# Patient Record
Sex: FEMALE | Race: BLACK OR AFRICAN AMERICAN | Employment: FULL TIME | URBAN - METROPOLITAN AREA
[De-identification: names, ages, dates, MRNs, and addresses within clinical notes are randomized per-mention and may not be internally consistent; named-entity substitution may affect disease eponyms.]

---

## 2018-05-30 ENCOUNTER — HOSPITAL ENCOUNTER (OUTPATIENT)
Dept: GENERAL RADIOLOGY | Age: 43
Discharge: HOME | End: 2018-05-30

## 2018-05-30 ENCOUNTER — HOSPITAL ENCOUNTER (OUTPATIENT)
Dept: GENERAL RADIOLOGY | Age: 43
Discharge: HOME | End: 2018-05-30
Attending: NURSE PRACTITIONER | Admitting: NURSE PRACTITIONER

## 2018-05-30 DIAGNOSIS — M25.552 PAIN IN LEFT HIP: ICD-10-CM

## 2018-05-30 DIAGNOSIS — M25.562 CHRONIC PAIN OF LEFT KNEE: ICD-10-CM

## 2018-05-30 DIAGNOSIS — G89.29 CHRONIC PAIN OF LEFT KNEE: ICD-10-CM

## 2018-05-30 DIAGNOSIS — M25.511 CHRONIC PAIN IN RIGHT SHOULDER: ICD-10-CM

## 2018-05-30 DIAGNOSIS — G89.29 CHRONIC PAIN IN RIGHT SHOULDER: ICD-10-CM

## 2018-10-03 ENCOUNTER — HOSPITAL ENCOUNTER (OUTPATIENT)
Age: 43
Discharge: HOME | End: 2018-10-03
Payer: COMMERCIAL

## 2018-10-03 LAB
ALBUMIN SERPL-MCNC: 4.4 GM/DL (ref 3.4–5)
ALBUMIN SERPL-MCNC: 4.4 GM/DL (ref 3.4–5)
ALP BLD-CCNC: 124 IU/L (ref 40–128)
ALP BLD-CCNC: 124 IU/L (ref 40–129)
ALT SERPL-CCNC: 10 U/L (ref 10–40)
ALT SERPL-CCNC: 10 U/L (ref 10–40)
ANION GAP SERPL CALCULATED.3IONS-SCNC: 13 MMOL/L (ref 4–16)
AST SERPL-CCNC: 11 IU/L (ref 15–37)
AST SERPL-CCNC: 11 IU/L (ref 15–37)
BASOPHILS ABSOLUTE: 0 K/CU MM
BASOPHILS RELATIVE PERCENT: 0.3 % (ref 0–1)
BILIRUB SERPL-MCNC: 0.3 MG/DL (ref 0–1)
BILIRUB SERPL-MCNC: 0.3 MG/DL (ref 0–1)
BILIRUBIN DIRECT: 0.2 MG/DL (ref 0–0.3)
BILIRUBIN, INDIRECT: 0.1 MG/DL (ref 0–0.7)
BUN BLDV-MCNC: 8 MG/DL (ref 6–23)
CALCIUM SERPL-MCNC: 9.6 MG/DL (ref 8.3–10.6)
CHLORIDE BLD-SCNC: 98 MMOL/L (ref 99–110)
CO2: 29 MMOL/L (ref 21–32)
CREAT SERPL-MCNC: 0.7 MG/DL (ref 0.6–1.1)
DIFFERENTIAL TYPE: ABNORMAL
EOSINOPHILS ABSOLUTE: 0.1 K/CU MM
EOSINOPHILS RELATIVE PERCENT: 1.8 % (ref 0–3)
ERYTHROCYTE SEDIMENTATION RATE: 31 MM/HR (ref 0–20)
FOLATE: 11 NG/ML (ref 3.1–17.5)
GFR AFRICAN AMERICAN: >60 ML/MIN/1.73M2
GFR NON-AFRICAN AMERICAN: >60 ML/MIN/1.73M2
GLUCOSE FASTING: 90 MG/DL (ref 70–99)
HCT VFR BLD CALC: 42.5 % (ref 37–47)
HEMOGLOBIN: 13.9 GM/DL (ref 12.5–16)
IMMATURE NEUTROPHIL %: 0.3 % (ref 0–0.43)
LYMPHOCYTES ABSOLUTE: 2.8 K/CU MM
LYMPHOCYTES RELATIVE PERCENT: 36.8 % (ref 24–44)
MCH RBC QN AUTO: 31.3 PG (ref 27–31)
MCHC RBC AUTO-ENTMCNC: 32.7 % (ref 32–36)
MCV RBC AUTO: 95.7 FL (ref 78–100)
MONOCYTES ABSOLUTE: 0.6 K/CU MM
MONOCYTES RELATIVE PERCENT: 7.6 % (ref 0–4)
NUCLEATED RBC %: 0 %
PDW BLD-RTO: 12.2 % (ref 11.7–14.9)
PLATELET # BLD: 273 K/CU MM (ref 140–440)
PMV BLD AUTO: 10.8 FL (ref 7.5–11.1)
POTASSIUM SERPL-SCNC: 3.6 MMOL/L (ref 3.5–5.1)
RBC # BLD: 4.44 M/CU MM (ref 4.2–5.4)
SEGMENTED NEUTROPHILS ABSOLUTE COUNT: 4.1 K/CU MM
SEGMENTED NEUTROPHILS RELATIVE PERCENT: 53.2 % (ref 36–66)
SODIUM BLD-SCNC: 140 MMOL/L (ref 135–145)
TOTAL CK: 119 IU/L (ref 26–140)
TOTAL IMMATURE NEUTOROPHIL: 0.02 K/CU MM
TOTAL NUCLEATED RBC: 0 K/CU MM
TOTAL PROTEIN: 6.9 GM/DL (ref 6.4–8.2)
TOTAL PROTEIN: 6.9 GM/DL (ref 6.4–8.2)
TSH HIGH SENSITIVITY: 0.7 UIU/ML (ref 0.27–4.2)
VITAMIN B-12: 645.1 PG/ML (ref 211–911)
WBC # BLD: 7.7 K/CU MM (ref 4–10.5)

## 2018-10-03 PROCEDURE — 82248 BILIRUBIN DIRECT: CPT

## 2018-10-03 PROCEDURE — 82746 ASSAY OF FOLIC ACID SERUM: CPT

## 2018-10-03 PROCEDURE — 36415 COLL VENOUS BLD VENIPUNCTURE: CPT

## 2018-10-03 PROCEDURE — 84165 PROTEIN E-PHORESIS SERUM: CPT

## 2018-10-03 PROCEDURE — 86038 ANTINUCLEAR ANTIBODIES: CPT

## 2018-10-03 PROCEDURE — 84479 ASSAY OF THYROID (T3 OR T4): CPT

## 2018-10-03 PROCEDURE — 85652 RBC SED RATE AUTOMATED: CPT

## 2018-10-03 PROCEDURE — 84436 ASSAY OF TOTAL THYROXINE: CPT

## 2018-10-03 PROCEDURE — 82607 VITAMIN B-12: CPT

## 2018-10-03 PROCEDURE — 80050 GENERAL HEALTH PANEL: CPT

## 2018-10-03 PROCEDURE — 82550 ASSAY OF CK (CPK): CPT

## 2018-10-04 LAB
ALBUMIN ELP: 3.6 GM/DL (ref 3.2–5.6)
ALPHA-1-GLOBULIN: 0.3 GM/DL (ref 0.1–0.4)
ALPHA-2-GLOBULIN: 0.9 GM/DL (ref 0.4–1.2)
BETA GLOBULIN: 1 GM/DL (ref 0.5–1.3)
GAMMA GLOBULIN: 1.1 GM/DL (ref 0.5–1.6)
TOTAL PROTEIN: 6.9 GM/DL (ref 6.4–8.2)

## 2018-10-05 LAB
T3 UPTAKE PERCENT: 33
T4 TOTAL: 6.99 UG/DL

## 2018-10-06 LAB — ANTI-NUCLEAR ANTIBODY (ANA): NORMAL

## 2018-10-08 ENCOUNTER — HOSPITAL ENCOUNTER (OUTPATIENT)
Dept: WOMENS IMAGING | Age: 43
Discharge: HOME | End: 2018-10-08
Payer: COMMERCIAL

## 2018-10-08 DIAGNOSIS — Z12.31 VISIT FOR SCREENING MAMMOGRAM: ICD-10-CM

## 2018-10-08 PROCEDURE — 77063 BREAST TOMOSYNTHESIS BI: CPT

## 2018-10-20 ENCOUNTER — APPOINTMENT (OUTPATIENT)
Dept: GENERAL RADIOLOGY | Age: 43
End: 2018-10-20
Payer: COMMERCIAL

## 2018-10-20 ENCOUNTER — APPOINTMENT (OUTPATIENT)
Dept: CT IMAGING | Age: 43
End: 2018-10-20
Payer: COMMERCIAL

## 2018-10-20 ENCOUNTER — HOSPITAL ENCOUNTER (EMERGENCY)
Age: 43
Discharge: HOME | End: 2018-10-20
Attending: EMERGENCY MEDICINE
Payer: COMMERCIAL

## 2018-10-20 VITALS
SYSTOLIC BLOOD PRESSURE: 127 MMHG | OXYGEN SATURATION: 97 % | WEIGHT: 212 LBS | HEIGHT: 68 IN | RESPIRATION RATE: 17 BRPM | BODY MASS INDEX: 32.13 KG/M2 | TEMPERATURE: 98.2 F | HEART RATE: 93 BPM | DIASTOLIC BLOOD PRESSURE: 80 MMHG

## 2018-10-20 DIAGNOSIS — R51.9 FACIAL PAIN: ICD-10-CM
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
## 2018-10-20 DIAGNOSIS — M25.561 ACUTE PAIN OF RIGHT KNEE: ICD-10-CM

## 2018-10-20 DIAGNOSIS — S40.011A CONTUSION OF RIGHT SHOULDER, INITIAL ENCOUNTER: ICD-10-CM

## 2018-10-20 PROCEDURE — 72125 CT NECK SPINE W/O DYE: CPT

## 2018-10-20 PROCEDURE — 73562 X-RAY EXAM OF KNEE 3: CPT

## 2018-10-20 PROCEDURE — 70486 CT MAXILLOFACIAL W/O DYE: CPT

## 2018-10-20 PROCEDURE — 70450 CT HEAD/BRAIN W/O DYE: CPT

## 2018-10-20 PROCEDURE — 73030 X-RAY EXAM OF SHOULDER: CPT

## 2018-10-20 PROCEDURE — 99284 EMERGENCY DEPT VISIT MOD MDM: CPT

## 2018-10-20 PROCEDURE — 72170 X-RAY EXAM OF PELVIS: CPT

## 2018-10-20 PROCEDURE — 71045 X-RAY EXAM CHEST 1 VIEW: CPT

## 2018-10-20 PROCEDURE — 6360000002 HC RX W HCPCS: Performed by: EMERGENCY MEDICINE

## 2018-10-20 RX ORDER — KETOROLAC TROMETHAMINE 30 MG/ML
30 INJECTION, SOLUTION INTRAMUSCULAR; INTRAVENOUS ONCE
Status: COMPLETED | OUTPATIENT
Start: 2018-10-20 | End: 2018-10-20

## 2018-10-20 RX ADMIN — KETOROLAC TROMETHAMINE 30 MG: 30 INJECTION INTRAMUSCULAR; INTRAVENOUS at 15:37

## 2018-10-20 ASSESSMENT — PAIN DESCRIPTION - ORIENTATION: ORIENTATION: RIGHT

## 2018-10-20 ASSESSMENT — PAIN SCALES - GENERAL: PAINLEVEL_OUTOF10: 7

## 2018-10-20 ASSESSMENT — PAIN DESCRIPTION - PAIN TYPE: TYPE: ACUTE PAIN

## 2018-10-20 NOTE — ED NOTES
Reviewed discharge instructions with patient and family. All voice understanding/deny questions. Wheelchair offered, declines. Ambulates without difficulty.        Maureen Estrella RN  10/20/18 3109

## 2018-10-20 NOTE — ED PROVIDER NOTES
Triage Chief Complaint:   Fall (Pt fell down basement stairs this morning)    [place]:  Tho Puentes is a 40 y.o. female that presents with complaint of fall. This morning was going downstairs to start laundry, tripped and fell from the second step. Struck the right side of her face, struck right s houlder, having right hip and knee pain. Pain is 7/10, worst in the right shoulder. Able to move the right shoulder but a lot of swelling and bruise. Not on blood thinners. Did not lose consciousness. Not having neck pain. No weakness or numbness in extremities. Took her home percocet and has not been improving. He has had no chest pain or difficulty breathing. No bowel pain. No nausea or vomiting. No blurry vision or changes in vision. No recent illnesses. Has been able to ambulate. ROS:  10 systems reviewed and negative except as above.      Past Medical History:   Diagnosis Date    Anemia     Anxiety     Arthritis     Bursitis     Chronic back pain     \"pain since \"\"have sciatica    Incisional hernia, without obstruction or gangrene 2016    Migraines     infrequent    Psoriasis      Past Surgical History:   Procedure Laterality Date    BACK SURGERY  13    L3,4,5, S1- have tit. cage     SECTION      per old chart [address]      per old chart had thermachoice endo ablation and D & C done2009    HYSTERECTOMY      per old chart 2010-robotic LAVH with right S & O    PELVIC LAPAROSCOPY      per old chart 2008- d&C,laproscopcy,lysis of adhesion and removal cyst left ovary/ then 2009 had diag lap with laser, lysis of adhesions, left S & O and removal cyst right ovary    SHOULDER SURGERY Right     TONSILLECTOMY      per old chart T&A done [address]  [address] [place] [address]  2016    Incisional Ventral hernia repair    WISDOM TOOTH EXTRACTION  1's     Family History   Problem Relation Age of Onset    Cancer Mother 212 lb (96.2 kg)      Height 5' 8\" (1.727 m)      Head Circumference       Peak Flow       Pain Score       Pain Loc       Pain Edu? Excl. in [address]? My pulse ox interpretation is - normal    General appearance:  No acute distress. Skin:  Warm. Dry. Eye:  Extraocular movements intact. Ears, nose, mouth and throat:  Oral mucosa moist   Neck:  Trachea midline. Extremity:  No swelling. Mild tenderness to palpation over right hip and right knee without swelling, normal ROM, no contusions. On right lateral deltoid there is an abrasion and contusion with mild swelling. Normal ROM over the right shoulder. No tendernress to palpation over the right mid humerus, elbow and wrist. No tenderness over the right ankle and foot. Heart:  Regular rate and rhythm, normal S1 & S2, no extra heart sounds. Perfusion:  intact  Respiratory:  Lungs clear to auscultation bilaterally. Respirations nonlabored. Abdominal:  Normal bowel sounds. Soft. Nontender. Non distended. Neurological:  Alert and oriented times 3. CN 2-12 grossly intact, strength 5/5 in bilateral upper and lower extremities with sensation intact. Normal gait. Psychiatric:  Appropriate    I have reviewed and interpreted all of the currently available lab results from this visit (if applicable):  No results found for this visit on 10/20/18. Radiographs (if obtained):  [] The following radiograph was interpreted by myself in the absence of a radiologist:   [x] Radiologist's Report Reviewed:  XR KNEE RIGHT (3 VIEWS)   Final Result   Unremarkable right knee. CT FACIAL BONES WO CONTRAST   Final Result   1. No acute intracranial process. 2.  No fracture or dislocation of cervical spine. 3.  No evidence of facial bone fracture. CT Cervical Spine WO Contrast   Final Result   1. No acute intracranial process. 2.  No fracture or dislocation of cervical spine. 3.  No evidence of facial bone fracture.

## 2019-01-02 ENCOUNTER — OFFICE VISIT (OUTPATIENT)
Dept: FAMILY MEDICINE CLINIC | Age: 44
End: 2019-01-02
Payer: COMMERCIAL

## 2019-01-02 VITALS
WEIGHT: 221.4 LBS | HEIGHT: 68 IN | DIASTOLIC BLOOD PRESSURE: 74 MMHG | SYSTOLIC BLOOD PRESSURE: 114 MMHG | OXYGEN SATURATION: 98 % | HEART RATE: 92 BPM | BODY MASS INDEX: 33.56 KG/M2

## 2019-01-02 DIAGNOSIS — M25.512 ACUTE PAIN OF LEFT SHOULDER: Primary | ICD-10-CM

## 2019-01-02 PROCEDURE — G8427 DOCREV CUR MEDS BY ELIG CLIN: HCPCS | Performed by: NURSE PRACTITIONER

## 2019-01-02 PROCEDURE — 99202 OFFICE O/P NEW SF 15 MIN: CPT | Performed by: NURSE PRACTITIONER

## 2019-01-02 PROCEDURE — G8417 CALC BMI ABV UP PARAM F/U: HCPCS | Performed by: NURSE PRACTITIONER

## 2019-01-02 PROCEDURE — G8484 FLU IMMUNIZE NO ADMIN: HCPCS | Performed by: NURSE PRACTITIONER

## 2019-01-02 PROCEDURE — 4004F PT TOBACCO SCREEN RCVD TLK: CPT | Performed by: NURSE PRACTITIONER

## 2019-01-02 ASSESSMENT — PATIENT HEALTH QUESTIONNAIRE - PHQ9
2. FEELING DOWN, DEPRESSED OR HOPELESS: 0
1. LITTLE INTEREST OR PLEASURE IN DOING THINGS: 0
SUM OF ALL RESPONSES TO PHQ9 QUESTIONS 1 & 2: 0
SUM OF ALL RESPONSES TO PHQ QUESTIONS 1-9: 0
SUM OF ALL RESPONSES TO PHQ QUESTIONS 1-9: 0

## 2019-01-02 ASSESSMENT — ENCOUNTER SYMPTOMS
COUGH: 0
SHORTNESS OF BREATH: 0
CHEST TIGHTNESS: 0
WHEEZING: 0

## 2019-01-04 ENCOUNTER — HOSPITAL ENCOUNTER (OUTPATIENT)
Dept: GENERAL RADIOLOGY | Age: 44
Discharge: HOME | End: 2019-01-04
Payer: COMMERCIAL

## 2019-01-04 ENCOUNTER — HOSPITAL ENCOUNTER (OUTPATIENT)
Age: 44
Discharge: HOME | End: 2019-01-04
Payer: COMMERCIAL

## 2019-01-04 DIAGNOSIS — M25.512 ACUTE PAIN OF LEFT SHOULDER: ICD-10-CM

## 2019-01-04 PROCEDURE — 73030 X-RAY EXAM OF SHOULDER: CPT

## 2019-01-04 PROCEDURE — 73010 X-RAY EXAM OF SHOULDER BLADE: CPT

## 2019-02-16 ENCOUNTER — APPOINTMENT (OUTPATIENT)
Dept: GENERAL RADIOLOGY | Age: 44
End: 2019-02-16
Payer: COMMERCIAL

## 2019-02-16 ENCOUNTER — HOSPITAL ENCOUNTER (EMERGENCY)
Age: 44
Discharge: HOME | End: 2019-02-16
Attending: EMERGENCY MEDICINE
Payer: COMMERCIAL

## 2019-02-16 VITALS
TEMPERATURE: 98.1 F | WEIGHT: 221 LBS | HEIGHT: 68 IN | SYSTOLIC BLOOD PRESSURE: 123 MMHG | OXYGEN SATURATION: 96 % | DIASTOLIC BLOOD PRESSURE: 82 MMHG | HEART RATE: 84 BPM | BODY MASS INDEX: 33.49 KG/M2 | RESPIRATION RATE: 16 BRPM

## 2019-02-16 DIAGNOSIS — M79.605 LEFT LEG PAIN: ICD-10-CM

## 2019-02-16 DIAGNOSIS — M54.32 SCIATICA OF LEFT SIDE: ICD-10-CM
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
## 2019-02-16 PROCEDURE — 6370000000 HC RX 637 (ALT 250 FOR IP): Performed by: EMERGENCY MEDICINE

## 2019-02-16 PROCEDURE — 96372 THER/PROPH/DIAG INJ SC/IM: CPT

## 2019-02-16 PROCEDURE — 6360000002 HC RX W HCPCS: Performed by: EMERGENCY MEDICINE

## 2019-02-16 PROCEDURE — 73502 X-RAY EXAM HIP UNI 2-3 VIEWS: CPT

## 2019-02-16 PROCEDURE — 99283 EMERGENCY DEPT VISIT LOW MDM: CPT

## 2019-02-16 RX ORDER — PREGABALIN 75 MG/1
150 CAPSULE ORAL ONCE
Status: COMPLETED | OUTPATIENT
Start: 2019-02-16 | End: 2019-02-16

## 2019-02-16 RX ORDER — OXYCODONE HYDROCHLORIDE 5 MG/1
10 TABLET ORAL ONCE
Status: COMPLETED | OUTPATIENT
Start: 2019-02-16 | End: 2019-02-16

## 2019-02-16 RX ORDER — KETOROLAC TROMETHAMINE 30 MG/ML
30 INJECTION, SOLUTION INTRAMUSCULAR; INTRAVENOUS ONCE
Status: COMPLETED | OUTPATIENT
Start: 2019-02-16 | End: 2019-02-16

## 2019-02-16 RX ADMIN — KETOROLAC TROMETHAMINE 30 MG: 30 INJECTION, SOLUTION INTRAMUSCULAR at 03:20

## 2019-02-16 RX ADMIN — PREGABALIN 150 MG: 75 CAPSULE ORAL at 03:30

## 2019-02-16 RX ADMIN — OXYCODONE HYDROCHLORIDE 10 MG: 5 TABLET ORAL at 03:20

## 2019-02-16 ASSESSMENT — PAIN SCALES - GENERAL
PAINLEVEL_OUTOF10: 5
PAINLEVEL_OUTOF10: 5

## 2019-04-15 ENCOUNTER — APPOINTMENT (OUTPATIENT)
Dept: GENERAL RADIOLOGY | Age: 44
End: 2019-04-15
Payer: COMMERCIAL

## 2019-04-15 ENCOUNTER — HOSPITAL ENCOUNTER (EMERGENCY)
Age: 44
Discharge: HOME | End: 2019-04-15
Payer: COMMERCIAL

## 2019-04-15 VITALS
OXYGEN SATURATION: 100 % | RESPIRATION RATE: 18 BRPM | DIASTOLIC BLOOD PRESSURE: 92 MMHG | TEMPERATURE: 97.6 F | HEART RATE: 86 BPM | HEIGHT: 68 IN | BODY MASS INDEX: 33.49 KG/M2 | SYSTOLIC BLOOD PRESSURE: 141 MMHG | WEIGHT: 221 LBS

## 2019-04-15 DIAGNOSIS — S93.402A SPRAIN OF LEFT ANKLE, UNSPECIFIED LIGAMENT, INITIAL ENCOUNTER: Primary | ICD-10-CM

## 2019-04-15 PROCEDURE — 99283 EMERGENCY DEPT VISIT LOW MDM: CPT

## 2019-04-15 PROCEDURE — 73600 X-RAY EXAM OF ANKLE: CPT

## 2019-04-15 PROCEDURE — 73620 X-RAY EXAM OF FOOT: CPT

## 2019-04-15 RX ORDER — IBUPROFEN 800 MG/1
800 TABLET ORAL EVERY 6 HOURS PRN
Qty: 30 TABLET | Refills: 0 | Status: SHIPPED | OUTPATIENT
Start: 2019-04-15

## 2019-04-15 ASSESSMENT — PAIN DESCRIPTION - PAIN TYPE: TYPE: ACUTE PAIN

## 2019-04-15 ASSESSMENT — PAIN DESCRIPTION - LOCATION: LOCATION: ANKLE;FOOT

## 2019-04-15 ASSESSMENT — PAIN DESCRIPTION - ORIENTATION: ORIENTATION: LEFT

## 2019-04-15 ASSESSMENT — PAIN SCALES - GENERAL: PAINLEVEL_OUTOF10: 4

## 2019-04-15 NOTE — LETTER
[facility] Emergency Department  [id number]  Phone: [phone]  Fax: [phone]             April 15, 2019    Patient: Daysi Childs   YOB: 1975   Date of Visit: 4/15/2019       To Whom It May Concern:    Sharyle Lutes was seen and treated in our emergency department on 4/15/2019. She may return to work on 17 April 2019.       Sincerely,             Signature:__________________________________

## 2019-04-15 NOTE — ED PROVIDER NOTES
eMERGENCY dEPARTMENT eNCOUnter        [address] [facility]    Chief Complaint   Patient presents with    Foot Injury     left    Ankle Injury     left       HPI    Carmela Estrada is a 44 y.o. female who presents with left ankle pain. Onset was 2 nights ago. Context:  Patient slipped and fell getting out of the shower, twisting the left ankle. Pain is localized to the entire ankle radiating into the foot. Constant since onset. Characterized as throbbing. Aggravated by weightbearing, alleviated by nothing. Severity of the pain is a(n) 4 on a scale of 0-10. Patient reports associated swelling. Denies any other injury from fall. REVIEW OF SYSTEMS    See HPI for further details. Review of systems otherwise negative. Musculoskeletal:  + left ankle pain  Integument:  Denies skin changes. Neurologic:  Denies numbness, tingling.      PAST MEDICAL HISTORY    Past Medical History:   Diagnosis Date    Anemia     Anxiety     Arthritis     Bursitis     Chronic back pain     \"pain since \"\"have sciatica    Incisional hernia, without obstruction or gangrene 2016    Migraines     infrequent    Psoriasis        SURGICAL HISTORY    Past Surgical History:   Procedure Laterality Date    BACK SURGERY  13    L3,4,5, S1- have tit. cage     SECTION      per old chart [address]      per old chart had thermachoice endo ablation and D & C done2009    HYSTERECTOMY      per old chart 2010-robotic LAVH with right S & O    PELVIC LAPAROSCOPY      per old chart 2008- d&C,laproscopcy,lysis of adhesion and removal cyst left ovary/ then 2009 had diag lap with laser, lysis of adhesions, left S & O and removal cyst right ovary    SHOULDER SURGERY Right     TONSILLECTOMY      per old chart T&A done [address]      VENTRAL HERNIA REPAIR  2016    Incisional Ventral hernia repair    WISDOM TOOTH EXTRACTION         CURRENT MEDICATIONS    Current Outpatient Rx   Medication Sig Dispense Refill    ibuprofen (ADVIL;MOTRIN) 800 MG tablet Take 1 tablet by mouth every 6 hours as needed for Pain 30 tablet 0    hydrOXYzine (VISTARIL) 25 MG capsule Take by mouth 3 times daily as needed for Itching      oxybutynin (DITROPAN) 5 MG tablet Take by mouth 3 times daily      atorvastatin (LIPITOR) 20 MG tablet Take 20 mg by mouth daily      DULoxetine (CYMBALTA) 30 MG capsule Take 90 mg by mouth daily       pregabalin (LYRICA) 100 MG capsule Take 100 mg by mouth 2 times daily.  Marci Downy baclofen (LIORESAL) 10 MG tablet Take 10 mg by mouth 2 times daily      oxyCODONE-acetaminophen (PERCOCET) 7.5-325 MG per tablet Take 1 tablet by mouth every 4 hours as needed for Pain       estropipate (OGEN) 3 MG tablet Take 0.625 mg by mouth daily          ALLERGIES    Allergies   Allergen Reactions    Latex Other (See Comments)     Blister      Morphine Hives    Adhesive Tape Other (See Comments)     Blister    Codeine        FAMILY HISTORY    Family History   Problem Relation Age of Onset    Cancer Mother         pancreatic and ovarian ca    High Blood Pressure Mother     Diabetes Mother     Asthma Mother     Heart Disease Brother         born with hole in heart , heart murmur       SOCIAL HISTORY    Social History     Socioeconomic History    Marital status:      Spouse name: None    Number of children: None    Years of education: None    Highest education level: None   Occupational History    None   Social Needs    Financial resource strain: None    Food insecurity:     Worry: None     Inability: None    Transportation needs:     Medical: None     Non-medical: None   Tobacco Use    Smoking status: Current Every Day Smoker     Packs/day: 0.75     Years: 31.00     Pack years: 23.25     Types: Cigarettes     Start date: 1987    Smokeless tobacco: Never Used   Substance and Sexual Activity    Alcohol use: No    Drug use: No    Sexual activity: None Lifestyle    Physical activity:     Days per week: None     Minutes per session: None    Stress: None   Relationships    Social connections:     Talks on phone: None     Gets together: None     Attends Zoroastrian service: None     Active member of club or organization: None     Attends meetings of clubs or organizations: None     Relationship status: None    Intimate partner violence:     Fear of current or ex partner: None     Emotionally abused: None     Physically abused: None     Forced sexual activity: None   Other Topics Concern    None   Social History Narrative    None       PHYSICAL EXAM    VITAL SIGNS: BP (!) 141/92   Pulse 86   Temp 97.6 °F (36.4 °C) (Oral)   Resp 18   Ht 5' 8\" (1.727 m)   Wt 221 lb (100.2 kg)   SpO2 100%   BMI 33.60 kg/m²   Constitutional:  Well developed, well nourished, no acute distress, non-toxic appearance   HENT:  NC/AT. Ears, nose, mouth normal.  Respiratory:  Normal respiratory effort. Musculoskeletal:  Diffuse soft tissue swelling to the left ankle and foot with diffuse tenderness. No gross deformity. No tibial plateau tenderness. DP intact and symmetric. Sensation intact. Increased pain with ROM, particularly dorsiflexion but mechanisms all intact. Integument:  Warm and dry. No bruising. Neurologic:  Alert and oriented. RADIOLOGY/PROCEDURES    Xr Ankle Left (2 Views)    Result Date: 4/15/2019  EXAMINATION: 3 XRAY VIEWS OF THE LEFT ANKLE; 3 XRAY VIEWS OF THE LEFT FOOT 4/15/2019 3:12 am COMPARISON: None. HISTORY: ORDERING SYSTEM PROVIDED HISTORY: injury TECHNOLOGIST PROVIDED HISTORY: Reason for exam:->injury Ordering Physician Provided Reason for Exam: injury, pt heard \"pop\" when stepping out of bath tub Acuity: Acute Type of Exam: Initial FINDINGS: Left ankle: Three views provided. Mild diffuse lower leg and ankle soft tissue edema. Normal bone mineral density. Normal ankle alignment and joint space. No acute fracture. Left foot:  Three views provided. Mild edema of the ankle. Normal bone mineral density. No acute fracture. 1. Normal left ankle alignment with no acute fracture. 2. Normal left foot alignment with no acute fracture. Xr Foot Left (2 Views)    Result Date: 4/15/2019  EXAMINATION: 3 XRAY VIEWS OF THE LEFT ANKLE; 3 XRAY VIEWS OF THE LEFT FOOT 4/15/2019 3:12 am COMPARISON: None. HISTORY: ORDERING SYSTEM PROVIDED HISTORY: injury TECHNOLOGIST PROVIDED HISTORY: Reason for exam:->injury Ordering Physician Provided Reason for Exam: injury, pt heard \"pop\" when stepping out of bath tub Acuity: Acute Type of Exam: Initial FINDINGS: Left ankle: Three views provided. Mild diffuse lower leg and ankle soft tissue edema. Normal bone mineral density. Normal ankle alignment and joint space. No acute fracture. Left foot: Three views provided. Mild edema of the ankle. Normal bone mineral density. No acute fracture. 1. Normal left ankle alignment with no acute fracture. 2. Normal left foot alignment with no acute fracture. ED COURSE & MEDICAL DECISION MAKING    -  Patient seen and evaluated in the emergency department. -  Triage and nursing notes reviewed and incorporated. -  Old chart records reviewed and incorporated. -  Supervising physician was Dr. Alcon Woody.  Patient was seen independently. -  Differential diagnosis includes:  fracture, sprain, dislocation, and others  -  Work-up included:  XR  -  Results discussed with patient. XR is negative. ACE wrap applied, crutches supplied. RICE. Rx Ibuprofen. FU with PCP or return here as needed for new/worsening symptoms. She is agreeable with plan of care and disposition.  -  Patient discharged home. FINAL IMPRESSION    1.  Sprain of left ankle, unspecified ligament, initial encounter                Sailaja Gaspar PA-C  04/15/19 7004

## 2019-04-24 ENCOUNTER — OFFICE VISIT (OUTPATIENT)
Dept: ORTHOPEDIC SURGERY | Age: 44
End: 2019-04-24
Payer: COMMERCIAL

## 2019-04-24 VITALS — WEIGHT: 224 LBS | HEIGHT: 68 IN | RESPIRATION RATE: 16 BRPM | BODY MASS INDEX: 33.95 KG/M2

## 2019-04-24 DIAGNOSIS — M21.372 FOOT DROP, LEFT FOOT: Primary | ICD-10-CM

## 2019-04-24 DIAGNOSIS — S93.402A SPRAIN OF LEFT ANKLE, UNSPECIFIED LIGAMENT, INITIAL ENCOUNTER: ICD-10-CM

## 2019-04-24 DIAGNOSIS — M25.572 ACUTE LEFT ANKLE PAIN: Primary | ICD-10-CM

## 2019-04-24 PROBLEM — I83.10 VARICOSE VEINS OF LOWER EXTREMITY WITH INFLAMMATION: Status: ACTIVE | Noted: 2017-01-27

## 2019-04-24 PROBLEM — M71.9 BURSITIS: Status: ACTIVE | Noted: 2019-04-24

## 2019-04-24 PROBLEM — G60.9 IDIOPATHIC PERIPHERAL NEUROPATHY: Status: ACTIVE | Noted: 2019-04-24

## 2019-04-24 PROBLEM — M47.816 LUMBAR SPONDYLOSIS: Status: ACTIVE | Noted: 2019-04-24

## 2019-04-24 PROBLEM — M96.1 LUMBAR POST-LAMINECTOMY SYNDROME: Status: ACTIVE | Noted: 2019-04-24

## 2019-04-24 PROBLEM — M70.60 TROCHANTERIC BURSITIS: Status: ACTIVE | Noted: 2019-04-24

## 2019-04-24 PROCEDURE — 99202 OFFICE O/P NEW SF 15 MIN: CPT | Performed by: ORTHOPAEDIC SURGERY

## 2019-04-24 PROCEDURE — 73610 X-RAY EXAM OF ANKLE: CPT | Performed by: ORTHOPAEDIC SURGERY

## 2019-04-24 RX ORDER — OMEPRAZOLE 20 MG/1
CAPSULE, DELAYED RELEASE ORAL
Refills: 1 | Status: ON HOLD | COMMUNITY
Start: 2019-03-12 | End: 2022-04-01

## 2019-04-24 RX ORDER — CETIRIZINE HYDROCHLORIDE 10 MG/1
TABLET ORAL
Refills: 0 | COMMUNITY
Start: 2019-03-12 | End: 2019-04-24 | Stop reason: ALTCHOICE

## 2019-04-24 RX ORDER — VARENICLINE TARTRATE 1 MG/1
TABLET, FILM COATED ORAL
Status: ON HOLD | COMMUNITY
End: 2022-04-01

## 2019-04-24 RX ORDER — VARENICLINE TARTRATE 1 MG/1
TABLET, FILM COATED ORAL
Refills: 0 | Status: ON HOLD | COMMUNITY
Start: 2019-03-13 | End: 2022-04-01

## 2019-04-24 RX ORDER — OXYCODONE AND ACETAMINOPHEN 10; 325 MG/1; MG/1
TABLET ORAL
Refills: 0 | COMMUNITY
Start: 2019-04-18

## 2019-04-24 RX ORDER — DULOXETIN HYDROCHLORIDE 60 MG/1
CAPSULE, DELAYED RELEASE ORAL
Refills: 2 | COMMUNITY
Start: 2019-03-28

## 2019-04-24 NOTE — PROGRESS NOTES
Radiology Report    Name: Mayela Hernández  YOB: 1975  Procedure: ankle left  Date: 4/24/2019  Comparison:  4/15/19  Reason for X-ray:   Patient Active Problem List   Diagnosis    Incisional hernia, without obstruction or gangrene    Bursitis    Degeneration of lumbosacral intervertebral disc    Idiopathic peripheral neuropathy    Lumbar post-laminectomy syndrome    Lumbar spondylosis    Trochanteric bursitis    Varicose veins of lower extremity with inflammation      Reading: negative for fx  There is lateral soft tissue swelling      Electronically signed by: Iona Mukherjee MD, 4/24/2019 4:29 PM

## 2019-04-24 NOTE — PATIENT INSTRUCTIONS
Fitted for short boot   Wear boot while ambulating   Fall precautions  Physical therapy ordered    Follow up with neurologist(Dr Mesa) and back specialist (Dr Mulu Agueros discussed   Follow up in 4 weeks with x-rays

## 2019-04-24 NOTE — PROGRESS NOTES
ORTHOPEDIC NEW PATIENT NOTE      2019    Patient name: Francy Subramanian  : 1975    CHIEF COMPLAINT  Chief Complaint   Patient presents with    Ankle Pain     left DOI 19       HPI  The patient was seen and examined. Francy Subramanian is a 44 y.o. female who presents with left ankle pain that started on 4/15/19 when she slipped on a rug when she was getting off the toilet and her foot went behind her and twisted her ankle. She was evaluated in the ED and x-rays negative for acute fracture. She was referred to orthopedics for follow up care. She noticed a foot drop/ cannot dorsiflex her ankle since the fall. She had surg on her spine in the past by Dr Randee Lennox and has a neurologist as well and just had EMGS recently for Carpal tunnel. PAST MEDICAL HISTORY  Past Medical History:   Diagnosis Date    Anemia     Anxiety     Arthritis     Bursitis     Chronic back pain     \"pain since \"\"have sciatica    Incisional hernia, without obstruction or gangrene 2016    Migraines     infrequent    Psoriasis        CURRENT MEDICATIONS  Prior to Admission medications    Medication Sig Start Date End Date Taking? Authorizing Provider   omeprazole (PRILOSEC) 20 MG delayed release capsule TAKE 1 CAPSULE BY MOUTH EVERY MORNING ON AN EMPTY STOMACH 3/12/19  Yes Historical Provider, MD   oxyCODONE-acetaminophen (PERCOCET)  MG per tablet TAKE 1 TABLET BY MOUTH EVERY 6 HOURS AS NEEDED  30 DAYS 19  Yes Historical Provider, MD   LYRICA 150 MG capsule 150 mg 2 times daily.   3/28/19  Yes Historical Provider, MD   DULoxetine (CYMBALTA) 60 MG extended release capsule TAKE ONE CAPSULE BY MOUTH DAILY WITH 30MG 3/28/19  Yes Historical Provider, MD   CHANTIX CONTINUING MONTH INGRID 1 MG tablet TAKE 1 TABLET BY MOUTH TWICE A DAY AFTER MEALS WITH A GLASS OF WATER 3/13/19  Yes Historical Provider, MD   varenicline (CHANTIX) 1 MG tablet Chantix Continuing Month Box 1 mg tablet   Yes Historical Provider, MD   ibuprofen (ADVIL;MOTRIN) 800 MG tablet Take 1 tablet by mouth every 6 hours as needed for Pain 4/15/19  Yes Natty Bhakta PA-C   oxybutynin (DITROPAN) 5 MG tablet Take by mouth 3 times daily   Yes Historical Provider, MD   atorvastatin (LIPITOR) 20 MG tablet Take 20 mg by mouth daily   Yes Historical Provider, MD   DULoxetine (CYMBALTA) 30 MG capsule Take 90 mg by mouth daily    Yes Historical Provider, MD   baclofen (LIORESAL) 10 MG tablet Take 10 mg by mouth 2 times daily   Yes Historical Provider, MD   estropipate (OGEN) 3 MG tablet Take 0.625 mg by mouth daily    Yes Historical Provider, MD       ALLERGIES  Allergies   Allergen Reactions    Latex Other (See Comments)     Blister    blisters    Morphine Hives    Adhesive Tape Other (See Comments)     Blister    Aspirin Hives    Codeine Hives       SURGICAL HISTORY  Past Surgical History:   Procedure Laterality Date    BACK SURGERY  13    L3,4,5, S1- have tit. cage     SECTION      per old chart [address]      per old chart had thermachoice endo ablation and D & C done2009    HYSTERECTOMY      per old chart 2010-robotic LAVH with right S & O    PELVIC LAPAROSCOPY      per old chart 2008- d&C,laproscopcy,lysis of adhesion and removal cyst left ovary/ then 2009 had diag lap with laser, lysis of adhesions, left S & O and removal cyst right ovary    SHOULDER SURGERY Right     TONSILLECTOMY      per old chart T&A done [address]      VENTRAL HERNIA REPAIR  2016    Incisional Ventral hernia repair    WISDOM TOOTH EXTRACTION         FAMILY HISTORY  Family History   Problem Relation Age of Onset    Cancer Mother         pancreatic and ovarian ca    High Blood Pressure Mother     Diabetes Mother     Asthma Mother     Heart Disease Brother         born with hole in heart , heart murmur       SOCIAL HISTORY  Social History     Socioeconomic History    Marital status:  ALBUMIN, MG, INR, PTT, CKMB, TROPONINI, AST, ALT, LIPASE, LACTATE, TSH in the last 72 hours. Invalid input(s): GLU, PT, CK1, TBILI, URINE      IMAGING  Narrative   EXAMINATION:   3 XRAY VIEWS OF THE LEFT ANKLE; 3 XRAY VIEWS OF THE LEFT FOOT       4/15/2019 3:12 am       COMPARISON:   None.       HISTORY:   ORDERING SYSTEM PROVIDED HISTORY: injury   TECHNOLOGIST PROVIDED HISTORY:   Reason for exam:->injury   Ordering Physician Provided Reason for Exam: injury, pt heard \"pop\" when   stepping out of bath tub   Acuity: Acute   Type of Exam: Initial       FINDINGS:   Left ankle: Three views provided.  Mild diffuse lower leg and ankle soft   tissue edema.  Normal bone mineral density.  Normal ankle alignment and joint   space.  No acute fracture.       Left foot: Three views provided.  Mild edema of the ankle.  Normal bone   mineral density.  No acute fracture.           Impression   1. Normal left ankle alignment with no acute fracture. 2. Normal left foot alignment with no acute fracture.             xrays today show no fracture of the left ankle  ASSESSMENT     Patient Active Problem List   Diagnosis    Incisional hernia, without obstruction or gangrene    Bursitis    Degeneration of lumbosacral intervertebral disc    Idiopathic peripheral neuropathy    Lumbar post-laminectomy syndrome    Lumbar spondylosis    Trochanteric bursitis    Varicose veins of lower extremity with inflammation        44 y.o. female with left ankle sprain and foot drop  PLAN   1. She is going to call and follow up with Dr Clementina Serrano and Her neurologist for EMG and work up for foot drop  2. PT for her ankle sprain  3. WBAT, cam boot  4.   Follow up in 4 weeks     Electronically signed by: Jackie Valadez MD, 4/24/2019 4:22 PM

## 2019-06-13 PROBLEM — M21.372 FOOT DROP, LEFT: Status: ACTIVE | Noted: 2019-06-13

## 2019-06-13 PROBLEM — S93.402A LEFT ANKLE SPRAIN: Status: ACTIVE | Noted: 2019-06-13

## 2019-07-27 ENCOUNTER — HOSPITAL ENCOUNTER (OUTPATIENT)
Age: 44
Discharge: HOME | End: 2019-07-27
Payer: COMMERCIAL

## 2019-07-27 ENCOUNTER — HOSPITAL ENCOUNTER (OUTPATIENT)
Dept: MRI IMAGING | Age: 44
Discharge: HOME | End: 2019-07-27
Payer: COMMERCIAL

## 2019-07-27 DIAGNOSIS — G90.522 COMPLEX REGIONAL PAIN SYNDROME I OF LEFT LOWER LIMB: ICD-10-CM

## 2019-07-27 LAB
ALBUMIN SERPL-MCNC: 4.2 GM/DL (ref 3.4–5)
ALBUMIN SERPL-MCNC: 4.2 GM/DL (ref 3.4–5)
ALP BLD-CCNC: 121 IU/L (ref 40–129)
ALP BLD-CCNC: 121 IU/L (ref 40–129)
ALT SERPL-CCNC: 11 U/L (ref 10–40)
ALT SERPL-CCNC: 11 U/L (ref 10–40)
ANION GAP SERPL CALCULATED.3IONS-SCNC: 10 MMOL/L (ref 4–16)
AST SERPL-CCNC: 13 IU/L (ref 15–37)
AST SERPL-CCNC: 13 IU/L (ref 15–37)
BASOPHILS ABSOLUTE: 0 K/CU MM
BASOPHILS RELATIVE PERCENT: 0.3 % (ref 0–1)
BILIRUB SERPL-MCNC: 0.2 MG/DL (ref 0–1)
BILIRUB SERPL-MCNC: 0.2 MG/DL (ref 0–1)
BILIRUBIN DIRECT: 0.2 MG/DL (ref 0–0.3)
BILIRUBIN, INDIRECT: 0 MG/DL (ref 0–0.7)
BUN BLDV-MCNC: 8 MG/DL (ref 6–23)
CALCIUM SERPL-MCNC: 9.6 MG/DL (ref 8.3–10.6)
CHLORIDE BLD-SCNC: 101 MMOL/L (ref 99–110)
CO2: 30 MMOL/L (ref 21–32)
CREAT SERPL-MCNC: 1.1 MG/DL (ref 0.6–1.1)
DIFFERENTIAL TYPE: ABNORMAL
EOSINOPHILS ABSOLUTE: 0.2 K/CU MM
EOSINOPHILS RELATIVE PERCENT: 2.5 % (ref 0–3)
ERYTHROCYTE SEDIMENTATION RATE: 38 MM/HR (ref 0–20)
FOLATE: 5.3 NG/ML (ref 3.1–17.5)
GFR AFRICAN AMERICAN: >60 ML/MIN/1.73M2
GFR NON-AFRICAN AMERICAN: 54 ML/MIN/1.73M2
GLUCOSE BLD-MCNC: 88 MG/DL (ref 70–99)
HCT VFR BLD CALC: 37.1 % (ref 37–47)
HEMOGLOBIN: 11.8 GM/DL (ref 12.5–16)
IMMATURE NEUTROPHIL %: 0.3 % (ref 0–0.43)
LYMPHOCYTES ABSOLUTE: 3 K/CU MM
LYMPHOCYTES RELATIVE PERCENT: 46.5 % (ref 24–44)
MCH RBC QN AUTO: 30.9 PG (ref 27–31)
MCHC RBC AUTO-ENTMCNC: 31.8 % (ref 32–36)
MCV RBC AUTO: 97.1 FL (ref 78–100)
MONOCYTES ABSOLUTE: 0.5 K/CU MM
MONOCYTES RELATIVE PERCENT: 7 % (ref 0–4)
NUCLEATED RBC %: 0 %
PDW BLD-RTO: 12.5 % (ref 11.7–14.9)
PLATELET # BLD: 253 K/CU MM (ref 140–440)
PMV BLD AUTO: 10.7 FL (ref 7.5–11.1)
POTASSIUM SERPL-SCNC: 3.8 MMOL/L (ref 3.5–5.1)
RBC # BLD: 3.82 M/CU MM (ref 4.2–5.4)
SEGMENTED NEUTROPHILS ABSOLUTE COUNT: 2.8 K/CU MM
SEGMENTED NEUTROPHILS RELATIVE PERCENT: 43.4 % (ref 36–66)
SODIUM BLD-SCNC: 141 MMOL/L (ref 135–145)
TOTAL CK: 307 IU/L (ref 26–140)
TOTAL IMMATURE NEUTOROPHIL: 0.02 K/CU MM
TOTAL NUCLEATED RBC: 0 K/CU MM
TOTAL PROTEIN: 6.6 GM/DL (ref 6.4–8.2)
TOTAL PROTEIN: 6.6 GM/DL (ref 6.4–8.2)
TSH HIGH SENSITIVITY: 1.81 UIU/ML (ref 0.27–4.2)
VITAMIN B-12: 398.7 PG/ML (ref 211–911)
WBC # BLD: 6.5 K/CU MM (ref 4–10.5)

## 2019-07-27 PROCEDURE — 73718 MRI LOWER EXTREMITY W/O DYE: CPT

## 2019-07-27 PROCEDURE — 84443 ASSAY THYROID STIM HORMONE: CPT

## 2019-07-27 PROCEDURE — 80053 COMPREHEN METABOLIC PANEL: CPT

## 2019-07-27 PROCEDURE — 85652 RBC SED RATE AUTOMATED: CPT

## 2019-07-27 PROCEDURE — 82550 ASSAY OF CK (CPK): CPT

## 2019-07-27 PROCEDURE — 84436 ASSAY OF TOTAL THYROXINE: CPT

## 2019-07-27 PROCEDURE — 84165 PROTEIN E-PHORESIS SERUM: CPT

## 2019-07-27 PROCEDURE — 82607 VITAMIN B-12: CPT

## 2019-07-27 PROCEDURE — 85025 COMPLETE CBC W/AUTO DIFF WBC: CPT

## 2019-07-27 PROCEDURE — 86038 ANTINUCLEAR ANTIBODIES: CPT

## 2019-07-27 PROCEDURE — 84479 ASSAY OF THYROID (T3 OR T4): CPT

## 2019-07-27 PROCEDURE — 36415 COLL VENOUS BLD VENIPUNCTURE: CPT

## 2019-07-27 PROCEDURE — 82248 BILIRUBIN DIRECT: CPT

## 2019-07-27 PROCEDURE — 82746 ASSAY OF FOLIC ACID SERUM: CPT

## 2019-07-30 LAB
ALBUMIN ELP: 3.5 GM/DL (ref 3.2–5.6)
ALPHA-1-GLOBULIN: 0.3 GM/DL (ref 0.1–0.4)
ALPHA-2-GLOBULIN: 0.8 GM/DL (ref 0.4–1.2)
ANTI-NUCLEAR ANTIBODY (ANA): NORMAL
ANTI-NUCLEAR ANTIBODY (ANA): NORMAL
BETA GLOBULIN: 1 GM/DL (ref 0.5–1.3)
GAMMA GLOBULIN: 1 GM/DL (ref 0.5–1.6)
SPEP INTERPRETATION: NORMAL
T3 UPTAKE PERCENT: 30 % (ref 28–41)
T3 UPTAKE PERCENT: NORMAL % (ref 28–41)
T4 TOTAL: 6.34 UG/DL (ref 5.1–14.1)
T4 TOTAL: NORMAL UG/DL (ref 5.1–14.1)
TOTAL PROTEIN: 6.6 GM/DL (ref 6.4–8.2)

## 2020-07-27 ENCOUNTER — APPOINTMENT (OUTPATIENT)
Dept: ULTRASOUND IMAGING | Age: 45
End: 2020-07-27
Payer: COMMERCIAL

## 2020-07-27 ENCOUNTER — HOSPITAL ENCOUNTER (EMERGENCY)
Age: 45
Discharge: HOME | End: 2020-07-28
Attending: EMERGENCY MEDICINE
Payer: COMMERCIAL

## 2020-07-27 VITALS
TEMPERATURE: 97.3 F | RESPIRATION RATE: 18 BRPM | DIASTOLIC BLOOD PRESSURE: 71 MMHG | SYSTOLIC BLOOD PRESSURE: 105 MMHG | HEART RATE: 71 BPM | OXYGEN SATURATION: 100 %

## 2020-07-27 PROCEDURE — 76705 ECHO EXAM OF ABDOMEN: CPT

## 2020-07-27 PROCEDURE — 99284 EMERGENCY DEPT VISIT MOD MDM: CPT

## 2020-07-27 ASSESSMENT — PAIN DESCRIPTION - ORIENTATION: ORIENTATION: RIGHT

## 2020-07-27 ASSESSMENT — PAIN DESCRIPTION - PAIN TYPE: TYPE: ACUTE PAIN

## 2020-07-27 ASSESSMENT — PAIN SCALES - GENERAL: PAINLEVEL_OUTOF10: 10

## 2020-07-28 LAB
ALBUMIN SERPL-MCNC: 4.4 GM/DL (ref 3.4–5)
ALP BLD-CCNC: 97 IU/L (ref 40–128)
ALT SERPL-CCNC: 11 U/L (ref 10–40)
ANION GAP SERPL CALCULATED.3IONS-SCNC: 10 MMOL/L (ref 4–16)
AST SERPL-CCNC: 11 IU/L (ref 15–37)
BASOPHILS ABSOLUTE: 0 K/CU MM
BASOPHILS RELATIVE PERCENT: 0.4 % (ref 0–1)
BILIRUB SERPL-MCNC: 0.2 MG/DL (ref 0–1)
BUN BLDV-MCNC: 11 MG/DL (ref 6–23)
CALCIUM SERPL-MCNC: 9.4 MG/DL (ref 8.3–10.6)
CHLORIDE BLD-SCNC: 104 MMOL/L (ref 99–110)
CO2: 29 MMOL/L (ref 21–32)
CREAT SERPL-MCNC: 0.8 MG/DL (ref 0.6–1.1)
DIFFERENTIAL TYPE: ABNORMAL
EOSINOPHILS ABSOLUTE: 0.2 K/CU MM
EOSINOPHILS RELATIVE PERCENT: 2 % (ref 0–3)
GFR AFRICAN AMERICAN: >60 ML/MIN/1.73M2
GFR NON-AFRICAN AMERICAN: >60 ML/MIN/1.73M2
GLUCOSE BLD-MCNC: 103 MG/DL (ref 70–99)
HCT VFR BLD CALC: 40.6 % (ref 37–47)
HEMOGLOBIN: 13.1 GM/DL (ref 12.5–16)
IMMATURE NEUTROPHIL %: 0.1 % (ref 0–0.43)
LACTATE: 0.9 MMOL/L (ref 0.4–2)
LIPASE: 13 IU/L (ref 13–60)
LYMPHOCYTES ABSOLUTE: 4 K/CU MM
LYMPHOCYTES RELATIVE PERCENT: 52.5 % (ref 24–44)
MCH RBC QN AUTO: 31.6 PG (ref 27–31)
MCHC RBC AUTO-ENTMCNC: 32.3 % (ref 32–36)
MCV RBC AUTO: 97.8 FL (ref 78–100)
MONOCYTES ABSOLUTE: 0.4 K/CU MM
MONOCYTES RELATIVE PERCENT: 5.2 % (ref 0–4)
NUCLEATED RBC %: 0 %
PDW BLD-RTO: 12.3 % (ref 11.7–14.9)
PLATELET # BLD: 284 K/CU MM (ref 140–440)
PMV BLD AUTO: 10.4 FL (ref 7.5–11.1)
POTASSIUM SERPL-SCNC: 3.8 MMOL/L (ref 3.5–5.1)
RBC # BLD: 4.15 M/CU MM (ref 4.2–5.4)
SEGMENTED NEUTROPHILS ABSOLUTE COUNT: 3.1 K/CU MM
SEGMENTED NEUTROPHILS RELATIVE PERCENT: 39.8 % (ref 36–66)
SODIUM BLD-SCNC: 143 MMOL/L (ref 135–145)
TOTAL IMMATURE NEUTOROPHIL: 0.01 K/CU MM
TOTAL NUCLEATED RBC: 0 K/CU MM
TOTAL PROTEIN: 7.1 GM/DL (ref 6.4–8.2)
WBC # BLD: 7.7 K/CU MM (ref 4–10.5)

## 2020-07-28 PROCEDURE — 83605 ASSAY OF LACTIC ACID: CPT

## 2020-07-28 PROCEDURE — 6360000002 HC RX W HCPCS: Performed by: EMERGENCY MEDICINE

## 2020-07-28 PROCEDURE — 83690 ASSAY OF LIPASE: CPT

## 2020-07-28 PROCEDURE — 85025 COMPLETE CBC W/AUTO DIFF WBC: CPT

## 2020-07-28 PROCEDURE — 80053 COMPREHEN METABOLIC PANEL: CPT

## 2020-07-28 PROCEDURE — 96374 THER/PROPH/DIAG INJ IV PUSH: CPT

## 2020-07-28 RX ORDER — FENTANYL CITRATE 50 UG/ML
25 INJECTION, SOLUTION INTRAMUSCULAR; INTRAVENOUS ONCE
Status: COMPLETED | OUTPATIENT
Start: 2020-07-28 | End: 2020-07-28

## 2020-07-28 RX ORDER — ACETAMINOPHEN 325 MG/1
650 TABLET ORAL ONCE
Status: DISCONTINUED | OUTPATIENT
Start: 2020-07-28 | End: 2020-07-28 | Stop reason: HOSPADM

## 2020-07-28 RX ADMIN — FENTANYL CITRATE 25 MCG: 50 INJECTION INTRAMUSCULAR; INTRAVENOUS at 00:21

## 2020-07-28 ASSESSMENT — PAIN SCALES - GENERAL: PAINLEVEL_OUTOF10: 8

## 2020-07-28 NOTE — ED PROVIDER NOTES
As physician-in-triage, I performed a medical screening history and physical exam on this patient. HISTORY OF PRESENT ILLNESS  Yancy Montemayor is a 38 y.o. female right upper quadrant abdominal pain that started at about 630 this evening. The pain is constant occasionally radiates to her back. No nausea or vomiting. No known fevers. PHYSICAL EXAM  /71   Pulse 71   Temp 97.3 °F (36.3 °C)   Resp 18   SpO2 100%     On exam, the patient appears in no acute distress. Speech is clear. Breathing is unlabored. Moves all extremities    Comment: Please note this report has been produced using speech recognition software and may contain errors related to that system including errors in grammar, punctuation, and spelling, as well as words and phrases that may be inappropriate. If there are any questions or concerns please feel free to contact the dictating provider for clarification.         Rizwan Cervantes MD  07/27/20 9593

## 2020-07-28 NOTE — ED PROVIDER NOTES
Emergency Department Encounter    Patient: Merna Tan  MRN: [id number]  : 1975  Date of Evaluation: 2020  ED Provider:  Deanna Birch      Triage Chief Complaint:   Abdominal Pain      Cachil DeHe:  Merna Tan is a 38 y.o. female that presents to the emergency department for evaluation. Patient notes that around 6:30 PM today, she developed right upper quadrant abdominal pain. She describes a dull, achy pain with occasional periods of sharp pain. Pain was localized to the right upper quadrant of the abdomen. She states the pain occasionally radiated around the flank to the back. She denies history of similar symptoms in the past.  She was at work, became worried and decided to come to the emergency department for evaluation. She denies associated nausea, vomiting, hematemesis, coffee-ground emesis, diarrhea, constipation, hematochezia, melena, dysuria, hematuria. Denies flank or abdominal trauma. Denies any recent NSAID use, steroid use, hospitalization, antibiotic use. Denies fevers, chills, diaphoresis, night sweats. Denies syncope, dizziness, lightheadedness, numbness, tingling, weakness, paresthesias, focal deficits. Denies chest pain or pleuritic pain. No history of DVT or PE. Denies additional precipitating, modifying, living factors.     ROS - see HPI, below listed is current ROS at time of my eval:  General:  No fevers, no chills, no weakness  Eyes:  no discharge  ENT:  No sore throat, no nasal congestion  Cardiovascular:  No chest pain, no palpitations  Respiratory:  No shortness of breath, no cough, no wheezing  Gastrointestinal:  + pain, no nausea, no vomiting, no diarrhea  Musculoskeletal:  No muscle pain, no joint pain  Skin:  No rash, no pruritis  Neurologic:  no headache  Genitourinary:  No dysuria, no hematuria  Endocrine:  No unexpected weight gain, no unexpected weight loss  Extremities:  no edema, no pain    Past Medical History:   Diagnosis Date    Anemia  Anxiety     Arthritis     Bursitis     Chronic back pain     \"pain since \"\"have sciatica    Incisional hernia, without obstruction or gangrene 2016    Migraines     infrequent    Psoriasis      Past Surgical History:   Procedure Laterality Date    BACK SURGERY  13    L3,4,5, S1- have tit. cage     SECTION      per old chart [address]      per old chart had thermachoice endo ablation and D & C done2009    HYSTERECTOMY      per old chart 2010-robotic LAVH with right S & O    PELVIC LAPAROSCOPY      per old chart 2008- d&C,laproscopcy,lysis of adhesion and removal cyst left ovary/ then 2009 had diag lap with laser, lysis of adhesions, left S & O and removal cyst right ovary    SHOULDER SURGERY Right     TONSILLECTOMY      per old chart T&A done [address]  [address] [place] [address]  2016    Incisional Ventral hernia repair    WISDOM TOOTH EXTRACTION  1's     Family History   Problem Relation Age of Onset    Cancer Mother         pancreatic and ovarian ca    High Blood Pressure Mother     Diabetes Mother     Asthma Mother     Heart Disease Brother         born with hole in heart , heart murmur     Social History     Socioeconomic History    Marital status:      Spouse name: Not on file    Number of children: Not on file    Years of education: Not on file    Highest education level: Not on file   Occupational History    Not on file   Social Needs    Financial resource strain: Not on file    Food insecurity     Worry: Not on file     Inability: Not on file    Transportation needs     Medical: Not on file     Non-medical: Not on file   Tobacco Use    Smoking status: Current Every Day Smoker     Packs/day: 0.75     Years: 31.00     Pack years: 23.25     Types: Cigarettes     Start date:     Smokeless tobacco: Never Used   Substance and Sexual Activity    Alcohol use: No    Drug use: No    Sexual activity: Not on file   Lifestyle    Physical activity     Days per week: Not on file     Minutes per session: Not on file    Stress: Not on file   Relationships    Social connections     Talks on phone: Not on file     Gets together: Not on file     Attends [employer] service: Not on file     Active member of club or organization: Not on file     Attends meetings of clubs or organizations: Not on file     Relationship status: Not on file    Intimate partner violence     Fear of current or ex partner: Not on file     Emotionally abused: Not on file     Physically abused: Not on file     Forced sexual activity: Not on file   Other Topics Concern    Not on file   Social History Narrative    Not on file     No current facility-administered medications for this encounter. Current Outpatient Medications   Medication Sig Dispense Refill    omeprazole (PRILOSEC) 20 MG delayed release capsule TAKE 1 CAPSULE BY MOUTH EVERY MORNING ON AN EMPTY STOMACH  1    oxyCODONE-acetaminophen (PERCOCET)  MG per tablet TAKE 1 TABLET BY MOUTH EVERY 6 HOURS AS NEEDED  30 DAYS  0    LYRICA 150 MG capsule 150 mg 2 times daily.        DULoxetine (CYMBALTA) 60 MG extended release capsule TAKE ONE CAPSULE BY MOUTH DAILY WITH 30MG  2    CHANTIX CONTINUING MONTH INGRID 1 MG tablet TAKE 1 TABLET BY MOUTH TWICE A DAY AFTER MEALS WITH A GLASS OF WATER  0    varenicline (CHANTIX) 1 MG tablet Chantix Continuing Month Box 1 mg tablet      ibuprofen (ADVIL;MOTRIN) 800 MG tablet Take 1 tablet by mouth every 6 hours as needed for Pain 30 tablet 0    oxybutynin (DITROPAN) 5 MG tablet Take by mouth 3 times daily      atorvastatin (LIPITOR) 20 MG tablet Take 20 mg by mouth daily      DULoxetine (CYMBALTA) 30 MG capsule Take 90 mg by mouth daily       baclofen (LIORESAL) 10 MG tablet Take 10 mg by mouth 2 times daily      estropipate (OGEN) 3 MG tablet Take 0.625 mg by mouth daily        Allergies   Allergen Reactions    Latex Other (See Comments)     Blister    blisters    Morphine Hives    Adhesive Tape Other (See Comments)     Blister    Aspirin Hives    Codeine Hives       Nursing Notes Reviewed    Physical Exam:  Triage VS:    ED Triage Vitals [07/27/20 1931]   Enc Vitals Group      /71      Pulse 71      Resp 18      Temp 97.3 °F (36.3 °C)      Temp src       SpO2 100 %      Weight       Height       Head Circumference       Peak Flow       Pain Score       Pain Loc       Pain Edu? Excl. in [address]? My pulse ox interpretation is - normal    General appearance:  No acute distress. Resting comfortably on the exam cart. Nontoxic in appearance. Skin:  Warm. Dry. No rash. No herpes zoster lesion. Neck: Supple. No nuchal rigidity. Trachea midline. Heart:  Regular rate and rhythm, normal S1 & S2.    Perfusion: Symmetric peripheral pulses. Respiratory:  Lungs clear to auscultation bilaterally. Respirations nonlabored. Abdominal:  + right upper abdominal tenderness to palpation, no rebound guarding or rigidity, neg Rao's sign, neg McBurney's point tenderness to palpation, normal bowel sounds, no masses, no organomegaly, no pulsatile masses  Back:  No CVA TTP  Extremity:    normal ROM   Neurological:  Alert and oriented times 3.       I have reviewed and interpreted all of the currently available lab results from this visit (if applicable):  Results for orders placed or performed during the hospital encounter of 07/27/20   CBC Auto Differential   Result Value Ref Range    WBC 7.7 4.0 - 10.5 K/CU MM    RBC 4.15 (L) 4.2 - 5.4 M/CU MM    Hemoglobin 13.1 12.5 - 16.0 GM/DL    Hematocrit 40.6 37 - 47 %    MCV 97.8 78 - 100 FL    MCH 31.6 (H) 27 - 31 PG    MCHC 32.3 32.0 - 36.0 %    RDW 12.3 11.7 - 14.9 %    Platelets 292 002 - 388 K/CU MM    MPV 10.4 7.5 - 11.1 FL    Differential Type AUTOMATED DIFFERENTIAL     Segs Relative 39.8 36 - 66 %    Lymphocytes % 52.5 (H) 24 - 44 %    Monocytes % 5.2 (H) 0 - 4 %    Eosinophils % 2.0 0 - 3 %    Basophils % 0.4 0 - 1 %    Segs Absolute 3.1 K/CU MM    Lymphocytes Absolute 4.0 K/CU MM    Monocytes Absolute 0.4 K/CU MM    Eosinophils Absolute 0.2 K/CU MM    Basophils Absolute 0.0 K/CU MM    Nucleated RBC % 0.0 %    Total Nucleated RBC 0.0 K/CU MM    Total Immature Neutrophil 0.01 K/CU MM    Immature Neutrophil % 0.1 0 - 0.43 %   Comprehensive Metabolic Panel w/ Reflex to MG   Result Value Ref Range    Sodium 143 135 - 145 MMOL/L    Potassium 3.8 3.5 - 5.1 MMOL/L    Chloride 104 99 - 110 mMol/L    CO2 29 21 - 32 MMOL/L    BUN 11 6 - 23 MG/DL    CREATININE 0.8 0.6 - 1.1 MG/DL    Glucose 103 (H) 70 - 99 MG/DL    Calcium 9.4 8.3 - 10.6 MG/DL    Alb 4.4 3.4 - 5.0 GM/DL    Total Protein 7.1 6.4 - 8.2 GM/DL    Total Bilirubin 0.2 0.0 - 1.0 MG/DL    ALT 11 10 - 40 U/L    AST 11 (L) 15 - 37 IU/L    Alkaline Phosphatase 97 40 - 128 IU/L    GFR Non-African American >60 >60 mL/min/1.73m2    GFR African American >60 >60 mL/min/1.73m2    Anion Gap 10 4 - 16   Lipase   Result Value Ref Range    Lipase 13 13 - 60 IU/L   Lactic Acid, Plasma   Result Value Ref Range    Lactate 0.9 0.4 - 2.0 mMOL/L      Radiographs (if obtained):  Radiologist's Report Reviewed:  Us Abdomen Limited    Result Date: 7/27/2020  EXAMINATION: RIGHT UPPER QUADRANT ULTRASOUND 7/27/2020 10:11 pm COMPARISON: CT abdomen and pelvis with contrast August 31, 2016. HISTORY: ORDERING SYSTEM PROVIDED HISTORY: RUQ pain TECHNOLOGIST PROVIDED HISTORY: Reason for exam:->RUQ pain Reason for Exam: RLQ pain Acuity: Acute Type of Exam: Initial FINDINGS: LIVER:  The liver demonstrates normal echogenicity without evidence of intrahepatic biliary ductal dilatation. BILIARY SYSTEM:  Gallbladder is contracted, unremarkable without evidence of pericholecystic fluid, wall thickening or stones. Negative sonographic Rao's sign.  Common bile duct is within normal limits measuring 2.0 mm. RIGHT KIDNEY: The right kidney is grossly unremarkable without evidence of hydronephrosis. PANCREAS:  Visualized portions of the pancreas are unremarkable. OTHER: No evidence of right upper quadrant ascites. Unremarkable right upper quadrant ultrasound. MDM:  Patient was seen and evaluated in the emergency department by myself. A thorough history of physical exam were performed, prior medical records reviewed. Upon arrival to the emergency department patient's vital signs were noted and were stable. No tachycardia, tachypnea, hypoxia. Blood pressure was within normal limits. She was afebrile. She was connected to continuous cardiopulmonary monitoring. Rhythm strip was interpreted by myself demonstrating sinus rhythm. Differential diagnosis and treatment plan were discussed. IV access is established and the patient is initiated on normal saline. 25 mcg of fentanyl were provided for pain. Pertinent labs were drawn and radiographic studies were performed. Once results revealed reviewed by myself. Labs demonstrated no leukocytosis, anemia, thrombocytopenia. Electrolytes were all within normals. No signs of kidney injury. Glucose within normal limits. AST and ALT are unremarkable. Lipase within normal limits. Bilirubins are unremarkable. Gallbladder ultrasound radiology report read unremarkable right upper quadrant ultrasound. On repeat evaluations, patient remained hemodynamically stable. She was eating a bag of chips and noted significant improvement in symptoms. In fact, she stated that her symptoms had resolved. She she requested discharge. Results of laboratory and radiographic data along with differential gnosis and treatment plan were discussed with the patient. She presumed acute upper quadrant abdominal pain. Symptoms concerning for abdominal pain. She was able to tolerate p.o. intake. Instructed to follow-up with primary care physician for reevaluation.   Instructed to return to the emergency department immediately with any new, worsening, concerning symptoms. Additional verbal and printed discharge instructions provided. Patient verbalized understanding was agreeable. She was discharged in stable condition    Clinical Impression:  1. Abdominal pain, right upper quadrant      Disposition referral (if applicable):  KARIN Mayen - SHARMILA  [facility]  [phone]      Please follow-up with your primary care provider for reevaluation. [address] [facility]  [place] [id number]  [phone]  Go to   Immediately with any new, worsening, concerning symptoms. ED Provider Disposition Time  DISPOSITION Decision To Discharge 07/28/2020 01:15:37 AM      Comment: Please note this report has been produced using speech recognition software and may contain errors related to that system including errors in grammar, punctuation, and spelling, as well as words and phrases that may be inappropriate. Efforts were made to edit the dictations.        [address] [facility],   07/28/20 0508

## 2020-11-23 ENCOUNTER — HOSPITAL ENCOUNTER (EMERGENCY)
Age: 45
Discharge: HOME | End: 2020-11-23
Attending: EMERGENCY MEDICINE
Payer: COMMERCIAL

## 2020-11-23 ENCOUNTER — APPOINTMENT (OUTPATIENT)
Dept: CT IMAGING | Age: 45
End: 2020-11-23
Payer: COMMERCIAL

## 2020-11-23 VITALS
WEIGHT: 215 LBS | RESPIRATION RATE: 18 BRPM | HEIGHT: 68 IN | HEART RATE: 80 BPM | SYSTOLIC BLOOD PRESSURE: 115 MMHG | TEMPERATURE: 97.6 F | OXYGEN SATURATION: 94 % | DIASTOLIC BLOOD PRESSURE: 70 MMHG | BODY MASS INDEX: 32.58 KG/M2

## 2020-11-23 LAB
ALBUMIN SERPL-MCNC: 4.2 GM/DL (ref 3.4–5)
ALP BLD-CCNC: 83 IU/L (ref 40–128)
ALT SERPL-CCNC: 15 U/L (ref 10–40)
ANION GAP SERPL CALCULATED.3IONS-SCNC: 9 MMOL/L (ref 4–16)
AST SERPL-CCNC: 13 IU/L (ref 15–37)
BACTERIA: NEGATIVE /HPF
BASE EXCESS MIXED: 3.6 (ref 0–2.3)
BASOPHILS ABSOLUTE: 0 K/CU MM
BASOPHILS RELATIVE PERCENT: 0.3 % (ref 0–1)
BILIRUB SERPL-MCNC: 0.2 MG/DL (ref 0–1)
BILIRUBIN URINE: NEGATIVE MG/DL
BLOOD, URINE: ABNORMAL
BUN BLDV-MCNC: 10 MG/DL (ref 6–23)
CALCIUM SERPL-MCNC: 9.4 MG/DL (ref 8.3–10.6)
CHLORIDE BLD-SCNC: 103 MMOL/L (ref 99–110)
CLARITY: CLEAR
CO2: 29 MMOL/L (ref 21–32)
COLOR: YELLOW
COMMENT: ABNORMAL
CREAT SERPL-MCNC: 0.8 MG/DL (ref 0.6–1.1)
DIFFERENTIAL TYPE: ABNORMAL
EOSINOPHILS ABSOLUTE: 0.1 K/CU MM
EOSINOPHILS RELATIVE PERCENT: 2 % (ref 0–3)
GFR AFRICAN AMERICAN: >60 ML/MIN/1.73M2
GFR NON-AFRICAN AMERICAN: >60 ML/MIN/1.73M2
GLUCOSE BLD-MCNC: 93 MG/DL (ref 70–99)
GLUCOSE, URINE: NEGATIVE MG/DL
HCO3 VENOUS: 30.1 MMOL/L (ref 19–25)
HCT VFR BLD CALC: 36.5 % (ref 37–47)
HEMOGLOBIN: 11.8 GM/DL (ref 12.5–16)
IMMATURE NEUTROPHIL %: 0.2 % (ref 0–0.43)
KETONES, URINE: NEGATIVE MG/DL
LEUKOCYTE ESTERASE, URINE: NEGATIVE
LIPASE: 9 IU/L (ref 13–60)
LYMPHOCYTES ABSOLUTE: 3.2 K/CU MM
LYMPHOCYTES RELATIVE PERCENT: 50.4 % (ref 24–44)
MAGNESIUM: 2.1 MG/DL (ref 1.8–2.4)
MCH RBC QN AUTO: 31.2 PG (ref 27–31)
MCHC RBC AUTO-ENTMCNC: 32.3 % (ref 32–36)
MCV RBC AUTO: 96.6 FL (ref 78–100)
MONOCYTES ABSOLUTE: 0.5 K/CU MM
MONOCYTES RELATIVE PERCENT: 7.2 % (ref 0–4)
MUCUS: ABNORMAL HPF
NITRITE URINE, QUANTITATIVE: NEGATIVE
NUCLEATED RBC %: 0 %
O2 SAT, VEN: 85.6 % (ref 50–70)
PCO2, VEN: 52 MMHG (ref 38–52)
PDW BLD-RTO: 12.2 % (ref 11.7–14.9)
PH VENOUS: 7.37 (ref 7.32–7.42)
PH, URINE: 5 (ref 5–8)
PLATELET # BLD: 260 K/CU MM (ref 140–440)
PMV BLD AUTO: 10.4 FL (ref 7.5–11.1)
PO2, VEN: 171 MMHG (ref 28–48)
POTASSIUM SERPL-SCNC: 3.1 MMOL/L (ref 3.5–5.1)
PROTEIN UA: 30 MG/DL
RBC # BLD: 3.78 M/CU MM (ref 4.2–5.4)
RBC URINE: 4 /HPF (ref 0–6)
SEGMENTED NEUTROPHILS ABSOLUTE COUNT: 2.6 K/CU MM
SEGMENTED NEUTROPHILS RELATIVE PERCENT: 39.9 % (ref 36–66)
SODIUM BLD-SCNC: 141 MMOL/L (ref 135–145)
SPECIFIC GRAVITY UA: 1.03 (ref 1–1.03)
SQUAMOUS EPITHELIAL: 2 /HPF
TOTAL IMMATURE NEUTOROPHIL: 0.01 K/CU MM
TOTAL NUCLEATED RBC: 0 K/CU MM
TOTAL PROTEIN: 7.1 GM/DL (ref 6.4–8.2)
TRICHOMONAS: ABNORMAL /HPF
UROBILINOGEN, URINE: NORMAL MG/DL (ref 0.2–1)
WBC # BLD: 6.4 K/CU MM (ref 4–10.5)
WBC UA: 1 /HPF (ref 0–5)

## 2020-11-23 PROCEDURE — 83690 ASSAY OF LIPASE: CPT

## 2020-11-23 PROCEDURE — 80053 COMPREHEN METABOLIC PANEL: CPT

## 2020-11-23 PROCEDURE — 87086 URINE CULTURE/COLONY COUNT: CPT

## 2020-11-23 PROCEDURE — 85025 COMPLETE CBC W/AUTO DIFF WBC: CPT

## 2020-11-23 PROCEDURE — 74177 CT ABD & PELVIS W/CONTRAST: CPT

## 2020-11-23 PROCEDURE — 96375 TX/PRO/DX INJ NEW DRUG ADDON: CPT

## 2020-11-23 PROCEDURE — 96365 THER/PROPH/DIAG IV INF INIT: CPT

## 2020-11-23 PROCEDURE — 81001 URINALYSIS AUTO W/SCOPE: CPT

## 2020-11-23 PROCEDURE — 82805 BLOOD GASES W/O2 SATURATION: CPT

## 2020-11-23 PROCEDURE — 6370000000 HC RX 637 (ALT 250 FOR IP): Performed by: EMERGENCY MEDICINE

## 2020-11-23 PROCEDURE — 83735 ASSAY OF MAGNESIUM: CPT

## 2020-11-23 PROCEDURE — 99285 EMERGENCY DEPT VISIT HI MDM: CPT

## 2020-11-23 PROCEDURE — 94761 N-INVAS EAR/PLS OXIMETRY MLT: CPT

## 2020-11-23 PROCEDURE — 6360000004 HC RX CONTRAST MEDICATION: Performed by: EMERGENCY MEDICINE

## 2020-11-23 PROCEDURE — 2580000003 HC RX 258: Performed by: EMERGENCY MEDICINE

## 2020-11-23 RX ORDER — DICYCLOMINE HCL 20 MG
20 TABLET ORAL 3 TIMES DAILY PRN
Qty: 120 TABLET | Refills: 3 | Status: ON HOLD | OUTPATIENT
Start: 2020-11-23 | End: 2022-04-01

## 2020-11-23 RX ORDER — POLYETHYLENE GLYCOL 3350 17 G/17G
17 POWDER, FOR SOLUTION ORAL DAILY PRN
Qty: 1530 G | Refills: 1 | Status: SHIPPED | OUTPATIENT
Start: 2020-11-23 | End: 2020-12-23

## 2020-11-23 RX ORDER — POLYETHYLENE GLYCOL 3350 17 G/17G
17 POWDER, FOR SOLUTION ORAL DAILY
Status: DISCONTINUED | OUTPATIENT
Start: 2020-11-23 | End: 2020-11-23 | Stop reason: HOSPADM

## 2020-11-23 RX ORDER — SODIUM CHLORIDE 0.9 % (FLUSH) 0.9 %
10 SYRINGE (ML) INJECTION 2 TIMES DAILY
Status: DISCONTINUED | OUTPATIENT
Start: 2020-11-23 | End: 2020-11-23 | Stop reason: HOSPADM

## 2020-11-23 RX ORDER — SODIUM CHLORIDE, SODIUM LACTATE, POTASSIUM CHLORIDE, CALCIUM CHLORIDE 600; 310; 30; 20 MG/100ML; MG/100ML; MG/100ML; MG/100ML
1000 INJECTION, SOLUTION INTRAVENOUS ONCE
Status: COMPLETED | OUTPATIENT
Start: 2020-11-23 | End: 2020-11-23

## 2020-11-23 RX ADMIN — IOPAMIDOL 75 ML: 755 INJECTION, SOLUTION INTRAVENOUS at 06:12

## 2020-11-23 RX ADMIN — POLYETHYLENE GLYCOL 3350 17 G: 17 POWDER, FOR SOLUTION ORAL at 07:20

## 2020-11-23 RX ADMIN — SODIUM CHLORIDE, POTASSIUM CHLORIDE, SODIUM LACTATE AND CALCIUM CHLORIDE 1000 ML: 600; 310; 30; 20 INJECTION, SOLUTION INTRAVENOUS at 04:54

## 2020-11-23 ASSESSMENT — ENCOUNTER SYMPTOMS
ABDOMINAL PAIN: 1
ANAL BLEEDING: 0
EYE REDNESS: 0
SHORTNESS OF BREATH: 0
VOMITING: 0
BLOOD IN STOOL: 1
CONSTIPATION: 0
SINUS PAIN: 0
BACK PAIN: 0
HEMATEMESIS: 0
HEMATOCHEZIA: 1
CHEST TIGHTNESS: 0
EYE PAIN: 0
RESPIRATORY NEGATIVE: 1
SINUS PRESSURE: 0
EYES NEGATIVE: 1
NAUSEA: 0
ABDOMINAL DISTENTION: 0
SORE THROAT: 0
COUGH: 0
WHEEZING: 0
RHINORRHEA: 0
FACIAL SWELLING: 0
EYE DISCHARGE: 0
DIARRHEA: 0

## 2020-11-23 NOTE — ED PROVIDER NOTES
[address] ENCOUNTER      Pt Name: Fermin Moore  MRN: [id number]  [place] 1975  Date of evaluation: 11/23/2020  Provider: Pedro Richmond DO    CHIEF COMPLAINT     No chief complaint on file. HISTORY OF PRESENT ILLNESS      Fermin Moore is a 41 y.o. female who presents to the emergency department  for No chief complaint on file. 72-year-old female presents emergency department chief complaint of abdominal cramping, rectal bleeding. Patient reports bright red blood per rectum with bowel movement and passed 1 medium sized clot. Patient denies any rectal pain. Patient denies diarrhea. Patient denies other associated symptoms. Specifically, no nausea, vomiting or diarrhea. The history is provided by the patient. No  was used. Rectal Bleeding   Quality:  Bright red and maroon  Amount:  Scant  Duration:  12 hours  Timing:  Rare  Chronicity:  New  Context: defecation and spontaneously    Context: not anal fissures, not anal penetration, not constipation, not diarrhea, not foreign body, not hemorrhoids, not rectal injury and not rectal pain    Similar prior episodes: no    Relieved by:  None tried  Worsened by:  Nothing  Ineffective treatments:  None tried  Associated symptoms: abdominal pain    Associated symptoms: no dizziness, no epistaxis, no fever, no hematemesis, no light-headedness, no loss of consciousness, no recent illness and no vomiting    Risk factors: no anticoagulant use, no hx of colorectal cancer, no hx of IBD, no liver disease, no NSAID use and no steroid use          Nursing Notes, Triage Notes & Vital Signs were reviewed. REVIEW OF SYSTEMS    (2-9 systems for level 4, 10 or more for level 5)     Review of Systems   Constitutional: Negative. Negative for chills, fatigue and fever. HENT: Negative.   Negative for facial swelling, nosebleeds, postnasal drip, rhinorrhea, sinus pressure, sinus pain and sore throat. Eyes: Negative. Negative for pain, discharge, redness and visual disturbance. Respiratory: Negative. Negative for cough, chest tightness, shortness of breath and wheezing. Cardiovascular: Negative. Negative for chest pain and palpitations. Gastrointestinal: Positive for abdominal pain, blood in stool and hematochezia. Negative for abdominal distention, anal bleeding, constipation, diarrhea, hematemesis, nausea and vomiting. Genitourinary: Negative. Negative for dysuria, flank pain, frequency, hematuria and urgency. Musculoskeletal: Negative. Negative for arthralgias, back pain, gait problem, myalgias, neck pain and neck stiffness. Skin: Negative. Negative for rash. Neurological: Negative. Negative for dizziness, loss of consciousness, speech difficulty, light-headedness, numbness and headaches. Psychiatric/Behavioral: Negative. Negative for agitation and confusion. The patient is not nervous/anxious. All other systems reviewed and are negative. Except as noted above the remainder of the review of systems was reviewed and negative. PAST MEDICAL HISTORY     Past Medical History:   Diagnosis Date    Anemia     Anxiety     Arthritis     Bursitis     Chronic back pain     \"pain since 2006\"\"have sciatica    Incisional hernia, without obstruction or gangrene 9/23/2016    Migraines     infrequent    Psoriasis        Prior to Admission medications    Medication Sig Start Date End Date Taking?  Authorizing Provider   dicyclomine (BENTYL) 20 MG tablet Take 1 tablet by mouth 3 times daily as needed (Abd cramps) 11/23/20  Yes Lauren Lyle DO   polyethylene glycol [facility]) 17 GM/SCOOP powder Take 17 g by mouth daily as needed (Constipation) 11/23/20 12/23/20 Yes Lauren Lyle DO   omeprazole (PRILOSEC) 20 MG delayed release capsule TAKE 1 CAPSULE BY MOUTH EVERY MORNING ON AN EMPTY STOMACH 3/12/19   Historical Provider, MD   oxyCODONE-acetaminophen (PERCOCET)  MG per tablet TAKE 1 TABLET BY MOUTH EVERY 6 HOURS AS NEEDED  30 DAYS 19   Historical Provider, MD   LYRICA 150 MG capsule 150 mg 2 times daily.   3/28/19   Historical Provider, MD   DULoxetine (CYMBALTA) 60 MG extended release capsule TAKE ONE CAPSULE BY MOUTH DAILY WITH 30MG 3/28/19   Historical Provider, MD   CHANTIX CONTINUING MONTH INGRID 1 MG tablet TAKE 1 TABLET BY MOUTH TWICE A DAY AFTER MEALS WITH A GLASS OF WATER 3/13/19   Historical Provider, MD   varenicline (CHANTIX) 1 MG tablet Chantix Continuing Month Box 1 mg tablet    Historical Provider, MD   ibuprofen (ADVIL;MOTRIN) 800 MG tablet Take 1 tablet by mouth every 6 hours as needed for Pain 4/15/19   Dunia Raymond PA-C   oxybutynin (DITROPAN) 5 MG tablet Take by mouth 3 times daily    Historical Provider, MD   atorvastatin (LIPITOR) 20 MG tablet Take 20 mg by mouth daily    Historical Provider, MD   DULoxetine (CYMBALTA) 30 MG capsule Take 90 mg by mouth daily     Historical Provider, MD   baclofen (LIORESAL) 10 MG tablet Take 10 mg by mouth 2 times daily    Historical Provider, MD   estropipate (OGEN) 3 MG tablet Take 0.625 mg by mouth daily     Historical Provider, MD        Patient Active Problem List   Diagnosis    Incisional hernia, without obstruction or gangrene    Bursitis    Degeneration of lumbosacral intervertebral disc    Idiopathic peripheral neuropathy    Lumbar post-laminectomy syndrome    Lumbar spondylosis    Trochanteric bursitis    Varicose veins of lower extremity with inflammation    Left ankle sprain    Foot drop, left         SURGICAL HISTORY       Past Surgical History:   Procedure Laterality Date    BACK SURGERY  13    L3,4,5, S1- have tit. cage     SECTION      per old chart [address]      per old chart had thermachoice endo ablation and D & C done2009    HYSTERECTOMY      per old chart 2010-robotic LAVH with right S & O    PELVIC LAPAROSCOPY      per old chart 4/2008- d&C,laproscopcy,lysis of adhesion and removal cyst left ovary/ then 8/2009 had diag lap with laser, lysis of adhesions, left S & O and removal cyst right ovary    SHOULDER SURGERY Right 2008    TONSILLECTOMY      per old chart T&A done [address]  2003    VENTRAL HERNIA REPAIR  09/23/2016    Incisional Ventral hernia repair    WISDOM TOOTH EXTRACTION  1990's         CURRENT MEDICATIONS       Discharge Medication List as of 11/23/2020  7:10 AM      CONTINUE these medications which have NOT CHANGED    Details   omeprazole (PRILOSEC) 20 MG delayed release capsule TAKE 1 CAPSULE BY MOUTH EVERY MORNING ON AN EMPTY STOMACH, R-1Historical Med      oxyCODONE-acetaminophen (PERCOCET)  MG per tablet TAKE 1 TABLET BY MOUTH EVERY 6 HOURS AS NEEDED  30 DAYS, R-0Historical Med      LYRICA 150 MG capsule 150 mg 2 times daily. , DAWHistorical Med      !! DULoxetine (CYMBALTA) 60 MG extended release capsule TAKE ONE CAPSULE BY MOUTH DAILY WITH 30MG, R-2Historical Med      !! CHANTIX CONTINUING MONTH INGRID 1 MG tablet TAKE 1 TABLET BY MOUTH TWICE A DAY AFTER MEALS WITH A GLASS OF WATER, R-0, DAWHistorical Med      !! varenicline (CHANTIX) 1 MG tablet Chantix Continuing Month Box 1 mg tabletHistorical Med      ibuprofen (ADVIL;MOTRIN) 800 MG tablet Take 1 tablet by mouth every 6 hours as needed for Pain, Disp-30 tablet, R-0Print      oxybutynin (DITROPAN) 5 MG tablet Take by mouth 3 times dailyHistorical Med      atorvastatin (LIPITOR) 20 MG tablet Take 20 mg by mouth dailyHistorical Med      !! DULoxetine (CYMBALTA) 30 MG capsule Take 90 mg by mouth daily Historical Med      baclofen (LIORESAL) 10 MG tablet Take 10 mg by mouth 2 times daily      estropipate (OGEN) 3 MG tablet Take 0.625 mg by mouth daily Historical Med       !! - Potential duplicate medications found. Please discuss with provider. ALLERGIES     Latex; Morphine;  Adhesive tape; Aspirin; and Codeine    FAMILY HISTORY       Family History   Problem Relation Age of Onset    Cancer Mother         pancreatic and ovarian ca    High Blood Pressure Mother     Diabetes Mother     Asthma Mother     Heart Disease Brother         born with hole in heart , heart murmur          SOCIAL HISTORY       Social History     Socioeconomic History    Marital status:      Spouse name: None    Number of children: None    Years of education: None    Highest education level: None   Occupational History    None   Social Needs    Financial resource strain: None    Food insecurity     Worry: None     Inability: None    Transportation needs     Medical: None     Non-medical: None   Tobacco Use    Smoking status: Current Every Day Smoker     Packs/day: 0.75     Years: 31.00     Pack years: 23.25     Types: Cigarettes     Start date: 1987    Smokeless tobacco: Never Used   Substance and Sexual Activity    Alcohol use: No    Drug use: No    Sexual activity: None   Lifestyle    Physical activity     Days per week: None     Minutes per session: None    Stress: None   Relationships    Social connections     Talks on phone: None     Gets together: None     Attends Sikh service: None     Active member of club or organization: None     Attends meetings of clubs or organizations: None     Relationship status: None    Intimate partner violence     Fear of current or ex partner: None     Emotionally abused: None     Physically abused: None     Forced sexual activity: None   Other Topics Concern    None   Social History Narrative    None       SCREENINGS    [place] Coma Scale  Eye Opening: Spontaneous  Best Verbal Response: Oriented  Best Motor Response: Obeys commands  [place] Coma Scale Score: 15          PHYSICAL EXAM    (up to 7 for level 4, 8 or more for level 5)     ED Triage Vitals [11/23/20 0359]   BP Temp Temp Source Pulse Resp SpO2 Height Weight   (!) 145/89 97.6 °F (36.4 °C) Oral 97 18 98 % 5' 8\" (1.727 m) 215 lb (97.5 kg)       Physical Exam  Vitals signs and nursing note reviewed. Constitutional:       General: She is not in acute distress. Appearance: She is well-developed. She is not diaphoretic. HENT:      Head: Normocephalic and atraumatic. Right Ear: External ear normal.      Left Ear: External ear normal.      Nose: Nose normal.      Mouth/Throat:      Pharynx: No oropharyngeal exudate. Eyes:      General: No scleral icterus. Right eye: No discharge. Left eye: No discharge. Pupils: Pupils are equal, round, and reactive to light. Neck:      Musculoskeletal: Normal range of motion. Thyroid: No thyromegaly. Vascular: No JVD. Trachea: No tracheal deviation. Cardiovascular:      Rate and Rhythm: Normal rate and regular rhythm. Heart sounds: Normal heart sounds. No murmur. No friction rub. No gallop. Pulmonary:      Effort: Pulmonary effort is normal. No respiratory distress. Breath sounds: Normal breath sounds. No stridor. No wheezing or rales. Chest:      Chest wall: No tenderness. Abdominal:      General: Bowel sounds are normal. There is no distension. Palpations: Abdomen is soft. There is no mass. Tenderness: There is abdominal tenderness. There is no guarding or rebound. Musculoskeletal: Normal range of motion. General: No tenderness or deformity. Lymphadenopathy:      Cervical: No cervical adenopathy. Skin:     General: Skin is warm. Capillary Refill: Capillary refill takes less than 2 seconds. Coloration: Skin is not pale. Findings: No erythema or rash. Neurological:      Mental Status: She is alert and oriented to person, place, and time. Cranial Nerves: No cranial nerve deficit. Sensory: No sensory deficit. Motor: No abnormal muscle tone.       Coordination: Coordination normal.      Deep Tendon Reflexes: Reflexes normal.   Psychiatric:         Behavior: Behavior normal.         Thought Content: Thought content normal.         Judgment: Judgment normal.         DIAGNOSTIC RESULTS     Labs Reviewed   CBC WITH AUTO DIFFERENTIAL - Abnormal; Notable for the following components:       Result Value    RBC 3.78 (*)     Hemoglobin 11.8 (*)     Hematocrit 36.5 (*)     MCH 31.2 (*)     Lymphocytes % 50.4 (*)     Monocytes % 7.2 (*)     All other components within normal limits   COMPREHENSIVE METABOLIC PANEL W/ REFLEX TO MG FOR LOW K - Abnormal; Notable for the following components:    Potassium 3.1 (*)     AST 13 (*)     All other components within normal limits   LIPASE - Abnormal; Notable for the following components:    Lipase 9 (*)     All other components within normal limits   URINALYSIS - Abnormal; Notable for the following components:    Blood, Urine MODERATE (*)     Protein, UA 30 (*)     Mucus, UA RARE (*)     All other components within normal limits   BLOOD GAS, VENOUS - Abnormal; Notable for the following components:    pO2, [place] 171 (*)     Base Exc, Mixed 3.6 (*)     HCO3, Venous 30.1 (*)     O2 Sat, [place] 85.6 (*)     All other components within normal limits   CULTURE, URINE   MAGNESIUM          EKG: All EKG's are interpreted by the Emergency Department Physician who either signs or Co-signs this chart in the absence of a cardiologist.       EKG Interpretation    Interpreted by emergency department physician    Randall Russell:     Non-plain film images such as CT, Ultrasound and MRI are read by the radiologist. Plain radiographic images are visualized and preliminarily interpreted by the emergency physician. Interpretation per the Radiologist below, if available at the time of this note:    CT ABDOMEN PELVIS W IV CONTRAST Additional Contrast? None   Final Result   No acute process in the abdomen or pelvis.                ED BEDSIDE ULTRASOUND:   Performed by ED Physician Brynn Banks DO       LABS:  Labs Reviewed   CBC WITH AUTO DIFFERENTIAL - Abnormal; Notable for the following components:       Result Value    RBC 3.78 (*)     Hemoglobin 11.8 (*)     Hematocrit 36.5 (*)     MCH 31.2 (*)     Lymphocytes % 50.4 (*)     Monocytes % 7.2 (*)     All other components within normal limits   COMPREHENSIVE METABOLIC PANEL W/ REFLEX TO MG FOR LOW K - Abnormal; Notable for the following components:    Potassium 3.1 (*)     AST 13 (*)     All other components within normal limits   LIPASE - Abnormal; Notable for the following components:    Lipase 9 (*)     All other components within normal limits   URINALYSIS - Abnormal; Notable for the following components:    Blood, Urine MODERATE (*)     Protein, UA 30 (*)     Mucus, UA RARE (*)     All other components within normal limits   BLOOD GAS, VENOUS - Abnormal; Notable for the following components:    pO2, [place] 171 (*)     Base Exc, Mixed 3.6 (*)     HCO3, Venous 30.1 (*)     O2 Sat, [place] 85.6 (*)     All other components within normal limits   CULTURE, URINE   MAGNESIUM       All other labs were within normal range or not returned as of this dictation. EMERGENCY DEPARTMENT COURSE and DIFFERENTIAL DIAGNOSIS/MDM:   Vitals:    Vitals:    11/23/20 0532 11/23/20 0636 11/23/20 0702 11/23/20 0709   BP:   115/70    Pulse:    80   Resp:       Temp:       TempSrc:       SpO2: 95% 96% 94%    Weight:       Height:               MDM  Number of Diagnoses or Management Options  Abdominal cramping:   BRBPR (bright red blood per rectum): Internal hemorrhoids:   Diagnosis management comments: 56-year-old female presents emergency department with chief complaint of bright red blood per rectum and passing of 1 clot in between bowel movements. Patient reports abdominal cramping but no focal abdominal pain. Patient reports no nausea or vomiting. Patient reports no other associated symptoms. Lab work is unremarkable and CT of the abdomen pelvis was negative for acute pathology.   Patient has no bleeding from the rectum and no signs of fissure or hemorrhoids on examination. Patient was given Bentyl and MiraLAX. GI referral was placed. Will discharge patient home with work excuse, return precautions and follow-up as noted. Amount and/or Complexity of Data Reviewed  Clinical lab tests: ordered and reviewed  Tests in the radiology section of CPT®: ordered and reviewed  Tests in the medicine section of CPT®: reviewed and ordered    Risk of Complications, Morbidity, and/or Mortality  Presenting problems: moderate  Diagnostic procedures: moderate  Management options: moderate    Critical Care  Total time providing critical care: < 30 minutes    Patient Progress  Patient progress: improved          -  Patient seen and evaluated in the emergency department. -  Triage and nursing notes reviewed and incorporated. -  Old chart records reviewed and incorporated. -  Work-up included:  See above  -  Results discussed with patient. REASSESSMENT          CRITICAL CARE TIME     This excludes seperately billable procedures and family discussion time. Critical care time provided for obtaining history, conducting a physical exam, performing and monitoring interventions, ordering, collecting and interpreting tests, and establishing medical decision-making. There was a potential for life/limb threatening pathology requiring close evaluation and intervention with concern for patient decompensation. CONSULTS:  None    PROCEDURES:  None performed unless otherwise noted below     Procedures        FINAL IMPRESSION      1. Abdominal cramping    2. Internal hemorrhoids    3.  BRBPR (bright red blood per rectum)          DISPOSITION/PLAN   DISPOSITION Decision To Discharge 11/23/2020 05:57:33 AM      PATIENT REFERRED TO:  Lio Dsouza MD  [address] [place] [address]  [phone]    In 3 days        DISCHARGE MEDICATIONS:  Discharge Medication List as of 11/23/2020  7:10 AM      START taking these medications    Details   dicyclomine (BENTYL) 20 MG tablet Take 1 tablet by mouth 3 times daily as needed (Abd cramps), Disp-120 tablet,R-3Print      polyethylene glycol (GLYCOLAX) 17 GM/SCOOP powder Take 17 g by mouth daily as needed (Constipation), Disp-1530 g,R-1Print             ED Provider Disposition Time  DISPOSITION Decision To Discharge 11/23/2020 05:57:33 AM      Appropriate personal protective equipment was worn during the patient's evaluation. These included surgical, eye protection, surgical mask or in 95 respirator and gloves. The patient was also placed in a surgical mask for the prevention of possible spread of respiratory viral illnesses. The Patient was instructed to read the package inserts with any medication that was prescribed. Major potential reactions and medication interactions were discussed. The Patient understands that there are numerous possible adverse reactions not covered. The patient was also instructed to arrange follow-up with his or her primary care provider for review of any pending labwork or incidental findings on any radiology results that were obtained. All efforts were made to discuss any incidental findings that require further monitoring. Controlled Substances Monitoring:     No flowsheet data found.     (Please note that portions of this note were completed with a voice recognition program.  Efforts were made to edit the dictations but occasionally words are mis-transcribed.)    Carissa Loyd DO (electronically signed)  Attending Emergency Physician            Carissa Loyd DO  11/23/20 3153

## 2020-11-23 NOTE — ED NOTES
Pt to ed with complaints of rectal bleeding. Pt states had a bowel movement around 0330 and noticed dark red in stool.       Akbar Knowles RN  11/23/20 2071

## 2020-11-24 LAB
CULTURE: NORMAL
Lab: NORMAL
SPECIMEN: NORMAL

## 2021-05-01 ENCOUNTER — APPOINTMENT (OUTPATIENT)
Dept: GENERAL RADIOLOGY | Age: 46
End: 2021-05-01
Payer: COMMERCIAL

## 2021-05-01 ENCOUNTER — HOSPITAL ENCOUNTER (EMERGENCY)
Age: 46
Discharge: HOME | End: 2021-05-01
Attending: EMERGENCY MEDICINE
Payer: COMMERCIAL

## 2021-05-01 VITALS
TEMPERATURE: 98.3 F | DIASTOLIC BLOOD PRESSURE: 82 MMHG | WEIGHT: 213 LBS | BODY MASS INDEX: 31.55 KG/M2 | HEIGHT: 69 IN | OXYGEN SATURATION: 100 % | RESPIRATION RATE: 16 BRPM | SYSTOLIC BLOOD PRESSURE: 124 MMHG | HEART RATE: 82 BPM

## 2021-05-01 DIAGNOSIS — S93.401A SPRAIN OF RIGHT ANKLE, UNSPECIFIED LIGAMENT, INITIAL ENCOUNTER: Primary | ICD-10-CM

## 2021-05-01 DIAGNOSIS — S93.402A SPRAIN OF LEFT ANKLE, UNSPECIFIED LIGAMENT, INITIAL ENCOUNTER: ICD-10-CM
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
## 2021-05-01 PROCEDURE — 73610 X-RAY EXAM OF ANKLE: CPT

## 2021-05-01 PROCEDURE — 99283 EMERGENCY DEPT VISIT LOW MDM: CPT

## 2021-05-01 ASSESSMENT — PAIN SCALES - GENERAL: PAINLEVEL_OUTOF10: 7

## 2021-05-01 NOTE — ED TRIAGE NOTES
Pt presents with B ankle pain after getting tripped by dog leash ~1500. L ankle is tender and swollen 2+edema non pitting. Pain is 7/10. R ankle is mild edema. Tried Ice, heat and icy hot and home pain medication without relief.

## 2021-05-01 NOTE — ED PROVIDER NOTES
EMERGENCY DEPARTMENT ENCOUNTER        PCP: KARIN Alfonso - SHARMILA    CHIEF COMPLAINT    Chief Complaint   Patient presents with    Ankle Pain     B ankle pain after getting tripped by dog leash ~1500. L ankle is tender and swollen 2+edema non pitting. Pain is 7/10. R ankle is mild edema. Tried Ice, heat and icy hot and home pain medication without relief. This patient was not evaluated by the attending physician. I have independently evaluated this patient. My supervising physician was available for consultation. HPI    Stas Stauffer is a 41 y.o. female who presents with pain localized in both ankles but the left is worse than rates. Onset was around 3 or 4 PM when she was walking her dog and the dog ran toward an animal causing her to trip on the leash twisting her left ankle and also hurting her right ankle. Patient has been ambulatory since this occurred. She did not hit her head and did not lose consciousness. Patient reports swelling of both ankles with left being worse than the right. She is on chronic pain medication and took Percocet at home as well as tried, cold compress, warm compress, and propping up her legs without improvement in pain. She is concerned she may have a fracture so she came to the ED for imaging. Duration of pain has been constant since onset. Characteristic of pain is described as burning and aching as well as sharp. Pain does not radiate. Aggravating factors are palpation and ambulation. Severity of pain is rated 7 out of 10. Patient denies any antiplatelet or anticoagulant agents. Patient denies any other injuries. Patient denies numbness, weakness, tingling, functional or motor deficit.       REVIEW OF SYSTEMS    General: No fever or chills  Skin: No lacerations or puncture wounds  Musculoskeletal: See HPI; No other joint pain    All other review of systems are negative  See HPI and nursing notes for additional information     PAST MEDICAL & (DITROPAN) 5 MG tablet Take by mouth 3 times daily      atorvastatin (LIPITOR) 20 MG tablet Take 20 mg by mouth daily      DULoxetine (CYMBALTA) 30 MG capsule Take 90 mg by mouth daily       baclofen (LIORESAL) 10 MG tablet Take 10 mg by mouth 2 times daily      estropipate (OGEN) 3 MG tablet Take 0.625 mg by mouth daily          ALLERGIES    Allergies   Allergen Reactions    Latex Other (See Comments)     Blister    blisters    Morphine Hives    Adhesive Tape Other (See Comments)     Blister    Aspirin Hives     Sometimes can tolerate    Codeine Hives       SOCIAL & FAMILY HISTORY    Social History     Socioeconomic History    Marital status:      Spouse name: None    Number of children: None    Years of education: None    Highest education level: None   Occupational History    None   Social Needs    Financial resource strain: None    Food insecurity     Worry: None     Inability: None    Transportation needs     Medical: None     Non-medical: None   Tobacco Use    Smoking status: Current Every Day Smoker     Packs/day: 0.75     Years: 31.00     Pack years: 23.25     Types: Cigarettes     Start date: 1987    Smokeless tobacco: Never Used   Substance and Sexual Activity    Alcohol use: No    Drug use: No    Sexual activity: None   Lifestyle    Physical activity     Days per week: None     Minutes per session: None    Stress: None   Relationships    Social connections     Talks on phone: None     Gets together: None     Attends Jewish service: None     Active member of club or organization: None     Attends meetings of clubs or organizations: None     Relationship status: None    Intimate partner violence     Fear of current or ex partner: None     Emotionally abused: None     Physically abused: None     Forced sexual activity: None   Other Topics Concern    None   Social History Narrative    None     Family History   Problem Relation Age of Onset    Cancer Mother pancreatic and ovarian ca    High Blood Pressure Mother     Diabetes Mother     Asthma Mother     Heart Disease Brother         born with hole in heart , heart murmur         PHYSICAL EXAM    VITAL SIGNS: /82   Pulse 82   Temp 98.3 °F (36.8 °C) (Oral)   Resp 16   Ht 5' 8.5\" (1.74 m)   Wt 213 lb (96.6 kg)   SpO2 100%   BMI 31.92 kg/m²   Constitutional:  Well developed, appears comfortable  HENT:  Atraumatic  Respiratory:  Nonlabored breathing  Musculoskeletal: The Right ankle demonstrates mild generalized soft tissue swelling, greatest over lateral aspect. No gross deformity. No discoloration. There is tenderness to palpation over ATFL region and lateral malleolus. No TTP of medial malleolus. No point tenderness. Full active range of motion. The ankle joint is stable. Achilles tendon clinically intact on exam.     Affected extremity with 5/5 strength and distally neurovascularly intact. No swelling, discoloration, or tenderness to palpation of the proximal to distal lower leg bones, foot bones/toes. No fibular head tenderness. The Left ankle demonstrates moderate generalized soft tissue swelling, greatest over lateral aspect. No gross deformity. No discoloration. There is tenderness to palpation over ATFL region and lateral malleolus. No TTP of medial malleolus. No point tenderness. Full active range of motion. The ankle joint is stable. Achilles tendon clinically intact on exam.     Affected extremity with 5/5 strength and distally neurovascularly intact. No swelling, discoloration, or tenderness to palpation of the proximal to distal lower leg bones, foot bones/toes. No fibular head tenderness. Vascular:  DP pulse 2+, capillary refill intact. Integument:  No open wounds. Neurologic:  Awake alert, no slurred speech     RADIOLOGY   XR ANKLE LEFT (MIN 3 VIEWS)   Final Result   Soft tissue swelling without acute radiographic evidence of fracture or   dislocation.          XR ANKLE RIGHT (MIN 3 VIEWS)   Final Result   Soft tissue swelling without radiographic evidence of acute fracture or   dislocation. PROCEDURES   SPLINT PLACEMENT     An air-cast splint was applied to left lower extremity by the emergency department nurse for left ankle sprain. The splint is in good position. The patient's extremity is neurovascularly intact before and after the splint placement. ED COURSE & MEDICAL DECISION MAKING       Vital signs and nursing notes reviewed during ED course. I have independently evaluated this patient. Supervising physician present in the Emergency Department, available for consultation, throughout entirety of patient care. All pertinent Lab data and radiographic results reviewed with patient at bedside. History and exam consistent with mechanical fall resulting in bilateral ankle sprains left worse than right. While in the ED today, bilateral ankle xrays were obtained and revealed soft tissue swelling without evidence of fracture or dislocation. Patient received air cast splint, ace wrap and crutches while in the ED today. Patient is to advance off of crutches to full weightbearing as tolerated. I recommended rest, cryotherapy and elevation to patient. Affected extremity is neurovascularly intact on exam today and I have low clinical suspicion for vascular or nerve injury on exam today. Patient is nontoxic appearing. Vital signs are stable. Patient is stable for outpatient management. The patient and/or the family were informed of the results of any tests/labs/imaging, the treatment plan, and time was allotted to answer questions. Diagnosis and plan discussed in detail with patient who understands and agrees. Patient is comfortable with discharge at this time. Patient understands and agrees to follow up with PCP in 2-3 days for recheck, and follow up with orthopedist in 5-7 days if symptoms do not improve in that time.  Patient understands and agrees to return to emergency department if symptoms worsen or any new symptoms develop. Clinical  IMPRESSION    1. Sprain of right ankle, unspecified ligament, initial encounter    2. Sprain of left ankle, unspecified ligament, initial encounter    3. Fall from slip, trip, or stumble, initial encounter         Disposition referral (if applicable):  Mely Griffin, APRN - CNP  [id number] [address] [id number]    Call today  Recheck in 2-3 days    Ema Yanez MD  [address] [address]  [phone]    Call   Recheck in 5-7 days, As needed    [facility] Emergency Department  [facility] [phone]  [phone]  Go to   Return to ED if symptoms worsen or new symptoms      Disposition medications (if applicable):  Discharge Medication List as of 5/1/2021  4:48 AM          Comment: Please note this report has been produced using speech recognition software and may contain errors related to that system including errors in grammar, punctuation, and spelling, as well as words and phrases that may be inappropriate. If there are any questions or concerns please feel free to contact the dictating provider for clarification.         Sina Rodriguez PA-C  05/03/21 6040

## 2022-01-11 ENCOUNTER — APPOINTMENT (OUTPATIENT)
Dept: MRI IMAGING | Age: 47
End: 2022-01-11
Payer: COMMERCIAL

## 2022-01-11 ENCOUNTER — APPOINTMENT (OUTPATIENT)
Dept: GENERAL RADIOLOGY | Age: 47
End: 2022-01-11
Payer: COMMERCIAL

## 2022-01-11 ENCOUNTER — APPOINTMENT (OUTPATIENT)
Dept: CT IMAGING | Age: 47
End: 2022-01-11
Payer: COMMERCIAL

## 2022-01-11 ENCOUNTER — HOSPITAL ENCOUNTER (EMERGENCY)
Age: 47
Discharge: HOME | End: 2022-01-11
Attending: EMERGENCY MEDICINE
Payer: COMMERCIAL

## 2022-01-11 VITALS
OXYGEN SATURATION: 100 % | SYSTOLIC BLOOD PRESSURE: 139 MMHG | DIASTOLIC BLOOD PRESSURE: 87 MMHG | RESPIRATION RATE: 18 BRPM | TEMPERATURE: 102.1 F | HEART RATE: 89 BPM

## 2022-01-11 DIAGNOSIS — M54.9 BACK PAIN, UNSPECIFIED BACK LOCATION, UNSPECIFIED BACK PAIN LATERALITY, UNSPECIFIED CHRONICITY: Primary | ICD-10-CM

## 2022-01-11 LAB
ANION GAP SERPL CALCULATED.3IONS-SCNC: 12 MMOL/L (ref 4–16)
BACTERIA: ABNORMAL /HPF
BASOPHILS ABSOLUTE: 0 K/CU MM
BASOPHILS RELATIVE PERCENT: 0.3 % (ref 0–1)
BILIRUBIN URINE: NEGATIVE MG/DL
BLOOD, URINE: ABNORMAL
BUN BLDV-MCNC: 6 MG/DL (ref 6–23)
CALCIUM SERPL-MCNC: 9 MG/DL (ref 8.3–10.6)
CHLORIDE BLD-SCNC: 102 MMOL/L (ref 99–110)
CLARITY: CLEAR
CO2: 26 MMOL/L (ref 21–32)
COLOR: YELLOW
CREAT SERPL-MCNC: 0.5 MG/DL (ref 0.6–1.1)
DIFFERENTIAL TYPE: ABNORMAL
EOSINOPHILS ABSOLUTE: 0 K/CU MM
EOSINOPHILS RELATIVE PERCENT: 0 % (ref 0–3)
ERYTHROCYTE SEDIMENTATION RATE: 22 MM/HR (ref 0–20)
GFR AFRICAN AMERICAN: >60 ML/MIN/1.73M2
GFR NON-AFRICAN AMERICAN: >60 ML/MIN/1.73M2
GLUCOSE BLD-MCNC: 94 MG/DL (ref 70–99)
GLUCOSE, URINE: NEGATIVE MG/DL
HCT VFR BLD CALC: 36.9 % (ref 37–47)
HEMOGLOBIN: 12.1 GM/DL (ref 12.5–16)
HIGH SENSITIVE C-REACTIVE PROTEIN: 83.6 MG/L
IMMATURE NEUTROPHIL %: 0.3 % (ref 0–0.43)
INTERPRETATION: NORMAL
KETONES, URINE: NEGATIVE MG/DL
LACTIC ACID, SEPSIS: 1.2 MMOL/L (ref 0.5–1.9)
LEUKOCYTE ESTERASE, URINE: NEGATIVE
LYMPHOCYTES ABSOLUTE: 1.1 K/CU MM
LYMPHOCYTES RELATIVE PERCENT: 32.6 % (ref 24–44)
MCH RBC QN AUTO: 31 PG (ref 27–31)
MCHC RBC AUTO-ENTMCNC: 32.8 % (ref 32–36)
MCV RBC AUTO: 94.6 FL (ref 78–100)
MONOCYTES ABSOLUTE: 0.2 K/CU MM
MONOCYTES RELATIVE PERCENT: 4.6 % (ref 0–4)
MUCUS: ABNORMAL HPF
NITRITE URINE, QUANTITATIVE: NEGATIVE
NUCLEATED RBC %: 0 %
PDW BLD-RTO: 12 % (ref 11.7–14.9)
PH, URINE: 7.5 (ref 5–8)
PLATELET # BLD: 179 K/CU MM (ref 140–440)
PMV BLD AUTO: 10.4 FL (ref 7.5–11.1)
POTASSIUM SERPL-SCNC: 3.8 MMOL/L (ref 3.5–5.1)
PREGNANCY, URINE: NEGATIVE
PROTEIN UA: ABNORMAL MG/DL
RAPID INFLUENZA  B AGN: NEGATIVE
RAPID INFLUENZA A AGN: NEGATIVE
RBC # BLD: 3.9 M/CU MM (ref 4.2–5.4)
RBC URINE: 8 /HPF (ref 0–6)
SARS-COV-2, NAAT: NOT DETECTED
SEGMENTED NEUTROPHILS ABSOLUTE COUNT: 2 K/CU MM
SEGMENTED NEUTROPHILS RELATIVE PERCENT: 62.2 % (ref 36–66)
SODIUM BLD-SCNC: 140 MMOL/L (ref 135–145)
SOURCE: NORMAL
SPECIFIC GRAVITY UA: 1.01 (ref 1–1.03)
SPECIFIC GRAVITY, URINE: 1.01 (ref 1–1.03)
SQUAMOUS EPITHELIAL: 5 /HPF
TOTAL IMMATURE NEUTOROPHIL: 0.01 K/CU MM
TOTAL NUCLEATED RBC: 0 K/CU MM
UROBILINOGEN, URINE: NORMAL MG/DL (ref 0.2–1)
WBC # BLD: 3.3 K/CU MM (ref 4–10.5)
WBC UA: 1 /HPF (ref 0–5)

## 2022-01-11 PROCEDURE — 96374 THER/PROPH/DIAG INJ IV PUSH: CPT

## 2022-01-11 PROCEDURE — 72148 MRI LUMBAR SPINE W/O DYE: CPT

## 2022-01-11 PROCEDURE — 81001 URINALYSIS AUTO W/SCOPE: CPT

## 2022-01-11 PROCEDURE — 6360000002 HC RX W HCPCS: Performed by: EMERGENCY MEDICINE

## 2022-01-11 PROCEDURE — 74176 CT ABD & PELVIS W/O CONTRAST: CPT

## 2022-01-11 PROCEDURE — 86141 C-REACTIVE PROTEIN HS: CPT

## 2022-01-11 PROCEDURE — 74019 RADEX ABDOMEN 2 VIEWS: CPT

## 2022-01-11 PROCEDURE — 72141 MRI NECK SPINE W/O DYE: CPT

## 2022-01-11 PROCEDURE — 6370000000 HC RX 637 (ALT 250 FOR IP): Performed by: PHYSICIAN ASSISTANT

## 2022-01-11 PROCEDURE — 71046 X-RAY EXAM CHEST 2 VIEWS: CPT

## 2022-01-11 PROCEDURE — 85025 COMPLETE CBC W/AUTO DIFF WBC: CPT

## 2022-01-11 PROCEDURE — 80048 BASIC METABOLIC PNL TOTAL CA: CPT

## 2022-01-11 PROCEDURE — 87804 INFLUENZA ASSAY W/OPTIC: CPT

## 2022-01-11 PROCEDURE — 99284 EMERGENCY DEPT VISIT MOD MDM: CPT

## 2022-01-11 PROCEDURE — 72146 MRI CHEST SPINE W/O DYE: CPT

## 2022-01-11 PROCEDURE — 87040 BLOOD CULTURE FOR BACTERIA: CPT

## 2022-01-11 PROCEDURE — 87635 SARS-COV-2 COVID-19 AMP PRB: CPT

## 2022-01-11 PROCEDURE — 83605 ASSAY OF LACTIC ACID: CPT

## 2022-01-11 PROCEDURE — 96372 THER/PROPH/DIAG INJ SC/IM: CPT

## 2022-01-11 PROCEDURE — 96375 TX/PRO/DX INJ NEW DRUG ADDON: CPT

## 2022-01-11 PROCEDURE — 85652 RBC SED RATE AUTOMATED: CPT

## 2022-01-11 PROCEDURE — 6360000002 HC RX W HCPCS: Performed by: PHYSICIAN ASSISTANT

## 2022-01-11 PROCEDURE — 81025 URINE PREGNANCY TEST: CPT

## 2022-01-11 RX ORDER — FENTANYL CITRATE 50 UG/ML
25 INJECTION, SOLUTION INTRAMUSCULAR; INTRAVENOUS ONCE
Status: COMPLETED | OUTPATIENT
Start: 2022-01-11 | End: 2022-01-11

## 2022-01-11 RX ORDER — OXYCODONE HYDROCHLORIDE AND ACETAMINOPHEN 5; 325 MG/1; MG/1
2 TABLET ORAL ONCE
Status: COMPLETED | OUTPATIENT
Start: 2022-01-11 | End: 2022-01-11

## 2022-01-11 RX ORDER — LIDOCAINE 50 MG/G
1 PATCH TOPICAL DAILY
Qty: 30 PATCH | Refills: 0 | Status: ON HOLD | OUTPATIENT
Start: 2022-01-11 | End: 2022-04-01

## 2022-01-11 RX ORDER — ONDANSETRON 2 MG/ML
4 INJECTION INTRAMUSCULAR; INTRAVENOUS ONCE
Status: COMPLETED | OUTPATIENT
Start: 2022-01-11 | End: 2022-01-11

## 2022-01-11 RX ORDER — KETOROLAC TROMETHAMINE 30 MG/ML
15 INJECTION, SOLUTION INTRAMUSCULAR; INTRAVENOUS ONCE
Status: COMPLETED | OUTPATIENT
Start: 2022-01-11 | End: 2022-01-11

## 2022-01-11 RX ORDER — HYDROMORPHONE HCL 110MG/55ML
1 PATIENT CONTROLLED ANALGESIA SYRINGE INTRAVENOUS EVERY 30 MIN PRN
Status: DISCONTINUED | OUTPATIENT
Start: 2022-01-11 | End: 2022-01-12 | Stop reason: HOSPADM

## 2022-01-11 RX ORDER — ACETAMINOPHEN 500 MG
1000 TABLET ORAL ONCE
Status: DISCONTINUED | OUTPATIENT
Start: 2022-01-11 | End: 2022-01-12 | Stop reason: HOSPADM

## 2022-01-11 RX ADMIN — KETOROLAC TROMETHAMINE 15 MG: 30 INJECTION, SOLUTION INTRAMUSCULAR; INTRAVENOUS at 12:43

## 2022-01-11 RX ADMIN — FENTANYL CITRATE 25 MCG: 50 INJECTION, SOLUTION INTRAMUSCULAR; INTRAVENOUS at 15:34

## 2022-01-11 RX ADMIN — HYDROMORPHONE HYDROCHLORIDE 1 MG: 2 INJECTION INTRAMUSCULAR; INTRAVENOUS; SUBCUTANEOUS at 18:09

## 2022-01-11 RX ADMIN — OXYCODONE HYDROCHLORIDE AND ACETAMINOPHEN 2 TABLET: 5; 325 TABLET ORAL at 12:44

## 2022-01-11 RX ADMIN — ONDANSETRON 4 MG: 2 INJECTION INTRAMUSCULAR; INTRAVENOUS at 15:34

## 2022-01-11 ASSESSMENT — PAIN SCALES - GENERAL
PAINLEVEL_OUTOF10: 8
PAINLEVEL_OUTOF10: 10
PAINLEVEL_OUTOF10: 8
PAINLEVEL_OUTOF10: 8

## 2022-01-11 NOTE — ED PROVIDER NOTES
1/11/22paquiles Garrison was checked out to me by Dr. Javed Huitron. Please see his/her initial documentation for details of the patient's ED presentation, physical exam and completed studies. In brief, Dallas Garrison is a 40 y.o. female that presents with back pain/fever/cough awaiting labs/imaging.     I have reviewed and interpreted all of the currently available lab results from this visit (if applicable):  Results for orders placed or performed during the hospital encounter of 01/11/22   COVID-19, Rapid    Specimen: Nasopharyngeal   Result Value Ref Range    Source THROAT     SARS-CoV-2, NAAT NOT DETECTED NOT DETECTED   Rapid Flu Swab    Specimen: Nasopharyngeal   Result Value Ref Range    Rapid Influenza A Ag NEGATIVE NEGATIVE    Rapid Influenza B Ag NEGATIVE NEGATIVE   Urinalysis (Lab)   Result Value Ref Range    Color, UA YELLOW (A) YELLOW    Clarity, UA CLEAR CLEAR    Glucose, Urine NEGATIVE NEGATIVE MG/DL    Bilirubin Urine NEGATIVE NEGATIVE MG/DL    Ketones, Urine NEGATIVE NEGATIVE MG/DL    Specific Gravity, UA 1.010 1.001 - 1.035    Blood, Urine MODERATE (A) NEGATIVE    pH, Urine 7.5 5.0 - 8.0    Protein, UA TRACE (A) NEGATIVE MG/DL    Urobilinogen, Urine NORMAL 0.2 - 1.0 MG/DL    Nitrite Urine, Quantitative NEGATIVE NEGATIVE    Leukocyte Esterase, Urine NEGATIVE NEGATIVE    RBC, UA 8 (H) 0 - 6 /HPF    WBC, UA 1 0 - 5 /HPF    Bacteria, UA RARE (A) NEGATIVE /HPF    Squam Epithel, UA 5 /HPF    Mucus, UA RARE (A) NEGATIVE HPF   HCG, Urine, Qualitative, Pregnancy (Lab)   Result Value Ref Range    Pregnancy, Urine NEGATIVE NEGATIVE    Specific Gravity, Urine 1.010 1.001 - 1.035    Interpretation HCG METHOD LIMITATIONS:    CBC Auto Differential   Result Value Ref Range    WBC 3.3 (L) 4.0 - 10.5 K/CU MM    RBC 3.90 (L) 4.2 - 5.4 M/CU MM    Hemoglobin 12.1 (L) 12.5 - 16.0 GM/DL    Hematocrit 36.9 (L) 37 - 47 %    MCV 94.6 78 - 100 FL    MCH 31.0 27 - 31 PG    MCHC 32.8 32.0 - 36.0 %    RDW 12.0 11.7 - 14.9 %    Platelets 630 770 - 038 K/CU MM    MPV 10.4 7.5 - 11.1 FL    Differential Type AUTOMATED DIFFERENTIAL     Segs Relative 62.2 36 - 66 %    Lymphocytes % 32.6 24 - 44 %    Monocytes % 4.6 (H) 0 - 4 %    Eosinophils % 0.0 0 - 3 %    Basophils % 0.3 0 - 1 %    Segs Absolute 2.0 K/CU MM    Lymphocytes Absolute 1.1 K/CU MM    Monocytes Absolute 0.2 K/CU MM    Eosinophils Absolute 0.0 K/CU MM    Basophils Absolute 0.0 K/CU MM    Nucleated RBC % 0.0 %    Total Nucleated RBC 0.0 K/CU MM    Total Immature Neutrophil 0.01 K/CU MM    Immature Neutrophil % 0.3 0 - 0.43 %   Basic Metabolic Panel w/ Reflex to MG   Result Value Ref Range    Sodium 140 135 - 145 MMOL/L    Potassium 3.8 3.5 - 5.1 MMOL/L    Chloride 102 99 - 110 mMol/L    CO2 26 21 - 32 MMOL/L    Anion Gap 12 4 - 16    BUN 6 6 - 23 MG/DL    CREATININE 0.5 (L) 0.6 - 1.1 MG/DL    Glucose 94 70 - 99 MG/DL    Calcium 9.0 8.3 - 10.6 MG/DL    GFR Non-African American >60 >60 mL/min/1.73m2    GFR African American >60 >60 mL/min/1.73m2   Lactate, Sepsis   Result Value Ref Range    Lactic Acid, Sepsis 1.2 0.5 - 1.9 mMOL/L   C-Reactive Protein   Result Value Ref Range    CRP, High Sensitivity 83.6 mg/L   Sedimentation Rate   Result Value Ref Range    Sed Rate 22 (H) 0 - 20 MM/HR       MDM:    Patient here with back pain/fever/cough awaiting labs/imaging. Patient had negative labs imaging. On repeat examination she is on her phone talking no distress vital signs are stable. Unclear etiology of her fever back pain no signs of abscess or epidural hematoma or discitis or osteomyelitis. Patient stable discharge home she is on pain medicine at home given return precautions and follow-up information. Stable. Final Impression:  1.  Back pain, unspecified back location, unspecified back pain laterality, unspecified chronicity        (Please note that portions of this note may have been completed with a voice recognition program. Efforts were made to edit the dictations but occasionally words are mis-transcribed.)    Vick Graham, DO Vick Graham,   01/11/22 2055

## 2022-01-11 NOTE — ED PROVIDER NOTES
Emergency Department Encounter    Patient: Teri Victoria  MRN: [id number]  : 1975  Date of Evaluation: 2022  ED Provider:  Ruby Diamond MD    Triage Chief Complaint:   Fever and Back Pain    [place]:  Teri Victoria is a 40 y.o. female that presents complaints of a mild cough, body aching all over and worsening lower back pain. Patient has a history of low back pain and had a pain stimulator removed a week ago. States that the pain in her back does radiate into her upper back. States it is worse than usual.  Denies any urinary symptoms, abdominal pain. States she feels like bilateral lower extremities feel slightly weaker than they normally are. Denies any saddle anesthesia, bowel or bladder incontinence, urinary retention. Denies any chest pain or shortness of breath. States that she has been having intermittent fevers at home for the past 3 days and has a mild headache as well. Headaches not the worst headache of her life and is gradual onset. Denies a history of IV drug use. Patient has received her COVID and influenza vaccinations.     ROS - see HPI, below listed is current ROS at time of my eval:  General:  + fevers, +chills, no weakness  Eyes:  No recent vison changes, no discharge  ENT:  No sore throat, no nasal congestion, no hearing changes  Cardiovascular:  No chest pain, no palpitations  Respiratory:  No shortness of breath, no cough, no wheezing  Gastrointestinal:  No pain, no nausea, no vomiting, no diarrhea  Musculoskeletal:  +back pain  Skin:  No rash, no pruritis, no easy bruising  Neurologic:  No speech problems, no headache, no extremity numbness, no extremity tingling, no extremity weakness  Psychiatric:  No anxiety  Genitourinary:  No dysuria, no hematuria  Endocrine:  No unexpected weight gain, no unexpected weight loss  Extremities:  no edema, no pain    Past Medical History:   Diagnosis Date    Anemia     Anxiety     Arthritis     Bursitis     Chronic back pain     \"pain since \"\"have sciatica    Incisional hernia, without obstruction or gangrene 2016    Migraines     infrequent    Psoriasis      Past Surgical History:   Procedure Laterality Date    BACK SURGERY  13    L3,4,5, S1- have tit. cage     SECTION      per old chart [address]      per old chart had thermachoice endo ablation and D & C done2009    HYSTERECTOMY      per old chart 2010-robotic LAVH with right S & O    PELVIC LAPAROSCOPY      per old chart 2008- d&C,laproscopcy,lysis of adhesion and removal cyst left ovary/ then 2009 had diag lap with laser, lysis of adhesions, left S & O and removal cyst right ovary    SHOULDER SURGERY Right     TONSILLECTOMY      per old chart T&A done [address]     [facility]    Incisional Ventral hernia repair    WISDOM TOOTH EXTRACTION  1's     Family History   Problem Relation Age of Onset    Cancer Mother         pancreatic and ovarian ca    High Blood Pressure Mother     Diabetes Mother     Asthma Mother     Heart Disease Brother         born with hole in heart , heart murmur     Social History     Socioeconomic History    Marital status:      Spouse name: Not on file    Number of children: Not on file    Years of education: Not on file    Highest education level: Not on file   Occupational History    Not on file   Tobacco Use    Smoking status: Current Every Day Smoker     Packs/day: 0.75     Years: 31.00     Pack years: 23.25     Types: Cigarettes     Start date:     Smokeless tobacco: Never Used   Vaping Use    Vaping Use: Never used   Substance and Sexual Activity    Alcohol use: No    Drug use: No    Sexual activity: Not on file   Other Topics Concern    Not on file   Social History Narrative    Not on file     Social Determinants of Health     Financial Resource Strain:     Difficulty of Paying Living Expenses: Not on file Food Insecurity:     Worried About Running Out of Food in the Last Year: Not on file    Renae of Food in the Last Year: Not on file   Transportation Needs:     Lack of Transportation (Medical): Not on file    Lack of Transportation (Non-Medical): Not on file   Physical Activity:     Days of Exercise per Week: Not on file    Minutes of Exercise per Session: Not on file   Stress:     Feeling of Stress : Not on file   Social Connections:     Frequency of Communication with Friends and Family: Not on file    Frequency of Social Gatherings with Friends and Family: Not on file    Attends Yarsani Services: Not on file    Active Member of [address] [place] [address] [employer] or Organizations: Not on file    Attends Club or Organization Meetings: Not on file    Marital Status: Not on file   Intimate Partner Violence:     Fear of Current or Ex-Partner: Not on file    Emotionally Abused: Not on file    Physically Abused: Not on file    Sexually Abused: Not on file   Housing Stability:     Unable to Pay for Housing in the Last Year: Not on file    Number of [place] in the Last Year: Not on file    Unstable Housing in the Last Year: Not on file     No current facility-administered medications for this encounter. Current Outpatient Medications   Medication Sig Dispense Refill    dicyclomine (BENTYL) 20 MG tablet Take 1 tablet by mouth 3 times daily as needed (Abd cramps) 120 tablet 3    omeprazole (PRILOSEC) 20 MG delayed release capsule TAKE 1 CAPSULE BY MOUTH EVERY MORNING ON AN EMPTY STOMACH  1    oxyCODONE-acetaminophen (PERCOCET)  MG per tablet TAKE 1 TABLET BY MOUTH EVERY 6 HOURS AS NEEDED  30 DAYS  0    LYRICA 150 MG capsule 150 mg 2 times daily.        DULoxetine (CYMBALTA) 60 MG extended release capsule TAKE ONE CAPSULE BY MOUTH DAILY WITH 30MG  2    CHANTIX CONTINUING MONTH INGRID 1 MG tablet TAKE 1 TABLET BY MOUTH TWICE A DAY AFTER MEALS WITH A GLASS OF WATER  0    varenicline (CHANTIX) 1 MG tablet Chantix Continuing Month Box 1 mg tablet      ibuprofen (ADVIL;MOTRIN) 800 MG tablet Take 1 tablet by mouth every 6 hours as needed for Pain 30 tablet 0    oxybutynin (DITROPAN) 5 MG tablet Take by mouth 3 times daily      atorvastatin (LIPITOR) 20 MG tablet Take 20 mg by mouth daily      DULoxetine (CYMBALTA) 30 MG capsule Take 90 mg by mouth daily       baclofen (LIORESAL) 10 MG tablet Take 10 mg by mouth 2 times daily      estropipate (OGEN) 3 MG tablet Take 0.625 mg by mouth daily        Allergies   Allergen Reactions    Latex Other (See Comments)     Blister    blisters    Morphine Hives    Adhesive Tape Other (See Comments)     Blister    Aspirin Hives     Sometimes can tolerate    Codeine Hives       Nursing Notes Reviewed    Physical Exam:  Triage VS:    ED Triage Vitals [01/11/22 1006]   Enc Vitals Group      /87      Pulse 89      Resp 18      Temp 99.7 °F (37.6 °C)      Temp src       SpO2 100 %      Weight       Height       Head Circumference       Peak Flow       Pain Score       Pain Loc       Pain Edu? Excl. in [address]? Constitutional: Well developed, Well nourished, no acute distress  HENT: Normocephalic, Atraumatic, Bilateral external ears normal, Oropharynx moist, No oral exudates, Nose normal.   Eyes: PERRL, EOMI, Conjunctiva normal, No discharge. No scleral icterus. Neck: Normal range of motion, No tenderness, Supple. Lymphatic: No lymphadenopathy noted. Cardiovascular: Normal heart rate, Normal rhythm, No murmurs, gallops or rubs. Thorax & Lungs: Normal breath sounds, No respiratory distress, No wheezing. Abdomen: Soft, No tenderness, no rebound, no guarding, no masses, No pulsatile masses, No distention, Normal bowel sounds  Skin: Warm, Dry, Pink, No mottling, No erythema, No rash. Back: No CVA tenderness, tenderness to palpation of the lumbar spine, no step-offs or deformities noted, no overlying skin changes noted.   Extremities: No edema, No tenderness, No cyanosis, Normal perfusion, No clubbing. Musculoskeletal: Good range of motion in all major joints as observed. No major deformities noted. Neurologic: Alert & oriented x 3, Normal motor function, Normal sensory function, CN II-XII grossly intact as tested, No focal deficits noted. Gait intact.   Psychiatric: Affect normal, Judgment normal, Mood normal.     I have reviewed and interpreted all of the currently available lab results from this visit (if applicable):  Results for orders placed or performed during the hospital encounter of 01/11/22   COVID-19, Rapid    Specimen: Nasopharyngeal   Result Value Ref Range    Source THROAT     SARS-CoV-2, NAAT NOT DETECTED NOT DETECTED   Rapid Flu Swab    Specimen: Nasopharyngeal   Result Value Ref Range    Rapid Influenza A Ag NEGATIVE NEGATIVE    Rapid Influenza B Ag NEGATIVE NEGATIVE   Urinalysis (Lab)   Result Value Ref Range    Color, UA YELLOW (A) YELLOW    Clarity, UA CLEAR CLEAR    Glucose, Urine NEGATIVE NEGATIVE MG/DL    Bilirubin Urine NEGATIVE NEGATIVE MG/DL    Ketones, Urine NEGATIVE NEGATIVE MG/DL    Specific Gravity, UA 1.010 1.001 - 1.035    Blood, Urine MODERATE (A) NEGATIVE    pH, Urine 7.5 5.0 - 8.0    Protein, UA TRACE (A) NEGATIVE MG/DL    Urobilinogen, Urine NORMAL 0.2 - 1.0 MG/DL    Nitrite Urine, Quantitative NEGATIVE NEGATIVE    Leukocyte Esterase, Urine NEGATIVE NEGATIVE    RBC, UA 8 (H) 0 - 6 /HPF    WBC, UA 1 0 - 5 /HPF    Bacteria, UA RARE (A) NEGATIVE /HPF    Squam Epithel, UA 5 /HPF    Mucus, UA RARE (A) NEGATIVE HPF   HCG, Urine, Qualitative, Pregnancy (Lab)   Result Value Ref Range    Pregnancy, Urine NEGATIVE NEGATIVE    Specific Gravity, Urine 1.010 1.001 - 1.035    Interpretation HCG METHOD LIMITATIONS:    Basic Metabolic Panel w/ Reflex to MG   Result Value Ref Range    Sodium 140 135 - 145 MMOL/L    Potassium 3.8 3.5 - 5.1 MMOL/L    Chloride 102 99 - 110 mMol/L    CO2 26 21 - 32 MMOL/L    Anion Gap 12 4 - 16    BUN 6 6 - 23 MG/DL    CREATININE 0.5 (L) 0.6 - 1.1 MG/DL    Glucose 94 70 - 99 MG/DL    Calcium 9.0 8.3 - 10.6 MG/DL    GFR Non-African American >60 >60 mL/min/1.73m2    GFR African American >60 >60 mL/min/1.73m2   Lactate, Sepsis   Result Value Ref Range    Lactic Acid, Sepsis 1.2 0.5 - 1.9 mMOL/L   C-Reactive Protein   Result Value Ref Range    CRP, High Sensitivity 83.6 mg/L   Sedimentation Rate   Result Value Ref Range    Sed Rate 22 (H) 0 - 20 MM/HR      Radiographs (if obtained):  Radiologist's Report Reviewed:  XR CHEST (2 VW)    Result Date: 1/11/2022  EXAMINATION: TWO XRAY VIEWS OF THE CHEST 1/11/2022 1:34 pm COMPARISON: 10/20/2018. HISTORY: ORDERING SYSTEM PROVIDED HISTORY: ensure removal of spinal stimulator TECHNOLOGIST PROVIDED HISTORY: Reason for exam:->ensure removal of spinal stimulator Reason for Exam: ensure removal of spinal stimulator FINDINGS: Lungs are clear. Cardiac and mediastinal silhouettes are within normal limits. No pneumothoraces. Bony structures appear intact. No spinal stimulation device or leads are identified. No radiopaque foreign bodies are noted. Unremarkable exam.     XR ABDOMEN (2 VIEWS)    Result Date: 1/11/2022  EXAMINATION: TWO XRAY VIEWS OF THE ABDOMEN 1/11/2022 1:34 pm COMPARISON: CT abdomen and pelvis 11/23/2020. HISTORY: ORDERING SYSTEM PROVIDED HISTORY: ensure removal of spinal stimulator TECHNOLOGIST PROVIDED HISTORY: Reason for exam:->ensure removal of spinal stimulator Reason for Exam: ensure removal of spinal stimulator FINDINGS: Normal bowel gas pattern. No evidence for obstruction or intraperitoneal free air. No urinary tract calcifications are noted. Phleboliths within the pelvis bilaterally. No acute bony abnormality. Chronic postsurgical change from prior anterior fusion and discectomies at L3-L4, L4-L5 and L5-S1 redemonstrated. No spinal stimulator device or leads are identified. No radiopaque foreign bodies are noted. No acute abnormality.  No spinal stimulation device or leads identified. EKG (if obtained): (All EKG's are interpreted by myself in the absence of a cardiologist)      MDM:  Patient presents with complaints of body aches, low back pain and fevers. She also subjectively feels like her lower extremities are weaker than they have been. Has a history of recent pain pump removal from her lower back. Patient denies any IV drug use. She is afebrile here. No noticeable lower extremity weakness here but she states she feels like she has weakness when she walks. Denies any bowel or bladder incontinence, saddle anesthesia, urinary retention. Given the patient was having a cough and some generalized body aches this influenza and COVID screen were done that were unremarkable. Given her worsening lower back pain and recent procedure as well as fevers and MRIs of her spine were ordered. She will be signed out to oncoming physician for final disposition. If MRIs are unremarkable she will be able to be discharged home. She already has pain medicine specialist.    Clinical Impression:  No diagnosis found. Disposition referral (if applicable):  No follow-up provider specified. Disposition medications (if applicable):  New Prescriptions    No medications on file     ED Provider Disposition Time  DISPOSITION        Comment: Please note this report has been produced using speech recognition software and may contain errors related to that system including errors in grammar, punctuation, and spelling, as well as words and phrases that may be inappropriate. Efforts were made to edit the dictations.         Gordy Hilliard MD  01/11/22 9065

## 2022-01-11 NOTE — ED PROVIDER NOTES
As provider-in-triage, I performed a medical screening history and physical exam on this patient. HISTORY OF PRESENT ILLNESS  Frantz Crow is a 40 y.o. female who presents to the emergency department today with acute on chronic back pain and a fever. Also having upper back pain, generalized body aches. States that she had a \"trial of a pain stimulator\" by her pain management that was removed about 1 week ago. Is now having worsening pain into the back and upper shoulders. Has history of chronic pain, is on 10 mg of Percocet regularly. States has not been helping. Denying any alarming symptoms such as bowel bladder incontinence saddle anesthesia or radicular weakness. Has general upper and lower extremity neuropathy. No erythema or redness into the low back. Patient is vaccinated against COVID and influenza. Denying urinary symptoms. PHYSICAL EXAM  /87   Pulse 89   Temp 99.7 °F (37.6 °C)   Resp 18   SpO2 100%     On exam, the patient appears in no acute distress. Speech is clear. Breathing is unlabored. Moves all extremities    Comment: Please note this report has been produced using speech recognition software and may contain errors related to that system including errors in grammar, punctuation, and spelling, as well as words and phrases that may be inappropriate. If there are any questions or concerns please feel free to contact the dictating provider for clarification.         Melina Moreno 411, PA  01/11/22 1017

## 2022-01-12 NOTE — ED NOTES
RN attempted to discharge pt. Pt sts please check my temperature and my blood pressure. I feel hot and my head still hurts. I came here to get checked out and I don't feel any better. \"    Yosvany Rojas MD made aware and tylenol ordered. Scottie BURTON at bedside.       Smith Chowdary RN  01/11/22 6439

## 2022-01-12 NOTE — ED NOTES
Dr. Jackelyn Lou attempting to speak with pt- pt would not get off phone. MD notifies this RN that when pt is off the phone he will talk to her. This RN enters room and asks when pt will be able to be off phone because the doctor cannot talk to her when she is on the phone. Nurse exits room. Pt then calls this nurse back into room and states that the nurse was rude and she does not appreciate attitude because she is a paying patient. This RN informs the pt that we do not discriminate or look up whether pts are \"paying patients\". Pt reports she is just frustrated and has been in pain all day and knows no results. This RN informs pt of all pain medications she has received during that day and that the MD was in the room to go over results. RN exits when pt states just go you are not helping anyways. MD informed that pt is now off phone and frustrated.      Valerie Smith RN  01/11/22 2032

## 2022-01-16 LAB
CULTURE: NORMAL
Lab: NORMAL
SPECIMEN: NORMAL

## 2022-01-31 ENCOUNTER — HOSPITAL ENCOUNTER (OUTPATIENT)
Age: 47
Discharge: HOME | End: 2022-01-31
Payer: COMMERCIAL

## 2022-01-31 LAB
ALBUMIN SERPL-MCNC: 3.6 GM/DL (ref 3.4–5)
ALBUMIN SERPL-MCNC: 3.6 GM/DL (ref 3.4–5)
ALP BLD-CCNC: 137 IU/L (ref 40–129)
ALP BLD-CCNC: 137 IU/L (ref 40–129)
ALT SERPL-CCNC: 21 U/L (ref 10–40)
ALT SERPL-CCNC: 21 U/L (ref 10–40)
ANION GAP SERPL CALCULATED.3IONS-SCNC: 9 MMOL/L (ref 4–16)
AST SERPL-CCNC: 25 IU/L (ref 15–37)
AST SERPL-CCNC: 25 IU/L (ref 15–37)
BASOPHILS ABSOLUTE: 0 K/CU MM
BASOPHILS RELATIVE PERCENT: 0.4 % (ref 0–1)
BILIRUB SERPL-MCNC: 0.2 MG/DL (ref 0–1)
BILIRUB SERPL-MCNC: 0.2 MG/DL (ref 0–1)
BILIRUBIN DIRECT: 0.2 MG/DL (ref 0–0.3)
BILIRUBIN, INDIRECT: 0 MG/DL (ref 0–0.7)
BUN BLDV-MCNC: 9 MG/DL (ref 6–23)
CALCIUM SERPL-MCNC: 9.2 MG/DL (ref 8.3–10.6)
CHLORIDE BLD-SCNC: 102 MMOL/L (ref 99–110)
CO2: 31 MMOL/L (ref 21–32)
CREAT SERPL-MCNC: 0.7 MG/DL (ref 0.6–1.1)
DIFFERENTIAL TYPE: ABNORMAL
EOSINOPHILS ABSOLUTE: 0.1 K/CU MM
EOSINOPHILS RELATIVE PERCENT: 1.9 % (ref 0–3)
ERYTHROCYTE SEDIMENTATION RATE: 15 MM/HR (ref 0–20)
FOLATE: 16.4 NG/ML (ref 3.1–17.5)
GFR AFRICAN AMERICAN: >60 ML/MIN/1.73M2
GFR NON-AFRICAN AMERICAN: >60 ML/MIN/1.73M2
GLUCOSE BLD-MCNC: 95 MG/DL (ref 70–99)
HCT VFR BLD CALC: 37.8 % (ref 37–47)
HEMOGLOBIN: 11.8 GM/DL (ref 12.5–16)
IMMATURE NEUTROPHIL %: 0.4 % (ref 0–0.43)
LYMPHOCYTES ABSOLUTE: 2.2 K/CU MM
LYMPHOCYTES RELATIVE PERCENT: 42 % (ref 24–44)
MCH RBC QN AUTO: 30.3 PG (ref 27–31)
MCHC RBC AUTO-ENTMCNC: 31.2 % (ref 32–36)
MCV RBC AUTO: 97.2 FL (ref 78–100)
MONOCYTES ABSOLUTE: 0.6 K/CU MM
MONOCYTES RELATIVE PERCENT: 10.6 % (ref 0–4)
NUCLEATED RBC %: 0 %
PDW BLD-RTO: 11.9 % (ref 11.7–14.9)
PLATELET # BLD: 281 K/CU MM (ref 140–440)
PMV BLD AUTO: 10.3 FL (ref 7.5–11.1)
POTASSIUM SERPL-SCNC: 4.4 MMOL/L (ref 3.5–5.1)
RBC # BLD: 3.89 M/CU MM (ref 4.2–5.4)
SEGMENTED NEUTROPHILS ABSOLUTE COUNT: 2.4 K/CU MM
SEGMENTED NEUTROPHILS RELATIVE PERCENT: 44.7 % (ref 36–66)
SODIUM BLD-SCNC: 142 MMOL/L (ref 135–145)
TOTAL CK: 106 IU/L (ref 26–140)
TOTAL IMMATURE NEUTOROPHIL: 0.02 K/CU MM
TOTAL NUCLEATED RBC: 0 K/CU MM
TOTAL PROTEIN: 6.5 GM/DL (ref 6.4–8.2)
TOTAL PROTEIN: 6.5 GM/DL (ref 6.4–8.2)
TSH HIGH SENSITIVITY: 0.49 UIU/ML (ref 0.27–4.2)
VITAMIN B-12: 856.3 PG/ML (ref 211–911)
WBC # BLD: 5.3 K/CU MM (ref 4–10.5)

## 2022-01-31 PROCEDURE — 84165 PROTEIN E-PHORESIS SERUM: CPT

## 2022-01-31 PROCEDURE — 36415 COLL VENOUS BLD VENIPUNCTURE: CPT

## 2022-01-31 PROCEDURE — 84436 ASSAY OF TOTAL THYROXINE: CPT

## 2022-01-31 PROCEDURE — 82607 VITAMIN B-12: CPT

## 2022-01-31 PROCEDURE — 85652 RBC SED RATE AUTOMATED: CPT

## 2022-01-31 PROCEDURE — 80076 HEPATIC FUNCTION PANEL: CPT

## 2022-01-31 PROCEDURE — 85025 COMPLETE CBC W/AUTO DIFF WBC: CPT

## 2022-01-31 PROCEDURE — 84443 ASSAY THYROID STIM HORMONE: CPT

## 2022-01-31 PROCEDURE — 80053 COMPREHEN METABOLIC PANEL: CPT

## 2022-01-31 PROCEDURE — 82550 ASSAY OF CK (CPK): CPT

## 2022-01-31 PROCEDURE — 86038 ANTINUCLEAR ANTIBODIES: CPT

## 2022-01-31 PROCEDURE — 82746 ASSAY OF FOLIC ACID SERUM: CPT

## 2022-01-31 PROCEDURE — 84479 ASSAY OF THYROID (T3 OR T4): CPT

## 2022-02-02 LAB
ALBUMIN ELP: 3.2 GM/DL (ref 3.2–5.6)
ALPHA-1-GLOBULIN: 0.3 GM/DL (ref 0.1–0.4)
ALPHA-2-GLOBULIN: 0.8 GM/DL (ref 0.4–1.2)
BETA GLOBULIN: 1 GM/DL (ref 0.5–1.3)
GAMMA GLOBULIN: 1.2 GM/DL (ref 0.5–1.6)
SPEP INTERPRETATION: NORMAL
T4 TOTAL: 9.75 UG/DL (ref 5.1–14.1)
TOTAL PROTEIN: 6.5 GM/DL (ref 6.4–8.2)

## 2022-02-03 LAB
ANTI-NUCLEAR ANTIBODY (ANA): NORMAL
T3 UPTAKE PERCENT: 28 % (ref 28–41)

## 2022-04-01 ENCOUNTER — HOSPITAL ENCOUNTER (OUTPATIENT)
Age: 47
Setting detail: OBSERVATION
Discharge: HOME | End: 2022-04-04
Attending: INTERNAL MEDICINE | Admitting: INTERNAL MEDICINE
Payer: COMMERCIAL

## 2022-04-01 ENCOUNTER — APPOINTMENT (OUTPATIENT)
Dept: GENERAL RADIOLOGY | Age: 47
End: 2022-04-01
Payer: COMMERCIAL

## 2022-04-01 DIAGNOSIS — R07.9 CHEST PAIN, UNSPECIFIED TYPE: Primary | ICD-10-CM

## 2022-04-01 LAB
ALBUMIN SERPL-MCNC: 3.7 GM/DL (ref 3.4–5)
ALP BLD-CCNC: 98 IU/L (ref 40–129)
ALT SERPL-CCNC: 12 U/L (ref 10–40)
ANION GAP SERPL CALCULATED.3IONS-SCNC: 7 MMOL/L (ref 4–16)
AST SERPL-CCNC: 15 IU/L (ref 15–37)
BASOPHILS ABSOLUTE: 0 K/CU MM
BASOPHILS RELATIVE PERCENT: 0.3 % (ref 0–1)
BILIRUB SERPL-MCNC: 0.1 MG/DL (ref 0–1)
BUN BLDV-MCNC: 11 MG/DL (ref 6–23)
CALCIUM SERPL-MCNC: 8.6 MG/DL (ref 8.3–10.6)
CHLORIDE BLD-SCNC: 104 MMOL/L (ref 99–110)
CO2: 28 MMOL/L (ref 21–32)
CREAT SERPL-MCNC: 0.8 MG/DL (ref 0.6–1.1)
D DIMER: <200 NG/ML(DDU)
DIFFERENTIAL TYPE: ABNORMAL
EOSINOPHILS ABSOLUTE: 0.1 K/CU MM
EOSINOPHILS RELATIVE PERCENT: 2 % (ref 0–3)
GFR AFRICAN AMERICAN: >60 ML/MIN/1.73M2
GFR NON-AFRICAN AMERICAN: >60 ML/MIN/1.73M2
GLUCOSE BLD-MCNC: 104 MG/DL (ref 70–99)
HCT VFR BLD CALC: 35.7 % (ref 37–47)
HEMOGLOBIN: 11.5 GM/DL (ref 12.5–16)
IMMATURE NEUTROPHIL %: 0.2 % (ref 0–0.43)
INR BLD: 0.87 INDEX
LIPASE: 13 IU/L (ref 13–60)
LYMPHOCYTES ABSOLUTE: 3.2 K/CU MM
LYMPHOCYTES RELATIVE PERCENT: 47.9 % (ref 24–44)
MCH RBC QN AUTO: 30.7 PG (ref 27–31)
MCHC RBC AUTO-ENTMCNC: 32.2 % (ref 32–36)
MCV RBC AUTO: 95.5 FL (ref 78–100)
MONOCYTES ABSOLUTE: 0.5 K/CU MM
MONOCYTES RELATIVE PERCENT: 7.8 % (ref 0–4)
NUCLEATED RBC %: 0 %
PDW BLD-RTO: 12.7 % (ref 11.7–14.9)
PLATELET # BLD: 249 K/CU MM (ref 140–440)
PMV BLD AUTO: 9.3 FL (ref 7.5–11.1)
POTASSIUM SERPL-SCNC: 3.7 MMOL/L (ref 3.5–5.1)
PRO-BNP: 34.6 PG/ML
PROTHROMBIN TIME: 11.2 SECONDS (ref 11.7–14.5)
RBC # BLD: 3.74 M/CU MM (ref 4.2–5.4)
SEGMENTED NEUTROPHILS ABSOLUTE COUNT: 2.8 K/CU MM
SEGMENTED NEUTROPHILS RELATIVE PERCENT: 41.8 % (ref 36–66)
SODIUM BLD-SCNC: 139 MMOL/L (ref 135–145)
TOTAL IMMATURE NEUTOROPHIL: 0.01 K/CU MM
TOTAL NUCLEATED RBC: 0 K/CU MM
TOTAL PROTEIN: 6.7 GM/DL (ref 6.4–8.2)
TROPONIN T: <0.01 NG/ML
WBC # BLD: 6.7 K/CU MM (ref 4–10.5)

## 2022-04-01 PROCEDURE — 71045 X-RAY EXAM CHEST 1 VIEW: CPT

## 2022-04-01 PROCEDURE — 80053 COMPREHEN METABOLIC PANEL: CPT

## 2022-04-01 PROCEDURE — 84484 ASSAY OF TROPONIN QUANT: CPT

## 2022-04-01 PROCEDURE — 85610 PROTHROMBIN TIME: CPT

## 2022-04-01 PROCEDURE — 99285 EMERGENCY DEPT VISIT HI MDM: CPT

## 2022-04-01 PROCEDURE — 6370000000 HC RX 637 (ALT 250 FOR IP): Performed by: NURSE PRACTITIONER

## 2022-04-01 PROCEDURE — 2580000003 HC RX 258: Performed by: INTERNAL MEDICINE

## 2022-04-01 PROCEDURE — 85379 FIBRIN DEGRADATION QUANT: CPT

## 2022-04-01 PROCEDURE — 85025 COMPLETE CBC W/AUTO DIFF WBC: CPT

## 2022-04-01 PROCEDURE — 6370000000 HC RX 637 (ALT 250 FOR IP): Performed by: INTERNAL MEDICINE

## 2022-04-01 PROCEDURE — G0378 HOSPITAL OBSERVATION PER HR: HCPCS

## 2022-04-01 PROCEDURE — 36415 COLL VENOUS BLD VENIPUNCTURE: CPT

## 2022-04-01 PROCEDURE — 96375 TX/PRO/DX INJ NEW DRUG ADDON: CPT

## 2022-04-01 PROCEDURE — 83690 ASSAY OF LIPASE: CPT

## 2022-04-01 PROCEDURE — 96374 THER/PROPH/DIAG INJ IV PUSH: CPT

## 2022-04-01 PROCEDURE — 83880 ASSAY OF NATRIURETIC PEPTIDE: CPT

## 2022-04-01 PROCEDURE — 6360000002 HC RX W HCPCS: Performed by: PHYSICIAN ASSISTANT

## 2022-04-01 RX ORDER — DULOXETIN HYDROCHLORIDE 20 MG/1
20 CAPSULE, DELAYED RELEASE ORAL DAILY
Status: DISCONTINUED | OUTPATIENT
Start: 2022-04-01 | End: 2022-04-01 | Stop reason: SDUPTHER

## 2022-04-01 RX ORDER — SODIUM CHLORIDE 0.9 % (FLUSH) 0.9 %
5-40 SYRINGE (ML) INJECTION PRN
Status: DISCONTINUED | OUTPATIENT
Start: 2022-04-01 | End: 2022-04-04 | Stop reason: HOSPADM

## 2022-04-01 RX ORDER — NICOTINE 21 MG/24HR
1 PATCH, TRANSDERMAL 24 HOURS TRANSDERMAL DAILY
Status: DISCONTINUED | OUTPATIENT
Start: 2022-04-02 | End: 2022-04-04 | Stop reason: HOSPADM

## 2022-04-01 RX ORDER — OXYCODONE HYDROCHLORIDE AND ACETAMINOPHEN 5; 325 MG/1; MG/1
1 TABLET ORAL EVERY 6 HOURS PRN
Status: DISCONTINUED | OUTPATIENT
Start: 2022-04-01 | End: 2022-04-04 | Stop reason: HOSPADM

## 2022-04-01 RX ORDER — ACETAMINOPHEN 325 MG/1
650 TABLET ORAL EVERY 6 HOURS PRN
Status: DISCONTINUED | OUTPATIENT
Start: 2022-04-01 | End: 2022-04-04 | Stop reason: HOSPADM

## 2022-04-01 RX ORDER — PANTOPRAZOLE SODIUM 40 MG/1
40 TABLET, DELAYED RELEASE ORAL
Status: DISCONTINUED | OUTPATIENT
Start: 2022-04-02 | End: 2022-04-04 | Stop reason: HOSPADM

## 2022-04-01 RX ORDER — ATORVASTATIN CALCIUM 20 MG/1
20 TABLET, FILM COATED ORAL NIGHTLY
Status: DISCONTINUED | OUTPATIENT
Start: 2022-04-01 | End: 2022-04-04 | Stop reason: HOSPADM

## 2022-04-01 RX ORDER — GABAPENTIN 300 MG/1
600 CAPSULE ORAL 2 TIMES DAILY
Status: DISCONTINUED | OUTPATIENT
Start: 2022-04-01 | End: 2022-04-04 | Stop reason: HOSPADM

## 2022-04-01 RX ORDER — DULOXETIN HYDROCHLORIDE 30 MG/1
90 CAPSULE, DELAYED RELEASE ORAL DAILY
Status: DISCONTINUED | OUTPATIENT
Start: 2022-04-02 | End: 2022-04-04 | Stop reason: HOSPADM

## 2022-04-01 RX ORDER — ONDANSETRON 2 MG/ML
4 INJECTION INTRAMUSCULAR; INTRAVENOUS EVERY 6 HOURS PRN
Status: DISCONTINUED | OUTPATIENT
Start: 2022-04-01 | End: 2022-04-04 | Stop reason: HOSPADM

## 2022-04-01 RX ORDER — SODIUM CHLORIDE 0.9 % (FLUSH) 0.9 %
5-40 SYRINGE (ML) INJECTION EVERY 12 HOURS SCHEDULED
Status: DISCONTINUED | OUTPATIENT
Start: 2022-04-01 | End: 2022-04-04 | Stop reason: HOSPADM

## 2022-04-01 RX ORDER — BACLOFEN 10 MG/1
10 TABLET ORAL 2 TIMES DAILY
Status: DISCONTINUED | OUTPATIENT
Start: 2022-04-01 | End: 2022-04-04 | Stop reason: HOSPADM

## 2022-04-01 RX ORDER — ACETAMINOPHEN 650 MG/1
650 SUPPOSITORY RECTAL EVERY 6 HOURS PRN
Status: DISCONTINUED | OUTPATIENT
Start: 2022-04-01 | End: 2022-04-04 | Stop reason: HOSPADM

## 2022-04-01 RX ORDER — POTASSIUM CHLORIDE 7.45 MG/ML
10 INJECTION INTRAVENOUS PRN
Status: DISCONTINUED | OUTPATIENT
Start: 2022-04-01 | End: 2022-04-04 | Stop reason: HOSPADM

## 2022-04-01 RX ORDER — POLYETHYLENE GLYCOL 3350 17 G/17G
17 POWDER, FOR SOLUTION ORAL DAILY PRN
Status: DISCONTINUED | OUTPATIENT
Start: 2022-04-01 | End: 2022-04-04 | Stop reason: HOSPADM

## 2022-04-01 RX ORDER — PREGABALIN 75 MG/1
150 CAPSULE ORAL 2 TIMES DAILY
Status: DISCONTINUED | OUTPATIENT
Start: 2022-04-01 | End: 2022-04-01

## 2022-04-01 RX ORDER — ATORVASTATIN CALCIUM 40 MG/1
80 TABLET, FILM COATED ORAL NIGHTLY
Status: DISCONTINUED | OUTPATIENT
Start: 2022-04-01 | End: 2022-04-01 | Stop reason: SDUPTHER

## 2022-04-01 RX ORDER — POTASSIUM CHLORIDE 20 MEQ/1
40 TABLET, EXTENDED RELEASE ORAL PRN
Status: DISCONTINUED | OUTPATIENT
Start: 2022-04-01 | End: 2022-04-04 | Stop reason: HOSPADM

## 2022-04-01 RX ORDER — ONDANSETRON 4 MG/1
4 TABLET, ORALLY DISINTEGRATING ORAL EVERY 8 HOURS PRN
Status: DISCONTINUED | OUTPATIENT
Start: 2022-04-01 | End: 2022-04-04 | Stop reason: HOSPADM

## 2022-04-01 RX ORDER — OXYCODONE AND ACETAMINOPHEN 10; 325 MG/1; MG/1
1 TABLET ORAL EVERY 6 HOURS PRN
Status: DISCONTINUED | OUTPATIENT
Start: 2022-04-01 | End: 2022-04-01 | Stop reason: CLARIF

## 2022-04-01 RX ORDER — FENTANYL CITRATE 50 UG/ML
25 INJECTION, SOLUTION INTRAMUSCULAR; INTRAVENOUS ONCE
Status: COMPLETED | OUTPATIENT
Start: 2022-04-01 | End: 2022-04-01

## 2022-04-01 RX ORDER — ASPIRIN 81 MG/1
81 TABLET, CHEWABLE ORAL DAILY
Status: DISCONTINUED | OUTPATIENT
Start: 2022-04-02 | End: 2022-04-04 | Stop reason: HOSPADM

## 2022-04-01 RX ORDER — OXYBUTYNIN CHLORIDE 5 MG/1
5 TABLET ORAL 3 TIMES DAILY
Status: DISCONTINUED | OUTPATIENT
Start: 2022-04-01 | End: 2022-04-01

## 2022-04-01 RX ORDER — SODIUM CHLORIDE 9 MG/ML
INJECTION, SOLUTION INTRAVENOUS PRN
Status: DISCONTINUED | OUTPATIENT
Start: 2022-04-01 | End: 2022-04-04 | Stop reason: HOSPADM

## 2022-04-01 RX ORDER — OXYCODONE HYDROCHLORIDE 5 MG/1
5 TABLET ORAL EVERY 6 HOURS PRN
Status: DISCONTINUED | OUTPATIENT
Start: 2022-04-01 | End: 2022-04-04 | Stop reason: HOSPADM

## 2022-04-01 RX ORDER — DICYCLOMINE HCL 20 MG
20 TABLET ORAL 3 TIMES DAILY PRN
Status: DISCONTINUED | OUTPATIENT
Start: 2022-04-01 | End: 2022-04-04 | Stop reason: HOSPADM

## 2022-04-01 RX ADMIN — BACLOFEN 10 MG: 10 TABLET ORAL at 20:00

## 2022-04-01 RX ADMIN — OXYCODONE AND ACETAMINOPHEN 1 TABLET: 5; 325 TABLET ORAL at 22:21

## 2022-04-01 RX ADMIN — GABAPENTIN 600 MG: 300 CAPSULE ORAL at 20:00

## 2022-04-01 RX ADMIN — OXYCODONE HYDROCHLORIDE 5 MG: 5 TABLET ORAL at 20:00

## 2022-04-01 RX ADMIN — ATORVASTATIN CALCIUM 20 MG: 20 TABLET, FILM COATED ORAL at 20:00

## 2022-04-01 RX ADMIN — SODIUM CHLORIDE, PRESERVATIVE FREE 10 ML: 5 INJECTION INTRAVENOUS at 22:00

## 2022-04-01 RX ADMIN — ONDANSETRON 4 MG: 2 INJECTION INTRAMUSCULAR; INTRAVENOUS at 15:13

## 2022-04-01 RX ADMIN — FENTANYL CITRATE 25 MCG: 50 INJECTION, SOLUTION INTRAMUSCULAR; INTRAVENOUS at 15:34

## 2022-04-01 ASSESSMENT — PAIN SCALES - GENERAL
PAINLEVEL_OUTOF10: 7
PAINLEVEL_OUTOF10: 0
PAINLEVEL_OUTOF10: 8
PAINLEVEL_OUTOF10: 8
PAINLEVEL_OUTOF10: 10

## 2022-04-01 ASSESSMENT — PAIN DESCRIPTION - LOCATION
LOCATION: CHEST;ARM
LOCATION: CHEST;ARM

## 2022-04-01 ASSESSMENT — PAIN DESCRIPTION - DESCRIPTORS
DESCRIPTORS: ACHING;DULL
DESCRIPTORS: ACHING;DULL

## 2022-04-01 ASSESSMENT — PAIN DESCRIPTION - ORIENTATION
ORIENTATION: LEFT
ORIENTATION: LEFT

## 2022-04-01 NOTE — ACP (ADVANCE CARE PLANNING)
Patient does not have a current ACP document/Medical POA on file at this time. Per  [place] Next of [address] will be found n the following descending order for priority:    Guardian  Spouse  [de-identified] of adult Children  Parents  [de-identified] of adult Siblings  Nearest Relative not described above    Patients' Spouse will be Primary Healthcare Decision Maker.

## 2022-04-01 NOTE — ED NOTES
Pt went outside without notifying staff to smoke cigarette. Pt was assisted back to her room.  Troponin drawn at this time     Perry Collins RN  04/01/22 1746

## 2022-04-01 NOTE — ED PROVIDER NOTES
Patient Identification  Kristal Majano is a 40 y.o. female    Chief Complaint  Chest Pain      HPI  (History provided by Patient)  This is a 40 y.o. female who was brought in by the a chief complaint of chest pain has been ongoing since 1030 this morning. Patient reports she has substernal chest pain with radiation to her left arm, started up at 1030 this morning woke her up out of sleep. Describes it as a squeezing sensation pressure, radiation to left arm which is dull and achy and constant. Denies anything that makes it better, rated with exertion as well as inspiration expiration. Has had some nausea denies diaphoresis. Denies a history of coronary artery disease, family history of acute myocardial infarction. She is a smoker and has a family history of hyperlipidemia. Denies history of DVT or PE. Denies estrogen replacement therapy. Calf pain recent surgery or immobilization. REVIEW OF SYSTEMS    Constitutional:  Denies fever, chills  HENT:  Denies sore throat or ear pain   Eyes: Denies vision changes, eye pain  Cardiovascular:  Deniessyncope  Respiratory:  Denies shortness of breath, cough   GI:  Denies abdominal pain, vomiting  :  Denies dysuria, discharge  Musculoskeletal:  Denies back pain, joint pain  Skin:  Denies rash, pruritis  Neurologic:  Denies headache, focal weakness, or sensory changes     See HPI and nursing notes for additional information     I have reviewed the following nursing documentation:  Allergies: Allergies   Allergen Reactions    Latex Other (See Comments)     Blister    blisters    Morphine Hives    Adhesive Tape Other (See Comments)     Blister    Aspirin Hives     Sometimes can tolerate    Codeine Hives       Past medical history:  has a past medical history of Anemia, Anxiety, Arthritis, Bursitis, Chronic back pain, Incisional hernia, without obstruction or gangrene (9/23/2016), Migraines, and Psoriasis.     Past surgical history:  has a past surgical history that includes Tubal ligation (); shoulder surgery (Right, 2008);  section; [place] tooth extraction (); pelvic laparoscopy; Endometrial ablation; back surgery (13); Hysterectomy; Tonsillectomy; and ventral hernia repair (2016). Home medications:   Prior to Admission medications    Medication Sig Start Date End Date Taking? Authorizing Provider   lidocaine (LIDODERM) 5 % Place 1 patch onto the skin daily 12 hours on, 12 hours off. 22   Almer Anis, DO   dicyclomine (BENTYL) 20 MG tablet Take 1 tablet by mouth 3 times daily as needed (Abd cramps) 20   Evi Mack, DO   omeprazole (PRILOSEC) 20 MG delayed release capsule TAKE 1 CAPSULE BY MOUTH EVERY MORNING ON AN EMPTY STOMACH 3/12/19   Historical Provider, MD   oxyCODONE-acetaminophen (PERCOCET)  MG per tablet TAKE 1 TABLET BY MOUTH EVERY 6 HOURS AS NEEDED  30 DAYS 19   Historical Provider, MD   LYRICA 150 MG capsule 150 mg 2 times daily.   3/28/19   Historical Provider, MD   DULoxetine (CYMBALTA) 60 MG extended release capsule TAKE ONE CAPSULE BY MOUTH DAILY WITH 30MG 3/28/19   Historical Provider, MD   CHANTIX CONTINUING MONTH INGRID 1 MG tablet TAKE 1 TABLET BY MOUTH TWICE A DAY AFTER MEALS WITH A GLASS OF WATER 3/13/19   Historical Provider, MD   varenicline (CHANTIX) 1 MG tablet Chantix Continuing Month Box 1 mg tablet    Historical Provider, MD   ibuprofen (ADVIL;MOTRIN) 800 MG tablet Take 1 tablet by mouth every 6 hours as needed for Pain 4/15/19   Lili Whitman PA-C   oxybutynin (DITROPAN) 5 MG tablet Take by mouth 3 times daily    Historical Provider, MD   atorvastatin (LIPITOR) 20 MG tablet Take 20 mg by mouth daily    Historical Provider, MD   DULoxetine (CYMBALTA) 30 MG capsule Take 90 mg by mouth daily     Historical Provider, MD   baclofen (LIORESAL) 10 MG tablet Take 10 mg by mouth 2 times daily    Historical Provider, MD   estropipate (OGEN) 3 MG tablet Take 0.625 mg by mouth daily Historical Provider, MD       Social history:  reports that she has been smoking cigarettes. She started smoking about 35 years ago. She has a 23.25 pack-year smoking history. She has never used smokeless tobacco. She reports that she does not drink alcohol and does not use drugs. Family history:    Family History   Problem Relation Age of Onset    Cancer Mother         pancreatic and ovarian ca    High Blood Pressure Mother     Diabetes Mother     Asthma Mother     Heart Disease Brother         born with hole in heart , heart murmur     Exam  /82   Pulse 93   Temp 98.5 °F (36.9 °C)   Resp 20   SpO2 99%   Nursing note and vitals reviewed. Constitutional: Well developed, well nourished. No acute distress. HENT:      Head: Normocephalic and atraumatic. Ears: External ears normal.      Nose: Nose normal.     Mouth: Membrane mucosa moist and pink. No posterior oropharynx erythema or tonsillar edema  Eyes: Anicteric sclera. No discharge, PERRL  Neck: Supple. Trachea midline. Cardiovascular: RRR, no murmurs, rubs, or gallops, radial pulses 2+ bilaterally. Pulmonary/Chest: Effort normal. No respiratory distress. CTAB. No stridor. No wheezes. No rales. Abdominal: Soft. Nontender to palpation. No distension. No guarding, rebound tenderness, or evidence of ascites. Musculoskeletal: Moves all extremities. No gross deformity. Neurological: Alert and oriented to person, place, and time. Normal muscle tone. Skin: Warm and dry. No rash. Psychiatric: Normal mood and affect. Behavior is normal.      EKG   Please see Dr. Feng Bonilla note for EKG read. Radiographs (if obtained):  [] The following radiograph was interpreted by myself in the absence of a radiologist:   [x] Radiologist's Report Reviewed:  XR CHEST PORTABLE   Final Result   No acute cardiopulmonary disease on the low volume portable study.               Labs  Results for orders placed or performed during the hospital encounter of 04/01/22   CBC with Auto Differential   Result Value Ref Range    WBC 6.7 4.0 - 10.5 K/CU MM    RBC 3.74 (L) 4.2 - 5.4 M/CU MM    Hemoglobin 11.5 (L) 12.5 - 16.0 GM/DL    Hematocrit 35.7 (L) 37 - 47 %    MCV 95.5 78 - 100 FL    MCH 30.7 27 - 31 PG    MCHC 32.2 32.0 - 36.0 %    RDW 12.7 11.7 - 14.9 %    Platelets 555 506 - 919 K/CU MM    MPV 9.3 7.5 - 11.1 FL    Differential Type AUTOMATED DIFFERENTIAL     Segs Relative 41.8 36 - 66 %    Lymphocytes % 47.9 (H) 24 - 44 %    Monocytes % 7.8 (H) 0 - 4 %    Eosinophils % 2.0 0 - 3 %    Basophils % 0.3 0 - 1 %    Segs Absolute 2.8 K/CU MM    Lymphocytes Absolute 3.2 K/CU MM    Monocytes Absolute 0.5 K/CU MM    Eosinophils Absolute 0.1 K/CU MM    Basophils Absolute 0.0 K/CU MM    Nucleated RBC % 0.0 %    Total Nucleated RBC 0.0 K/CU MM    Total Immature Neutrophil 0.01 K/CU MM    Immature Neutrophil % 0.2 0 - 0.43 %   Comprehensive Metabolic Panel w/ Reflex to MG   Result Value Ref Range    Sodium 139 135 - 145 MMOL/L    Potassium 3.7 3.5 - 5.1 MMOL/L    Chloride 104 99 - 110 mMol/L    CO2 28 21 - 32 MMOL/L    BUN 11 6 - 23 MG/DL    CREATININE 0.8 0.6 - 1.1 MG/DL    Glucose 104 (H) 70 - 99 MG/DL    Calcium 8.6 8.3 - 10.6 MG/DL    Albumin 3.7 3.4 - 5.0 GM/DL    Total Protein 6.7 6.4 - 8.2 GM/DL    Total Bilirubin 0.1 0.0 - 1.0 MG/DL    ALT 12 10 - 40 U/L    AST 15 15 - 37 IU/L    Alkaline Phosphatase 98 40 - 129 IU/L    GFR Non-African American >60 >60 mL/min/1.73m2    GFR African American >60 >60 mL/min/1.73m2    Anion Gap 7 4 - 16   Troponin   Result Value Ref Range    Troponin T <0.010 <0.01 NG/ML   D-Dimer, Quantitative   Result Value Ref Range    D-Dimer, Quant <200 <230 NG/mL(DDU)   Protime-INR   Result Value Ref Range    Protime 11.2 (L) 11.7 - 14.5 SECONDS    INR 0.87 INDEX   Brain Natriuretic Peptide   Result Value Ref Range    Pro-BNP 34.60 <300 PG/ML   Lipase   Result Value Ref Range    Lipase 13 13 - 60 IU/L         MDM  See above for more information. Hemodynamically stable patient presents emergency department substernal chest pain with radiation to her left shoulder. Has 2 risk factors for ACS. Physical examination unremarkable, no focal neurologic deficits, no vascular deficits, no sensory deficits. No murmur rubs or gallops. Lungs are clear to auscultation. Initial EKG remarkable for left ventricular hypertrophy otherwise no evidence of infarct or ischemia. Initial troponin is negative rest of patient's lab work are negative including a BNP and a D-dimer with a low risk Wells. At this time is suspect that patient has angina have a concern, she has a heart score of 4. Will be admitting patient for further evaluation of ACS. Spoke with Dr. Lobo Bond discussed all germane and pertinent information regarding patient's care. Patient will be admitted to the hospitalist service for ES evaluation. At this time I do not suspect pulmonary embolism, D-dimer was negative with a low risk Wells. Do not suspect thoracic aortic dissection there are no neurovascular deficits on physical examination. No radiation of pain to her back. I do not suspect that patient has pancreatitis as her lipase is normal abdomen is soft and nontender. I do not suspect this is a pneumothorax. Normal chest x-ray no respiratory distress. Final Impression  1. Chest pain, unspecified type        Blood pressure 132/82, pulse 93, temperature 98.5 °F (36.9 °C), resp. rate 20, SpO2 99 %, not currently breastfeeding. Disposition:  Admit stable condition        Patient was given scripts for the following medications. I counseled patient how to take these medications. New Prescriptions    No medications on file       This chart was generated using the [address] [place] [address] [employer] system. I created this record but it may contain dictation errors given the limitations of this technology.      Luís Hilton PA-C  04/01/22 [id number]

## 2022-04-01 NOTE — ED NOTES
Report given to [address] [place] [address] for room 5120 and denies further questions     Oscar Lundborg, RN  04/01/22 [id number]

## 2022-04-01 NOTE — ED PROVIDER NOTES
EKG Interpretation    Interpreted by emergency department physician    Rhythm: normal sinus   Rate: normal  Axis: left  Ectopy: none  Conduction: normal  ST Segments: normal  T Waves: normal  Q Waves: none    Clinical Impression: Normal sinus rhythm with left ventricular hypertrophy    MD Shashank Eric MD  04/01/22 [id number]

## 2022-04-01 NOTE — ED NOTES
Pt is ambulating in the room unhooked from the monitor. This nurse educated about the need to be hooked up to the monitor. Then pt wants to walk in the hallway, this nurse educated that she cannot do that for the privacy and safety purposes. In few minutes, the patient is seen out in the hallway walking and talking and on the phone.      Wil Ordaz RN  04/01/22 [id number]

## 2022-04-01 NOTE — CARE COORDINATION
Patient does not have a current ACP document/Medical POA on file at this time. Per  [place]'s Next of [address] will be found n the following descending order for priority:    Guardian  Spouse  [de-identified] of adult Children  Parents  [de-identified] of adult Siblings  Nearest Relative not described above    Patients' spouse will be Primary Healthcare Decision Maker.

## 2022-04-01 NOTE — ED NOTES
Pharmacy called to let this nurse know that pt has not been taking ditrioan since 2019. Pt doesn't take lyrica but instead takes gabapentin BID. Dr. Ashanti Avila paged and updated.      Jessica Lomax, LAURIE  04/01/22 [place] Amena Augustin RN  04/01/22 [id number]

## 2022-04-02 ENCOUNTER — APPOINTMENT (OUTPATIENT)
Dept: CT IMAGING | Age: 47
End: 2022-04-02
Payer: COMMERCIAL

## 2022-04-02 LAB
AMPHETAMINES: NEGATIVE
ANION GAP SERPL CALCULATED.3IONS-SCNC: 9 MMOL/L (ref 4–16)
BARBITURATE SCREEN URINE: NEGATIVE
BENZODIAZEPINE SCREEN, URINE: NEGATIVE
BUN BLDV-MCNC: 8 MG/DL (ref 6–23)
CALCIUM SERPL-MCNC: 8.7 MG/DL (ref 8.3–10.6)
CANNABINOID SCREEN URINE: NEGATIVE
CHLORIDE BLD-SCNC: 105 MMOL/L (ref 99–110)
CO2: 30 MMOL/L (ref 21–32)
COCAINE METABOLITE: NEGATIVE
CREAT SERPL-MCNC: 0.7 MG/DL (ref 0.6–1.1)
GFR AFRICAN AMERICAN: >60 ML/MIN/1.73M2
GFR NON-AFRICAN AMERICAN: >60 ML/MIN/1.73M2
GLUCOSE BLD-MCNC: 112 MG/DL (ref 70–99)
HCT VFR BLD CALC: 36.4 % (ref 37–47)
HEMOGLOBIN: 11.5 GM/DL (ref 12.5–16)
MCH RBC QN AUTO: 30.5 PG (ref 27–31)
MCHC RBC AUTO-ENTMCNC: 31.6 % (ref 32–36)
MCV RBC AUTO: 96.6 FL (ref 78–100)
OPIATES, URINE: NEGATIVE
OXYCODONE: ABNORMAL
PDW BLD-RTO: 12.6 % (ref 11.7–14.9)
PHENCYCLIDINE, URINE: NEGATIVE
PLATELET # BLD: 254 K/CU MM (ref 140–440)
PMV BLD AUTO: 9.3 FL (ref 7.5–11.1)
POTASSIUM SERPL-SCNC: 4.2 MMOL/L (ref 3.5–5.1)
RBC # BLD: 3.77 M/CU MM (ref 4.2–5.4)
SODIUM BLD-SCNC: 144 MMOL/L (ref 135–145)
WBC # BLD: 6.1 K/CU MM (ref 4–10.5)

## 2022-04-02 PROCEDURE — 99204 OFFICE O/P NEW MOD 45 MIN: CPT | Performed by: INTERNAL MEDICINE

## 2022-04-02 PROCEDURE — G0378 HOSPITAL OBSERVATION PER HR: HCPCS

## 2022-04-02 PROCEDURE — 93005 ELECTROCARDIOGRAM TRACING: CPT | Performed by: INTERNAL MEDICINE

## 2022-04-02 PROCEDURE — 6370000000 HC RX 637 (ALT 250 FOR IP): Performed by: NURSE PRACTITIONER

## 2022-04-02 PROCEDURE — 2580000003 HC RX 258: Performed by: INTERNAL MEDICINE

## 2022-04-02 PROCEDURE — 85027 COMPLETE CBC AUTOMATED: CPT

## 2022-04-02 PROCEDURE — 6370000000 HC RX 637 (ALT 250 FOR IP): Performed by: INTERNAL MEDICINE

## 2022-04-02 PROCEDURE — 71275 CT ANGIOGRAPHY CHEST: CPT

## 2022-04-02 PROCEDURE — 6360000004 HC RX CONTRAST MEDICATION: Performed by: NURSE PRACTITIONER

## 2022-04-02 PROCEDURE — 80048 BASIC METABOLIC PNL TOTAL CA: CPT

## 2022-04-02 PROCEDURE — 36415 COLL VENOUS BLD VENIPUNCTURE: CPT

## 2022-04-02 PROCEDURE — 80307 DRUG TEST PRSMV CHEM ANLYZR: CPT

## 2022-04-02 PROCEDURE — 94761 N-INVAS EAR/PLS OXIMETRY MLT: CPT

## 2022-04-02 RX ADMIN — SODIUM CHLORIDE, PRESERVATIVE FREE 10 ML: 5 INJECTION INTRAVENOUS at 09:40

## 2022-04-02 RX ADMIN — OXYCODONE HYDROCHLORIDE 5 MG: 5 TABLET ORAL at 18:01

## 2022-04-02 RX ADMIN — DULOXETINE HYDROCHLORIDE 90 MG: 30 CAPSULE, DELAYED RELEASE ORAL at 09:37

## 2022-04-02 RX ADMIN — PANTOPRAZOLE SODIUM 40 MG: 40 TABLET, DELAYED RELEASE ORAL at 06:08

## 2022-04-02 RX ADMIN — ATORVASTATIN CALCIUM 20 MG: 20 TABLET, FILM COATED ORAL at 20:53

## 2022-04-02 RX ADMIN — OXYCODONE HYDROCHLORIDE 5 MG: 5 TABLET ORAL at 06:08

## 2022-04-02 RX ADMIN — IOPAMIDOL 90 ML: 755 INJECTION, SOLUTION INTRAVENOUS at 10:30

## 2022-04-02 RX ADMIN — SODIUM CHLORIDE, PRESERVATIVE FREE 10 ML: 5 INJECTION INTRAVENOUS at 20:55

## 2022-04-02 RX ADMIN — BACLOFEN 10 MG: 10 TABLET ORAL at 20:53

## 2022-04-02 RX ADMIN — GABAPENTIN 600 MG: 300 CAPSULE ORAL at 20:53

## 2022-04-02 RX ADMIN — GABAPENTIN 600 MG: 300 CAPSULE ORAL at 09:37

## 2022-04-02 RX ADMIN — OXYCODONE AND ACETAMINOPHEN 1 TABLET: 5; 325 TABLET ORAL at 20:58

## 2022-04-02 RX ADMIN — BACLOFEN 10 MG: 10 TABLET ORAL at 09:38

## 2022-04-02 RX ADMIN — ASPIRIN 81 MG 81 MG: 81 TABLET ORAL at 09:38

## 2022-04-02 RX ADMIN — OXYCODONE HYDROCHLORIDE 5 MG: 5 TABLET ORAL at 12:06

## 2022-04-02 ASSESSMENT — PAIN SCALES - GENERAL
PAINLEVEL_OUTOF10: 7
PAINLEVEL_OUTOF10: 9
PAINLEVEL_OUTOF10: 10
PAINLEVEL_OUTOF10: 10
PAINLEVEL_OUTOF10: 7
PAINLEVEL_OUTOF10: 10
PAINLEVEL_OUTOF10: 6
PAINLEVEL_OUTOF10: 5
PAINLEVEL_OUTOF10: 7
PAINLEVEL_OUTOF10: 9

## 2022-04-02 ASSESSMENT — PAIN DESCRIPTION - DESCRIPTORS: DESCRIPTORS: ACHING;DULL

## 2022-04-02 ASSESSMENT — PAIN DESCRIPTION - LOCATION: LOCATION: CHEST;ARM

## 2022-04-02 ASSESSMENT — PAIN DESCRIPTION - ORIENTATION: ORIENTATION: LEFT

## 2022-04-02 NOTE — H&P
History and Physical      Name:  Nate Trinidad /Age/Sex: 1975  (40 y.o. female)   MRN & CSN:  [id number] & [id number] Admission Date/Time: 2022  2:07 PM   Location:  [facility] PCP: Carin Berman MD, MD       Hospital Day: 1    Assessment and Plan:   Nate Trinidad is a 40 y.o.  female  who presents with Chest pain    Chest pain, trend troponins to rule out acute coronary syndrome. Repeat EKG. Echocardiogram in the morning in view of bilateral lower extremity swelling  Chronic pain, resume patient's Cymbalta and Neurontin. Avoid narcotic analgesia in this patient  GERD, PPI  Daily smoker, smoke cessation counseling. Nicotine patch. Diet ADULT DIET; Clear Liquid; No Caffeine   DVT Prophylaxis [x] Lovenox, []  Heparin, [] SCDs, [] Ambulation   GI Prophylaxis [x] PPI,  [] H2 Blocker,  [] Carafate,  [] Diet/Tube Feeds   Code Status Full Code   Disposition Patient requires continued admission due to need for further evaluation and treatment. MDM [] Low, [x] Moderate,[]  High       History of Present Illness:     Chief Complaint: Chest pain  Nate Trinidad is a 40 y.o.  female  who has a history of chronic pain, daily smoking, presents with left-sided chest pain, that started 2 days prior to this presentation and has progressively worsened. Pain is sharp. Nonradiating. No diaphoresis. Patient states the pain is mostly positional.  Worse with certain movements. Not necessarily worse with exertion. It is pleuritic. Associated with a dry cough. She has never had a cardiac work-up. She does not have any cardiac family history. Ten point ROS reviewed negative, unless as noted above    Objective:   No intake or output data in the 24 hours ending 22   Vitals:   Vitals:    22 1938   BP: 127/63   Pulse: 84   Resp: 16   Temp: 98.2 °F (36.8 °C)   SpO2: 98%     Physical Exam:   GEN Awake female, sitting upright in bed in no apparent distress.  Appears given age.  Jayy Ruiz are equally round. No scleral erythema, discharge, or conjunctivitis. HENT Mucous membranes are moist. Oral pharynx without exudates, no evidence of thrush. NECK Supple, no apparent thyromegaly or masses. RESP diminished air entry bilaterally. Occasional wheezes. CARDIO/VASC S1/S2 auscultated. Regular rate without appreciable murmurs, rubs, or gallops. No JVD or carotid bruits. Peripheral pulses equal bilaterally and palpable. No peripheral edema. GI Abdomen is soft without significant tenderness, masses, or guarding. Bowel sounds are normoactive. Rectal exam deferred.  No costovertebral angle tenderness. Normal appearing external genitalia. Fajardo catheter is not present. HEME/LYMPH No palpable cervical lymphadenopathy and no hepatosplenomegaly. No petechiae or ecchymoses. MSK No gross joint deformities. SKIN Normal coloration, warm, dry. NEURO Cranial nerves appear grossly intact, normal speech, no lateralizing weakness. PSYCH Awake, alert, oriented x 4. Affect appropriate. Past Medical History:      Past Medical History:   Diagnosis Date    Anemia     Anxiety     Arthritis     Bursitis     Chronic back pain     \"pain since 2006\"\"have sciatica    Incisional hernia, without obstruction or gangrene 2016    Migraines     infrequent    Psoriasis      PSHX:  has a past surgical history that includes Tubal ligation (); shoulder surgery (Right, 2008);  section; [place] tooth extraction (s); pelvic laparoscopy; Endometrial ablation; back surgery (13); Hysterectomy; Tonsillectomy; and ventral hernia repair (2016). Allergies:    Allergies   Allergen Reactions    Latex Other (See Comments)     Blister    blisters    Morphine Hives    Adhesive Tape Other (See Comments)     Blister    Aspirin Hives     Sometimes can tolerate    Codeine Hives       FAM HX: family history includes Asthma in her mother; Cancer in her mother; Diabetes in her mother; Heart Disease in her brother; High Blood Pressure in her mother. Soc HX:   Social History     Socioeconomic History    Marital status:      Spouse name: None    Number of children: None    Years of education: None    Highest education level: None   Occupational History    None   Tobacco Use    Smoking status: Current Every Day Smoker     Packs/day: 0.75     Years: 31.00     Pack years: 23.25     Types: Cigarettes     Start date: 27    Smokeless tobacco: Never Used   Vaping Use    Vaping Use: Never used   Substance and Sexual Activity    Alcohol use: No    Drug use: No    Sexual activity: None   Other Topics Concern    None   Social History Narrative    None     Social Determinants of Health     Financial Resource Strain:     Difficulty of Paying Living Expenses: Not on file   Food Insecurity:     Worried About Running Out of Food in the Last Year: Not on file    Renae of Food in the Last Year: Not on file   Transportation Needs:     Lack of Transportation (Medical): Not on file    Lack of Transportation (Non-Medical):  Not on file   Physical Activity:     Days of Exercise per Week: Not on file    Minutes of Exercise per Session: Not on file   Stress:     Feeling of Stress : Not on file   Social Connections:     Frequency of Communication with Friends and Family: Not on file    Frequency of Social Gatherings with Friends and Family: Not on file    Attends Scientologist Services: Not on file    Active Member of Clubs or Organizations: Not on file    Attends Club or Organization Meetings: Not on file    Marital Status: Not on file   Intimate Partner Violence:     Fear of Current or Ex-Partner: Not on file    Emotionally Abused: Not on file    Physically Abused: Not on file    Sexually Abused: Not on file   Housing Stability:     Unable to Pay for Housing in the Last Year: Not on file    Number of [place] in the Last Year: Not on file    Unstable Housing in the Last Year: Not on file       Medications:   Medications:    [START ON 4/2/2022] DULoxetine  90 mg Oral Daily    baclofen  10 mg Oral BID    atorvastatin  20 mg Oral Nightly    [START ON 4/2/2022] pantoprazole  40 mg Oral QAM AC    sodium chloride flush  5-40 mL IntraVENous 2 times per day    [START ON 4/2/2022] aspirin  81 mg Oral Daily    enoxaparin  40 mg SubCUTAneous Daily    gabapentin  600 mg Oral BID      Infusions:    sodium chloride       PRN Meds: ondansetron, 4 mg, Q6H PRN  dicyclomine, 20 mg, TID PRN  sodium chloride flush, 5-40 mL, PRN  sodium chloride, , PRN  ondansetron, 4 mg, Q8H PRN   Or  ondansetron, 4 mg, Q6H PRN  acetaminophen, 650 mg, Q6H PRN   Or  acetaminophen, 650 mg, Q6H PRN  polyethylene glycol, 17 g, Daily PRN  potassium chloride, 40 mEq, PRN   Or  potassium alternative oral replacement, 40 mEq, PRN   Or  potassium chloride, 10 mEq, PRN  oxyCODONE-acetaminophen, 1 tablet, Q6H PRN   And  oxyCODONE, 5 mg, Q6H PRN          Electronically signed by Steven Modi MD on 4/1/2022 at 9:31 PM

## 2022-04-02 NOTE — CONSULTS
INPATIENT CARDIOLOGY CONSULT NOTE       Reason for consultation:  Chest Pain     Referring physician:  Fredo Lujan MD     Primary care physician: Jodi Galvez MD, MD      Dear Fredo Lujan MD Thank you for the consult    Chief Complaint   Patient presents with    Chest Pain       History of present illness:Emily is a 40 y. o.year old who  presents with  Chief Complaint   Patient presents with    Chest Pain     Patient is a 43-year-old female who presents to the hospital with chief complaint of left-sided upper chest pain, left shoulder pain. Chest Pain:  Left , sharp like, 7/10, non  exertional in nature, + radiating to left shoulder and arm, associated with dyspnea on exertion, relieved with analgesia -  symptoms have been going on for the past  2 weeks     Smoking history 1 -2 pack/day   Denies any alcohol abuse   Denies any drug abuse      EKG personally reviewed: NSR with no ST-T changes  Serial cardiac troponins are negative  Chest Xray: no acute findings         Past medical history:    has a past medical history of Anemia, Anxiety, Arthritis, Bursitis, Chronic back pain, Incisional hernia, without obstruction or gangrene, Migraines, and Psoriasis. Past surgical history:   has a past surgical history that includes Tubal ligation (); shoulder surgery (Right, );  section; [place] tooth extraction (); pelvic laparoscopy; Endometrial ablation; back surgery (13); Hysterectomy; Tonsillectomy; and ventral hernia repair (2016). Social History:   reports that she has been smoking cigarettes. She started smoking about 35 years ago. She has a 23.25 pack-year smoking history. She has never used smokeless tobacco. She reports that she does not drink alcohol and does not use drugs.   Family history:   no family history of CAD, STROKE of DM    Allergies   Allergen Reactions    Latex Other (See Comments)     Blister    blisters    Morphine Hives    Adhesive Tape Other (See Comments)     Blister    Aspirin Hives     Sometimes can tolerate    Codeine Hives       ondansetron (ZOFRAN) injection 4 mg, Q6H PRN  DULoxetine (CYMBALTA) extended release capsule 90 mg, Daily  baclofen (LIORESAL) tablet 10 mg, BID  atorvastatin (LIPITOR) tablet 20 mg, Nightly  pantoprazole (PROTONIX) tablet 40 mg, QAM AC  dicyclomine (BENTYL) tablet 20 mg, TID PRN  sodium chloride flush 0.9 % injection 5-40 mL, 2 times per day  sodium chloride flush 0.9 % injection 5-40 mL, PRN  0.9 % sodium chloride infusion, PRN  ondansetron (ZOFRAN-ODT) disintegrating tablet 4 mg, Q8H PRN   Or  ondansetron (ZOFRAN) injection 4 mg, Q6H PRN  acetaminophen (TYLENOL) tablet 650 mg, Q6H PRN   Or  acetaminophen (TYLENOL) suppository 650 mg, Q6H PRN  polyethylene glycol (GLYCOLAX) packet 17 g, Daily PRN  aspirin chewable tablet 81 mg, Daily  potassium chloride (KLOR-CON M) extended release tablet 40 mEq, PRN   Or  potassium bicarb-citric acid (EFFER-K) effervescent tablet 40 mEq, PRN   Or  potassium chloride 10 mEq/100 mL IVPB (Peripheral Line), PRN  enoxaparin (LOVENOX) injection 40 mg, Daily  oxyCODONE-acetaminophen (PERCOCET) 5-325 MG per tablet 1 tablet, Q6H PRN   And  oxyCODONE (ROXICODONE) immediate release tablet 5 mg, Q6H PRN  gabapentin (NEURONTIN) capsule 600 mg, BID  nicotine (NICODERM CQ) 21 MG/24HR 1 patch, Daily        Current Facility-Administered Medications   Medication Dose Route Frequency Provider Last Rate Last Admin    ondansetron (ZOFRAN) injection 4 mg  4 mg IntraVENous Q6H PRN Jose Magallon MD   4 mg at 04/01/22 1513    DULoxetine (CYMBALTA) extended release capsule 90 mg  90 mg Oral Daily Jose Magallon MD   90 mg at 04/02/22 0937    baclofen (LIORESAL) tablet 10 mg  10 mg Oral BID Marlyn Starr MD   10 mg at 04/02/22 0938    atorvastatin (LIPITOR) tablet 20 mg  20 mg Oral Nightly Jose Magallon MD   20 mg at 04/01/22 2000    pantoprazole (PROTONIX) tablet 40 mg  40 mg Oral M AC Raciel Kaiser MD   40 mg at 04/02/22 2300    dicyclomine (BENTYL) tablet 20 mg  20 mg Oral TID PRN Raciel Kaiser MD        sodium chloride flush 0.9 % injection 5-40 mL  5-40 mL IntraVENous 2 times per day Raciel Kaiser MD   10 mL at 04/02/22 0940    sodium chloride flush 0.9 % injection 5-40 mL  5-40 mL IntraVENous PRN Jose Magallon MD        0.9 % sodium chloride infusion   IntraVENous PRN Raciel Kaiser MD        ondansetron (ZOFRAN-ODT) disintegrating tablet 4 mg  4 mg Oral Q8H PRN Raciel Kaiser MD        Or    ondansetron (ZOFRAN) injection 4 mg  4 mg IntraVENous Q6H PRN Raciel Kaiser MD        acetaminophen (TYLENOL) tablet 650 mg  650 mg Oral Q6H PRN Raciel Kaiser MD        Or    acetaminophen (TYLENOL) suppository 650 mg  650 mg Rectal Q6H PRN Raciel Kaiser MD        polyethylene glycol (GLYCOLAX) packet 17 g  17 g Oral Daily PRN Raciel Kaiser MD        aspirin chewable tablet 81 mg  81 mg Oral Daily Raciel Kaiser MD   81 mg at 04/02/22 4628    potassium chloride (KLOR-CON M) extended release tablet 40 mEq  40 mEq Oral PRN Raciel Kaiser MD        Or    potassium bicarb-citric acid (EFFER-K) effervescent tablet 40 mEq  40 mEq Oral PRN Raciel Kaiser MD        Or    potassium chloride 10 mEq/100 mL IVPB (Peripheral Line)  10 mEq IntraVENous PRN Jose Magallon MD        enoxaparin (LOVENOX) injection 40 mg  40 mg SubCUTAneous Daily Jose Magallon MD        oxyCODONE-acetaminophen (PERCOCET) 5-325 MG per tablet 1 tablet  1 tablet Oral Q6H PRN Raciel Kaiser MD   1 tablet at 04/01/22 2221    And    oxyCODONE (ROXICODONE) immediate release tablet 5 mg  5 mg Oral Q6H PRN Raciel Kaiser MD   5 mg at 04/02/22 3995    gabapentin (NEURONTIN) capsule 600 mg  600 mg Oral BID Zofia Fusi, APRN - CNP   600 mg at 04/02/22 0937    nicotine (NICODERM CQ) 21 MG/24HR 1 patch  1 patch TransDERmal Daily KARIN Joseph - CNP   1 patch at 04/02/22 0028 Review of Systems:     · Constitutional: No Fever or Weight Loss   · Eyes: No Decreased Vision  · ENT: No Headaches, Hearing Loss or Vertigo  · Cardiovascular: + chest pain,  + dyspnea on exertion, no palpitations or loss of consciousness  · Respiratory: No cough or wheezing    · Gastrointestinal: No abdominal pain, appetite loss, blood in stools, constipation, diarrhea or heartburn  · Genitourinary: No dysuria, trouble voiding, or hematuria  · Musculoskeletal:  No gait disturbance, weakness or joint complaints  · Integumentary: No rash or pruritis  · Neurological: No TIA or stroke symptoms  · Psychiatric: No anxiety or depression  · Endocrine: No malaise, fatigue or temperature intolerance  · Hematologic/Lymphatic: No bleeding problems, blood clots or swollen lymph nodes  · Allergic/Immunologic: No nasal congestion or hives    All other systems were reviewed and were negative otherwise. Physical Examination:      Vitals:    04/02/22 0930   BP: 125/82   Pulse: 86   Resp: 20   Temp: 98.7 °F (37.1 °C)   SpO2: 98%      Wt Readings from Last 3 Encounters:   04/01/22 210 lb (95.3 kg)   05/01/21 213 lb (96.6 kg)   11/23/20 215 lb (97.5 kg)     Body mass index is 31.93 kg/m². General Appearance:  No distress, conversant  Constitutional:  Well developed, Well nourished  HEENT:  Normocephalic, Atraumatic, Oropharynx moist   Nose normal. Neck Supple Carotid: no carotid bruit  Eyes:  Conjunctiva normal, No discharge. Respiratory:    Normal breath sounds, No respiratory distress, No wheezing, no use of accessory muscles, diaphragm movement is normal  + chest Tenderness  Cardiovascular: S1-S2 No murmurs auscultated. No rubs, thrills or gallops. Normal  rhythm. Pedal pulses are normal. No pedal edema  GI:  Soft Non tender, non distended. Musculoskeletal:   No tenderness, No cyanosis, No clubbing. Integument:  Warm, Dry, No erythema, No rash. Lymphatic:  No lymphadenopathy noted.    Neurologic:  Alert & oriented x 3  No focal deficits noted. Psychiatric:  Affect normal, Judgment normal, Mood normal.       Lab Review     Recent Labs     04/02/22  0308   WBC 6.1   HGB 11.5*   HCT 36.4*         Recent Labs     04/02/22  0308      K 4.2      CO2 30   BUN 8   CREATININE 0.7     Recent Labs     04/01/22  1508   AST 15   ALT 12   BILITOT 0.1   ALKPHOS 98     No results for input(s): TROPONINI in the last 72 hours. No results found for: BNP  Lab Results   Component Value Date    INR 0.87 04/01/2022    PROTIME 11.2 (L) 04/01/2022         All labs, images, EKGs were personally reviewed      Assessment: 40 y. o.year old with PMH of  has a past medical history of Anemia, Anxiety, Arthritis, Bursitis, Chronic back pain, Incisional hernia, without obstruction or gangrene, Migraines, and Psoriasis. Recommendations:      1. Chest Pain:     Mixed picture, musculoskeletal as well as exertional symptoms  Patient has chronic pain syndrome as well. Pt has several risk factors for CAD including hx of  smoking, age. EKG NSR/no ischemic changes. Order Stress MPI for Risk Stratification Monday   Order Echocardiogram Monday    Start ASA Statin    2. Hyperlipidemia:  Start Lipitor     3. Essential Hypertension: Blood pressure stable follow-up with    4. Nicotine Dependence: Pt was counseled about smoking cessation. 5. Health maintenance: Risk Factor Modification. Advise exercise as tolerated and low salt low fat diet.        Thank you for the consult    Dr. Dre Lord  4/2/2022 10:22 AM

## 2022-04-02 NOTE — PROGRESS NOTES
V2.0  [facility] Hospitalist Progress Note      Name:  Fermin Moore /Age/Sex: 1975  (40 y.o. female)   MRN & CSN:  [id number] & [id number] Encounter Date/Time: 2022 10:51 AM EDT    Location:  [facility] [place]/[facility]- PCP: Carlos Coburn MD, MD       Hospital Day: 2    Assessment and Plan:   Fermin Moore is a 40 y.o. female who presents with Chest pain    Left chest pain radiating into left shoulder and arm with MARCELINO  Serial cardiac enzymes negative  EKG revealed normal sinus rhythm, no ST-T wave changes  BNP normal  D-dimer normal  Chest x-ray negative for acute pulmonary process  CTA pulmonary negative for PE or acute pulmonary abnormality  Continue asa/plavix   Cardiology consulted. Stress MPI for risk stratification on Monday. Chronic pain. Continue Cymbalta and Neurontin. UDS negative    Daily smoker. 1-2 ppd. Continue Nicotine patch. Smoking cessation counseling. Diet ADULT DIET; Clear Liquid; No Caffeine   DVT Prophylaxis [x] Lovenox, []  Heparin, [] SCDs, [] Ambulation,  [] Eliquis, [] Xarelto  [] Coumadin   Code Status Full Code   Disposition From: home  Expected Disposition: home  Estimated Date of Discharge: 2-3 days  Patient requires continued admission due to cardiac workup   Surrogate Decision Maker/ POA N/A     Subjective:   Patient seen and examined at 0917hrs. She reports persistent left shoulder/arm pain. She has chest discomfort when she takes in a deep breath. She denies nausea, diaphoresis or lightheadedness this morning.       Review of Systems:    General ROS: no for chills or fever  Respiratory ROS: no cough or wheezing +MARCELINO  Cardiovascular ROS: +chest discomfort  Gastrointestinal ROS: no abdominal pain, change in bowel habits, or black or bloody stools  Genito-Urinary ROS: no dysuria, trouble voiding, or hematuria  Musculoskeletal ROS: +left arm pain  Neurological ROS: no TIA or stroke symptoms      Objective:   No intake or output data in the 24 hours ending 04/02/22 1445     Vitals:   Vitals:    04/02/22 0930   BP: 125/82   Pulse: 86   Resp: 20   Temp: 98.7 °F (37.1 °C)   SpO2: 98%       Physical Exam:   General: NAD  Eyes: EOMI, PERRL  Cardiovascular: RRR. S1/S2 auscultated. No murmurs rubs or gallops appreciated  Respiratory: Clear to auscultation bilaterally  Gastrointestinal: Soft, non tender, active bowel sounds  Musculoskeletal: No edema  Skin: warm, dry  Neuro: Alert. Psych: Mood appropriate.      Medications:   Medications:    DULoxetine  90 mg Oral Daily    baclofen  10 mg Oral BID    atorvastatin  20 mg Oral Nightly    pantoprazole  40 mg Oral QAM AC    sodium chloride flush  5-40 mL IntraVENous 2 times per day    aspirin  81 mg Oral Daily    enoxaparin  40 mg SubCUTAneous Daily    gabapentin  600 mg Oral BID    nicotine  1 patch TransDERmal Daily      Infusions:    sodium chloride       PRN Meds: ondansetron, 4 mg, Q6H PRN  dicyclomine, 20 mg, TID PRN  sodium chloride flush, 5-40 mL, PRN  sodium chloride, , PRN  ondansetron, 4 mg, Q8H PRN   Or  ondansetron, 4 mg, Q6H PRN  acetaminophen, 650 mg, Q6H PRN   Or  acetaminophen, 650 mg, Q6H PRN  polyethylene glycol, 17 g, Daily PRN  potassium chloride, 40 mEq, PRN   Or  potassium alternative oral replacement, 40 mEq, PRN   Or  potassium chloride, 10 mEq, PRN  oxyCODONE-acetaminophen, 1 tablet, Q6H PRN   And  oxyCODONE, 5 mg, Q6H PRN        Labs      Recent Results (from the past 24 hour(s))   EKG 12 Lead    Collection Time: 04/01/22  2:00 PM   Result Value Ref Range    Ventricular Rate 93 BPM    Atrial Rate 93 BPM    P-R Interval 130 ms    QRS Duration 86 ms    Q-T Interval 362 ms    QTc Calculation (Bazett) 450 ms    P Axis 53 degrees    R Axis -19 degrees    T Axis 36 degrees    Diagnosis       Sinus rhythm with fusion complexes  Minimal voltage criteria for LVH, may be normal variant  Borderline ECG  No previous ECGs available     CBC with Auto Differential    Collection Time: 04/01/22  3:08 PM Result Value Ref Range    WBC 6.7 4.0 - 10.5 K/CU MM    RBC 3.74 (L) 4.2 - 5.4 M/CU MM    Hemoglobin 11.5 (L) 12.5 - 16.0 GM/DL    Hematocrit 35.7 (L) 37 - 47 %    MCV 95.5 78 - 100 FL    MCH 30.7 27 - 31 PG    MCHC 32.2 32.0 - 36.0 %    RDW 12.7 11.7 - 14.9 %    Platelets 735 045 - 913 K/CU MM    MPV 9.3 7.5 - 11.1 FL    Differential Type AUTOMATED DIFFERENTIAL     Segs Relative 41.8 36 - 66 %    Lymphocytes % 47.9 (H) 24 - 44 %    Monocytes % 7.8 (H) 0 - 4 %    Eosinophils % 2.0 0 - 3 %    Basophils % 0.3 0 - 1 %    Segs Absolute 2.8 K/CU MM    Lymphocytes Absolute 3.2 K/CU MM    Monocytes Absolute 0.5 K/CU MM    Eosinophils Absolute 0.1 K/CU MM    Basophils Absolute 0.0 K/CU MM    Nucleated RBC % 0.0 %    Total Nucleated RBC 0.0 K/CU MM    Total Immature Neutrophil 0.01 K/CU MM    Immature Neutrophil % 0.2 0 - 0.43 %   Comprehensive Metabolic Panel w/ Reflex to MG    Collection Time: 04/01/22  3:08 PM   Result Value Ref Range    Sodium 139 135 - 145 MMOL/L    Potassium 3.7 3.5 - 5.1 MMOL/L    Chloride 104 99 - 110 mMol/L    CO2 28 21 - 32 MMOL/L    BUN 11 6 - 23 MG/DL    CREATININE 0.8 0.6 - 1.1 MG/DL    Glucose 104 (H) 70 - 99 MG/DL    Calcium 8.6 8.3 - 10.6 MG/DL    Albumin 3.7 3.4 - 5.0 GM/DL    Total Protein 6.7 6.4 - 8.2 GM/DL    Total Bilirubin 0.1 0.0 - 1.0 MG/DL    ALT 12 10 - 40 U/L    AST 15 15 - 37 IU/L    Alkaline Phosphatase 98 40 - 129 IU/L    GFR Non-African American >60 >60 mL/min/1.73m2    GFR African American >60 >60 mL/min/1.73m2    Anion Gap 7 4 - 16   Troponin    Collection Time: 04/01/22  3:08 PM   Result Value Ref Range    Troponin T <0.010 <0.01 NG/ML   D-Dimer, Quantitative    Collection Time: 04/01/22  3:08 PM   Result Value Ref Range    D-Dimer, Quant <200 <230 NG/mL(DDU)   Protime-INR    Collection Time: 04/01/22  3:08 PM   Result Value Ref Range    Protime 11.2 (L) 11.7 - 14.5 SECONDS    INR 0.87 INDEX   Brain Natriuretic Peptide    Collection Time: 04/01/22  3:08 PM   Result Value Ref Range    Pro-BNP 34.60 <300 PG/ML   Lipase    Collection Time: 04/01/22  3:08 PM   Result Value Ref Range    Lipase 13 13 - 60 IU/L   Troponin    Collection Time: 04/01/22  4:50 PM   Result Value Ref Range    Troponin T <0.010 <0.01 NG/ML   Troponin    Collection Time: 04/01/22  7:06 PM   Result Value Ref Range    Troponin T <0.010 <0.01 NG/ML   Troponin    Collection Time: 04/01/22  8:55 PM   Result Value Ref Range    Troponin T <0.010 <0.01 NG/ML   CBC    Collection Time: 04/02/22  3:08 AM   Result Value Ref Range    WBC 6.1 4.0 - 10.5 K/CU MM    RBC 3.77 (L) 4.2 - 5.4 M/CU MM    Hemoglobin 11.5 (L) 12.5 - 16.0 GM/DL    Hematocrit 36.4 (L) 37 - 47 %    MCV 96.6 78 - 100 FL    MCH 30.5 27 - 31 PG    MCHC 31.6 (L) 32.0 - 36.0 %    RDW 12.6 11.7 - 14.9 %    Platelets 220 162 - 136 K/CU MM    MPV 9.3 7.5 - 11.1 FL   Basic Metabolic Panel w/ Reflex to MG    Collection Time: 04/02/22  3:08 AM   Result Value Ref Range    Sodium 144 135 - 145 MMOL/L    Potassium 4.2 3.5 - 5.1 MMOL/L    Chloride 105 99 - 110 mMol/L    CO2 30 21 - 32 MMOL/L    Anion Gap 9 4 - 16    BUN 8 6 - 23 MG/DL    CREATININE 0.7 0.6 - 1.1 MG/DL    Glucose 112 (H) 70 - 99 MG/DL    Calcium 8.7 8.3 - 10.6 MG/DL    GFR Non-African American >60 >60 mL/min/1.73m2    GFR African American >60 >60 mL/min/1.73m2   EKG 12 lead    Collection Time: 04/02/22  7:08 AM   Result Value Ref Range    Ventricular Rate 74 BPM    Atrial Rate 74 BPM    P-R Interval 134 ms    QRS Duration 84 ms    Q-T Interval 412 ms    QTc Calculation (Bazett) 457 ms    P Axis 51 degrees    R Axis -16 degrees    T Axis 21 degrees    Diagnosis       Normal sinus rhythm  Normal ECG  When compared with ECG of 01-APR-2022 14:00,  fusion complexes are no longer present          Imaging/Diagnostics Last 24 Hours   XR CHEST PORTABLE    Result Date: 4/1/2022  EXAMINATION: ONE XRAY VIEW OF THE CHEST 4/1/2022 3:36 pm COMPARISON: 01/11/2022 HISTORY: ORDERING SYSTEM PROVIDED HISTORY: chest pain TECHNOLOGIST PROVIDED HISTORY: Reason for exam:->chest pain Reason for Exam: chest pain FINDINGS: Portable study was obtained at low lung volumes with crowding of lung markings at the bases with basilar opacity accentuated by overlying breast shadows. No acute airspace disease, pneumothorax or pleural fluid. Heart size and configuration are normal.  No pneumothorax or pleural fluid. No acute bone finding. No acute cardiopulmonary disease on the low volume portable study.        Electronically signed by KARIN Baez CNP on 4/2/2022 at 10:51 AM

## 2022-04-02 NOTE — FLOWSHEET NOTE
Pt went down to go to the vending machine after being told she is not able to leave the unit several times and instead went outside to smoke a cigarette. NA present with pt. Security and supervisor notified and pt returned to room.

## 2022-04-03 LAB
LV EF: 55 %
LVEF MODALITY: NORMAL

## 2022-04-03 PROCEDURE — 6370000000 HC RX 637 (ALT 250 FOR IP): Performed by: INTERNAL MEDICINE

## 2022-04-03 PROCEDURE — G0378 HOSPITAL OBSERVATION PER HR: HCPCS

## 2022-04-03 PROCEDURE — 93306 TTE W/DOPPLER COMPLETE: CPT

## 2022-04-03 PROCEDURE — 6370000000 HC RX 637 (ALT 250 FOR IP): Performed by: NURSE PRACTITIONER

## 2022-04-03 PROCEDURE — 6360000002 HC RX W HCPCS: Performed by: INTERNAL MEDICINE

## 2022-04-03 PROCEDURE — APPSS15 APP SPLIT SHARED TIME 0-15 MINUTES: Performed by: NURSE PRACTITIONER

## 2022-04-03 PROCEDURE — 2580000003 HC RX 258: Performed by: INTERNAL MEDICINE

## 2022-04-03 PROCEDURE — 94761 N-INVAS EAR/PLS OXIMETRY MLT: CPT

## 2022-04-03 RX ADMIN — OXYCODONE AND ACETAMINOPHEN 1 TABLET: 5; 325 TABLET ORAL at 13:22

## 2022-04-03 RX ADMIN — OXYCODONE AND ACETAMINOPHEN 1 TABLET: 5; 325 TABLET ORAL at 21:26

## 2022-04-03 RX ADMIN — SODIUM CHLORIDE, PRESERVATIVE FREE 10 ML: 5 INJECTION INTRAVENOUS at 21:27

## 2022-04-03 RX ADMIN — DULOXETINE HYDROCHLORIDE 90 MG: 30 CAPSULE, DELAYED RELEASE ORAL at 08:12

## 2022-04-03 RX ADMIN — OXYCODONE HYDROCHLORIDE 5 MG: 5 TABLET ORAL at 19:51

## 2022-04-03 RX ADMIN — PANTOPRAZOLE SODIUM 40 MG: 40 TABLET, DELAYED RELEASE ORAL at 06:43

## 2022-04-03 RX ADMIN — OXYCODONE AND ACETAMINOPHEN 1 TABLET: 5; 325 TABLET ORAL at 03:41

## 2022-04-03 RX ADMIN — OXYCODONE HYDROCHLORIDE 5 MG: 5 TABLET ORAL at 08:16

## 2022-04-03 RX ADMIN — BACLOFEN 10 MG: 10 TABLET ORAL at 21:28

## 2022-04-03 RX ADMIN — ASPIRIN 81 MG 81 MG: 81 TABLET ORAL at 08:12

## 2022-04-03 RX ADMIN — GABAPENTIN 600 MG: 300 CAPSULE ORAL at 21:26

## 2022-04-03 RX ADMIN — GABAPENTIN 600 MG: 300 CAPSULE ORAL at 08:12

## 2022-04-03 RX ADMIN — ATORVASTATIN CALCIUM 20 MG: 20 TABLET, FILM COATED ORAL at 21:26

## 2022-04-03 RX ADMIN — BACLOFEN 10 MG: 10 TABLET ORAL at 08:12

## 2022-04-03 ASSESSMENT — PAIN SCALES - GENERAL
PAINLEVEL_OUTOF10: 4
PAINLEVEL_OUTOF10: 8
PAINLEVEL_OUTOF10: 6
PAINLEVEL_OUTOF10: 7
PAINLEVEL_OUTOF10: 6
PAINLEVEL_OUTOF10: 4

## 2022-04-03 NOTE — PROGRESS NOTES
V2.0  [facility] [place] [facility] Hospitalist Progress Note      Name:  Willie Chavez /Age/Sex: 1975  (40 y.o. female)   MRN & CSN:  [id number] & [id number] Encounter Date/Time: 4/3/2022 1:50 PM EDT    Location:  [facility] PCP: Ada Felix MD, MD       Hospital Day: 3    Assessment and Plan:   Willie Chavez is a 40 y.o. female with pmh of hypertension, hyperlipidemia and nicotine dependency who presents with Chest pain       1. Left chest pain:  Troponin x4-negative. EKG-no acute ischemia. Normal D-dimer. Chest x-ray-no pneumonia. CTA chest-no PE. Continue aspirin and Lipitor. Cardiology consult appreciated-echo and stress test in a.m.      2. Nicotine dependency: Smoking cessation counseling given. Nicotine patch offered.     Chronic problems include hyperlipidemia and chronic pain. DVT prophylaxis: Lovenox  Disposition: Possibly tomorrow. Diet ADULT DIET; Clear Liquid; No Caffeine  Diet NPO Exceptions are: Sips of Water with Meds   DVT Prophylaxis [x] Lovenox, []  Heparin, [] SCDs, [] Ambulation,  [] Eliquis, [] Xarelto  [] Coumadin   Code Status Full Code   Disposition From: Home  Expected Disposition: Home  Estimated Date of Discharge: Possibly tomorrow  Patient requires continued admission due to chest pain   Surrogate Decision Maker/ POA      Subjective:     Chief Complaint: Chest Pain       Willie Chavez is a 40 y.o. female who presents with chest pain. No more chest pain, but a left shoulder pain limiting left shoulder movement. No trauma. Review of Systems:    Review of Systems    Nothing significant    Objective:   No intake or output data in the 24 hours ending 22 1350     Vitals:   Vitals:    22 0817   BP:    Pulse: 61   Resp:    Temp:    SpO2:        Physical Exam:   General: No apparent respiratory distress. Eyes: EOMI. No pallor. Anicteric. ENT: neck supple  Cardiovascular: Regular rate. No murmur no S3.   Respiratory: Clear to auscultation  Gastrointestinal: Soft, non tender, no palpable mass. Genitourinary: no suprapubic tenderness  Musculoskeletal: No edema  Skin: warm, dry  Neuro: Alert. Oriented x3. Psych: Mood appropriate. Medications:   Medications:    DULoxetine  90 mg Oral Daily    baclofen  10 mg Oral BID    atorvastatin  20 mg Oral Nightly    pantoprazole  40 mg Oral QAM AC    sodium chloride flush  5-40 mL IntraVENous 2 times per day    aspirin  81 mg Oral Daily    enoxaparin  40 mg SubCUTAneous Daily    gabapentin  600 mg Oral BID    nicotine  1 patch TransDERmal Daily      Infusions:    sodium chloride       PRN Meds: ondansetron, 4 mg, Q6H PRN  dicyclomine, 20 mg, TID PRN  sodium chloride flush, 5-40 mL, PRN  sodium chloride, , PRN  ondansetron, 4 mg, Q8H PRN   Or  ondansetron, 4 mg, Q6H PRN  acetaminophen, 650 mg, Q6H PRN   Or  acetaminophen, 650 mg, Q6H PRN  polyethylene glycol, 17 g, Daily PRN  potassium chloride, 40 mEq, PRN   Or  potassium alternative oral replacement, 40 mEq, PRN   Or  potassium chloride, 10 mEq, PRN  oxyCODONE-acetaminophen, 1 tablet, Q6H PRN   And  oxyCODONE, 5 mg, Q6H PRN        Labs    No results found for this or any previous visit (from the past 24 hour(s)). Imaging/Diagnostics Last 24 Hours   XR CHEST PORTABLE    Result Date: 4/1/2022  EXAMINATION: ONE XRAY VIEW OF THE CHEST 4/1/2022 3:36 pm COMPARISON: 01/11/2022 HISTORY: ORDERING SYSTEM PROVIDED HISTORY: chest pain TECHNOLOGIST PROVIDED HISTORY: Reason for exam:->chest pain Reason for Exam: chest pain FINDINGS: Portable study was obtained at low lung volumes with crowding of lung markings at the bases with basilar opacity accentuated by overlying breast shadows. No acute airspace disease, pneumothorax or pleural fluid. Heart size and configuration are normal.  No pneumothorax or pleural fluid. No acute bone finding. No acute cardiopulmonary disease on the low volume portable study.      CTA PULMONARY W CONTRAST    Result Date: 4/2/2022  EXAMINATION: CTA OF THE CHEST 4/2/2022 10:28 am TECHNIQUE: CTA of the chest was performed after the administration of intravenous contrast.  Multiplanar reformatted images are provided for review. MIP images are provided for review. Dose modulation, iterative reconstruction, and/or weight based adjustment of the mA/kV was utilized to reduce the radiation dose to as low as reasonably achievable. COMPARISON: None. HISTORY: ORDERING SYSTEM PROVIDED HISTORY: evaluate for PE TECHNOLOGIST PROVIDED HISTORY: Reason for exam:->evaluate for PE Reason for Exam: evaluate for PE FINDINGS: Pulmonary Arteries: Pulmonary arteries are adequately opacified for evaluation. No evidence of intraluminal filling defect to suggest pulmonary embolism. Main pulmonary artery is normal in caliber. Mediastinum: No evidence of mediastinal lymphadenopathy. The heart and pericardium demonstrate no acute abnormality. There is no acute abnormality of the thoracic aorta. Lungs/pleura: There are calcified granulomas in the right lung, with calcified right hilar lymph nodes. The lungs are without acute process. No focal consolidation or pulmonary edema. No evidence of pleural effusion or pneumothorax. Upper Abdomen: Limited images of the upper abdomen are unremarkable. Soft Tissues/Bones: No acute bone or soft tissue abnormality. No evidence of pulmonary embolism or acute pulmonary abnormality.        Electronically signed by Andrew Sanabria MD on 4/3/2022 at 1:50 PM

## 2022-04-03 NOTE — PROGRESS NOTES
This RN found patient not to be in the room or shower where she previously was bathing. Found tele monitor on the bed. This RN found patient to be smoking outside in front of the hospital. This RN explained that the patient is suppose to be on tele monitoring, unless bathing, that she is not suppose to be smoking on the hospital property, and educated the patient on the risks of smoking with a nicotine patch on. Patient came back to the room with this RN where her tele monitor was placed back on and nicotine patch was removed.

## 2022-04-03 NOTE — PROGRESS NOTES
Cardiology Note    Admit Date:  2022     Today's Plan: stress test and echo tomorrow. Admission diagnosis / Complaint : chest pain      Subjective:  Ms. Tyrone Olson is resting in bed. Denies cardiac complaints. Does complain of left shoulder pain. Assessment and Plan:    1. Chest Pain: risk factors for CAD, smoking and age. EKG no ischemic changes. Will get stress test and echo tomorrow. 2. Hyperlipidemia- continue lipitor    3. HTN: blood pressure is stable    4. Nicotine Dependence: counseled about smoking cessation    5. Health Maintenance: recommend lifestyle changes. Low salt, low fat dies. Exercise as tolerated. Objective:   /84   Pulse 61   Temp 97.9 °F (36.6 °C) (Oral)   Resp 16   Ht 5' 8\" (1.727 m)   Wt 210 lb (95.3 kg)   SpO2 98%   BMI 31.93 kg/m²   No intake or output data in the 24 hours ending 22 1153    TELEMETRY: Cale    has a past medical history of Anemia, Anxiety, Arthritis, Bursitis, Chronic back pain, Incisional hernia, without obstruction or gangrene, Migraines, and Psoriasis. has a past surgical history that includes Tubal ligation (); shoulder surgery (Right, 2008);  section; [place] tooth extraction (); pelvic laparoscopy; Endometrial ablation; back surgery (13); Hysterectomy; Tonsillectomy; and ventral hernia repair (2016).      Physical Exam:  General:  Awake, alert, obese  Skin:  Warm and dry  Neck:  JVD not appreciated  Chest:  Clear to auscultation, respiration easy  Cardiovascular:  RRR S1S2  Abdomen:  Soft nontender  Extremities:  No edema    Medications:    DULoxetine  90 mg Oral Daily    baclofen  10 mg Oral BID    atorvastatin  20 mg Oral Nightly    pantoprazole  40 mg Oral QAM AC    sodium chloride flush  5-40 mL IntraVENous 2 times per day    aspirin  81 mg Oral Daily    enoxaparin  40 mg SubCUTAneous Daily    gabapentin  600 mg Oral BID    nicotine  1 patch TransDERmal Daily      sodium chloride ondansetron, dicyclomine, sodium chloride flush, sodium chloride, ondansetron **OR** ondansetron, acetaminophen **OR** acetaminophen, polyethylene glycol, potassium chloride **OR** potassium alternative oral replacement **OR** potassium chloride, oxyCODONE-acetaminophen **AND** oxyCODONE    Lab Data:  CBC:   Recent Labs     04/01/22  1508 04/02/22  0308   WBC 6.7 6.1   HGB 11.5* 11.5*   HCT 35.7* 36.4*   MCV 95.5 96.6    254     BMP:   Recent Labs     04/01/22  1508 04/02/22  0308    144   K 3.7 4.2    105   CO2 28 30   BUN 11 8   CREATININE 0.8 0.7     LIVER PROFILE:   Recent Labs     04/01/22  1508   AST 15   ALT 12   LIPASE 13   BILITOT 0.1   ALKPHOS 98     PT/INR:   Recent Labs     04/01/22  1508   PROTIME 11.2*   INR 0.87     APTT: No results for input(s): APTT in the last 72 hours. BNP:  No results for input(s): BNP in the last 72 hours. TROPONIN: @TROPONINI:3@     KARIN Alarcon CNP, APRN-CNP 4/3/2022 11:53 AM           CARDIOLOGY ATTENDING ADDENDUM    I have seen, spoken to and examined this patient personally, independent of the NP/PAC. I have reviewed the hospital care given to date and reviewed all pertinent labs and imaging. I have spoken with patient, nursing staff and provided written and verbal instructions . The above note has been reviewed. I have spent substantive amount of time in formulating patient care.        Physical Exam:    General:   Awake, alert  Head:normal  Eye:normal  Chest:   Clear to auscultation  0 Basilar crackles   Cardiovascular:  S1S2   Abdomen: soft   Extremities:   0 edema  Pulses; palpable      MEDICAL DECISION MAKING :         Atypical chest pain , with element of musculoskeletal chest pain as well  Plan for stress test and echocardiogram in the morning  Hyperlipidemia: Continue with statin  Essential hypertension: Blood pressure fairly well controlled  Nicotine dependence: Patient was counseled in smoking cessation            Dr. Chapis Galvan Yas Calderon MD

## 2022-04-04 ENCOUNTER — APPOINTMENT (OUTPATIENT)
Dept: NUCLEAR MEDICINE | Age: 47
End: 2022-04-04
Payer: COMMERCIAL

## 2022-04-04 VITALS
BODY MASS INDEX: 31.83 KG/M2 | DIASTOLIC BLOOD PRESSURE: 81 MMHG | HEART RATE: 69 BPM | OXYGEN SATURATION: 98 % | WEIGHT: 210 LBS | SYSTOLIC BLOOD PRESSURE: 135 MMHG | TEMPERATURE: 96.4 F | RESPIRATION RATE: 16 BRPM | HEIGHT: 68 IN

## 2022-04-04 LAB
LV EF: 72 %
LVEF MODALITY: NORMAL

## 2022-04-04 PROCEDURE — A9500 TC99M SESTAMIBI: HCPCS | Performed by: INTERNAL MEDICINE

## 2022-04-04 PROCEDURE — 6360000002 HC RX W HCPCS: Performed by: INTERNAL MEDICINE

## 2022-04-04 PROCEDURE — 78452 HT MUSCLE IMAGE SPECT MULT: CPT

## 2022-04-04 PROCEDURE — 3430000000 HC RX DIAGNOSTIC RADIOPHARMACEUTICAL: Performed by: INTERNAL MEDICINE

## 2022-04-04 PROCEDURE — 94761 N-INVAS EAR/PLS OXIMETRY MLT: CPT

## 2022-04-04 PROCEDURE — 6370000000 HC RX 637 (ALT 250 FOR IP): Performed by: INTERNAL MEDICINE

## 2022-04-04 PROCEDURE — 6370000000 HC RX 637 (ALT 250 FOR IP): Performed by: NURSE PRACTITIONER

## 2022-04-04 PROCEDURE — G0378 HOSPITAL OBSERVATION PER HR: HCPCS

## 2022-04-04 PROCEDURE — 2580000003 HC RX 258: Performed by: INTERNAL MEDICINE

## 2022-04-04 PROCEDURE — 99214 OFFICE O/P EST MOD 30 MIN: CPT | Performed by: INTERNAL MEDICINE

## 2022-04-04 PROCEDURE — 93017 CV STRESS TEST TRACING ONLY: CPT

## 2022-04-04 RX ORDER — DICYCLOMINE HCL 20 MG
20 TABLET ORAL 3 TIMES DAILY PRN
Qty: 120 TABLET | Refills: 3 | Status: SHIPPED | OUTPATIENT
Start: 2022-04-04

## 2022-04-04 RX ADMIN — OXYCODONE HYDROCHLORIDE 5 MG: 5 TABLET ORAL at 09:00

## 2022-04-04 RX ADMIN — GABAPENTIN 600 MG: 300 CAPSULE ORAL at 13:14

## 2022-04-04 RX ADMIN — PANTOPRAZOLE SODIUM 40 MG: 40 TABLET, DELAYED RELEASE ORAL at 05:51

## 2022-04-04 RX ADMIN — REGADENOSON 0.4 MG: 0.08 INJECTION, SOLUTION INTRAVENOUS at 09:54

## 2022-04-04 RX ADMIN — OXYCODONE AND ACETAMINOPHEN 1 TABLET: 5; 325 TABLET ORAL at 13:04

## 2022-04-04 RX ADMIN — DULOXETINE HYDROCHLORIDE 90 MG: 30 CAPSULE, DELAYED RELEASE ORAL at 13:14

## 2022-04-04 RX ADMIN — ASPIRIN 81 MG 81 MG: 81 TABLET ORAL at 13:15

## 2022-04-04 RX ADMIN — OXYCODONE HYDROCHLORIDE 5 MG: 5 TABLET ORAL at 02:55

## 2022-04-04 RX ADMIN — BACLOFEN 10 MG: 10 TABLET ORAL at 13:15

## 2022-04-04 RX ADMIN — KIT FOR THE PREPARATION OF TECHNETIUM TC99M SESTAMIBI 10 MILLICURIE: 1 INJECTION, POWDER, LYOPHILIZED, FOR SOLUTION PARENTERAL at 08:10

## 2022-04-04 RX ADMIN — OXYCODONE AND ACETAMINOPHEN 1 TABLET: 5; 325 TABLET ORAL at 05:51

## 2022-04-04 RX ADMIN — KIT FOR THE PREPARATION OF TECHNETIUM TC99M SESTAMIBI 30 MILLICURIE: 1 INJECTION, POWDER, LYOPHILIZED, FOR SOLUTION PARENTERAL at 10:00

## 2022-04-04 RX ADMIN — SODIUM CHLORIDE, PRESERVATIVE FREE 10 ML: 5 INJECTION INTRAVENOUS at 13:16

## 2022-04-04 ASSESSMENT — PAIN SCALES - GENERAL
PAINLEVEL_OUTOF10: 7
PAINLEVEL_OUTOF10: 10
PAINLEVEL_OUTOF10: 7
PAINLEVEL_OUTOF10: 5
PAINLEVEL_OUTOF10: 5
PAINLEVEL_OUTOF10: 4
PAINLEVEL_OUTOF10: 7
PAINLEVEL_OUTOF10: 5

## 2022-04-04 ASSESSMENT — PAIN DESCRIPTION - LOCATION: LOCATION: ARM

## 2022-04-04 ASSESSMENT — PAIN DESCRIPTION - PAIN TYPE: TYPE: CHRONIC PAIN

## 2022-04-04 NOTE — DISCHARGE SUMMARY
V2.0  Discharge Summary    Name:  Michelle Vasquez /Age/Sex: 1975 (40 y.o. female)   Admit Date: 2022  Discharge Date: 22    MRN & CSN:  [id number] & [id number] Encounter Date and Time 22 4:23 PM EDT    Attending:  Nicolas Pearce MD Discharging Provider: Nicolas Pearce MD       Hospital Course:     Brief HPI: Michelle Vasquez is a 40 y.o. female who presented with chest pain    Brief Problem Based Course:  Michelle Vasquez is a 40 y.o.  female  who has a history of chronic pain, daily smoking, presents with left-sided chest pain, that started 2 days prior to this presentation and has progressively worsened. Pain is sharp. Nonradiating. No diaphoresis. Patient states the pain is mostly positional.  Worse with certain movements. Not necessarily worse with exertion. It is pleuritic. Associated with a dry cough. She has never had a cardiac work-up. She does not have any cardiac family history. Admitted as:  Michelle Vasquez is a 40 y.o. female with pmh of hypertension, hyperlipidemia and nicotine dependency who presents with Chest pain        1. Left chest pain:  Troponin x4-negative. EKG-no acute ischemia. Normal D-dimer. Chest x-ray-no pneumonia. CTA chest-no PE. Continue aspirin and Lipitor. Cardiology consulted. Echo-EF 55% without any significant valvular abnormality. Stress test was negative. Discharged in stable condition.        2. Nicotine dependency: Smoking cessation counseling given. Nicotine patch offered.     Chronic problems include hyperlipidemia and chronic pain. The patient expressed appropriate understanding of, and agreement with the discharge recommendations, medications, and plan.      Consults this admission:  IP CONSULT TO HOSPITALIST  IP CONSULT TO CARDIOLOGY    Discharge Diagnosis:   Chest pain    Chest pain  Nicotine dependency    Discharge Instruction:   Follow up appointments: PCP  Primary care physician: Piedad Hirsch MD, MD within 2 weeks  Diet: regular diet   Activity: activity as tolerated  Disposition: Discharged to:   [x]Home, [][facility], []SNF, []Acute Rehab, []Hospice   Condition on discharge: Stable  Labs and Tests to be Followed up as an outpatient by PCP or Specialist:     Discharge Medications:        Medication List      CONTINUE taking these medications    atorvastatin 20 MG tablet  Commonly known as: LIPITOR     baclofen 10 MG tablet  Commonly known as: LIORESAL     dicyclomine 20 MG tablet  Commonly known as: Bentyl  Take 1 tablet by mouth 3 times daily as needed (Abd cramps)     * DULoxetine 30 MG extended release capsule  Commonly known as: CYMBALTA     * DULoxetine 60 MG extended release capsule  Commonly known as: CYMBALTA     estropipate 3 MG tablet  Commonly known as: OGEN     ibuprofen 800 MG tablet  Commonly known as: ADVIL;MOTRIN  Take 1 tablet by mouth every 6 hours as needed for Pain     oxyCODONE-acetaminophen  MG per tablet  Commonly known as: PERCOCET         * This list has 2 medication(s) that are the same as other medications prescribed for you. Read the directions carefully, and ask your doctor or other care provider to review them with you. Where to Get Your Medications      These medications were sent to [address] [place] [address]    Phone: [phone]   dicyclomine 20 MG tablet        Objective Findings at Discharge:   /81   Pulse 69   Temp 96.4 °F (35.8 °C) (Oral)   Resp 16   Ht 5' 8\" (1.727 m)   Wt 210 lb (95.3 kg)   SpO2 98%   BMI 31.93 kg/m²       She is feeling okay. No chest pain or shortness of breath this morning. Physical Exam:   General: No apparent respiratory distress. Eyes: EOMI. No pallor. Anicteric. ENT: neck supple  Cardiovascular: Regular rate. No murmur no S3.   Respiratory: Clear to auscultation  Gastrointestinal: Soft, non tender, no palpable mass.  Genitourinary: no suprapubic tenderness  Musculoskeletal: No edema  Skin: warm, dry  Neuro: Alert. Oriented x3. Psych: Mood appropriate. Labs and Imaging   Echocardiogram complete 2D with doppler with color    Result Date: 4/4/2022  Transthoracic Echocardiography Report (TTE)  Demographics   Patient Name       Shan ANDERSON Date of Study       04/03/2022   Date of Birth      1975        Gender              Female   Age                40 year(s)        Race                Black   Patient Number     [id number]        Room Number         9084   Visit Number       [id number]   Corporate ID       [id number]   Accession Number   [id number]        [address]   Ordering Physician Darien Sorensen MD Interpreting        Nina Dias                                       Physician           Devika Luke MD  Procedure Type of Study   TTE procedure:ECHOCARDIOGRAM COMPLETE 2D W DOPPLER W COLOR. Procedure Date Date: 04/03/2022 Start: 02:31 PM Study Location: Portable Technical Quality: Adequate visualization Indications:Dyspnea/SOB. Patient Status: Routine Height: 68 inches Weight: 210 pounds BSA: 2.09 m2 BMI: 31.93 kg/m2 HR: 61 bpm BP: 133/92 mmHg  Conclusions   Summary  Left ventricular systolic function is normal.  Ejection fraction is visually estimated at 55%. No significant valvular disease noted. No evidence of any pericardial effusion. Signature   ------------------------------------------------------------------  Electronically signed by Micah Felty MD  (Interpreting physician) on 04/04/2022 at 02:25 PM  ------------------------------------------------------------------   Findings   Left Ventricle  Left ventricular systolic function is normal.  Ejection fraction is visually estimated at 55%. Left ventricle size is normal.  No regional wall motion abnormalites.   Normal diastolic function. Left Atrium  Essentially normal left atrium. Right Atrium  Essentially normal right atrium. Right Ventricle  Essentially normal right ventricle. Aortic Valve  Structurally normal aortic valve. Mitral Valve  Structurally normal mitral valve. Tricuspid Valve  Structurally normal tricuspid valve. Pulmonic Valve  The pulmonic valve was not well visualized. Pericardial Effusion  No evidence of any pericardial effusion. Pleural Effusion  No evidence of pleural effusion. Miscellaneous  IVC and abdominal aorta are within normal limits.   M-Mode/2D Measurements & Calculations   LV Diastolic Dimension:  LV Systolic Dimension:  LA Dimension: 3.3 cmAO Root  4.62 cm                  3.34 cm                 Dimension: 3.6 cmLA Area:  LV FS:27.7 %             LV Volume Diastolic: 69 34.8 cm2  LV PW Diastolic: 2.87 cm ml  LV PW Systolic: 0.97 cm  LV Volume Systolic: 24  Septum Diastolic: 2.63   ml  cm                       LV EDV/LV EDV Index: 69 RV Diastolic Dimension:  Septum Systolic: 8.73 cm YY/66 B3OK ESV/LV ESV   2.56 cm  CO: 3.93 l/min           Index: 24 ml/11 m2  CI: 1.88 l/m*m2          EF Calculated (A4C):    LA/Aorta: 0.92                           65.2 %  LV Area Diastolic: 81.9  EF Calculated (2D):     LA volume/Index: 48 ml  cm2                      53.8 %                  /45J6  LV Area Systolic: 25.9  cm2                      LV Length: 8.55 cm                            LVOT: 2.3 cm  Doppler Measurements & Calculations   MV Peak E-Wave: 86.4    AV Peak Velocity: 101 cm/s   LVOT Peak Velocity:  cm/s                    AV Peak Gradient: 4.08 mmHg  72.9 cm/s  MV Peak A-Wave: 76.5    AV Mean Velocity: 65.9 cm/s  LVOT Mean Velocity:  cm/s                    AV Mean Gradient: 2 mmHg     49.8 cm/s  MV E/A Ratio: 1.13      AV VTI: 19.9 cm              LVOT Peak Gradient: 2  MV Peak Gradient: 2.99  AV Area (Continuity):3.23    mmHgLVOT Mean Gradient:  mmHg                    cm2 1 mmHg   MV P1/2t: 54 msec       LVOT VTI: 15.5 cm  MVA by PHT:4.07 cm2   MV E' Septal Velocity:  9.43 cm/s  MV E' Lateral Velocity:  10.7 cm/s  MV E/E' septal: 9.16  MV E/E' lateral: 8.07      XR CHEST PORTABLE    Result Date: 4/1/2022  EXAMINATION: ONE XRAY VIEW OF THE CHEST 4/1/2022 3:36 pm COMPARISON: 01/11/2022 HISTORY: ORDERING SYSTEM PROVIDED HISTORY: chest pain TECHNOLOGIST PROVIDED HISTORY: Reason for exam:->chest pain Reason for Exam: chest pain FINDINGS: Portable study was obtained at low lung volumes with crowding of lung markings at the bases with basilar opacity accentuated by overlying breast shadows. No acute airspace disease, pneumothorax or pleural fluid. Heart size and configuration are normal.  No pneumothorax or pleural fluid. No acute bone finding. No acute cardiopulmonary disease on the low volume portable study. CTA PULMONARY W CONTRAST    Result Date: 4/2/2022  EXAMINATION: CTA OF THE CHEST 4/2/2022 10:28 am TECHNIQUE: CTA of the chest was performed after the administration of intravenous contrast.  Multiplanar reformatted images are provided for review. MIP images are provided for review. Dose modulation, iterative reconstruction, and/or weight based adjustment of the mA/kV was utilized to reduce the radiation dose to as low as reasonably achievable. COMPARISON: None. HISTORY: ORDERING SYSTEM PROVIDED HISTORY: evaluate for PE TECHNOLOGIST PROVIDED HISTORY: Reason for exam:->evaluate for PE Reason for Exam: evaluate for PE FINDINGS: Pulmonary Arteries: Pulmonary arteries are adequately opacified for evaluation. No evidence of intraluminal filling defect to suggest pulmonary embolism. Main pulmonary artery is normal in caliber. Mediastinum: No evidence of mediastinal lymphadenopathy. The heart and pericardium demonstrate no acute abnormality. There is no acute abnormality of the thoracic aorta. Lungs/pleura:  There are calcified granulomas in the right lung, with calcified right hilar lymph nodes. The lungs are without acute process. No focal consolidation or pulmonary edema. No evidence of pleural effusion or pneumothorax. Upper Abdomen: Limited images of the upper abdomen are unremarkable. Soft Tissues/Bones: No acute bone or soft tissue abnormality. No evidence of pulmonary embolism or acute pulmonary abnormality. NM MYOCARDIAL SPECT REST EXERCISE OR RX    Result Date: 4/4/2022  Cardiac Perfusion Imaging   Demographics   Patient Name      Enoch Lesch N  Date of study        04/04/2022   Date of Birth     1975         Gender               Female   Age               40 year(s)         Race                 Black   Patient Number    [id number]         Room Number          3268   Visit Number      [id number]          Height               68 inches   Corporate ID      [id number]           Weight               210 pounds   Accession Number  [id number]                                        NM Technologist      Marylene Bue, [facility] Center[facility], [facility]  Physician         APRNCNP            Cardiologist         Cindy Miller MD   Conclusions   Summary  Normal LV function. normal EDV  There is normal isotope uptake following exercise and at rest. There is no  evidence of exercise induced ischemia. This is a normal study. Recommendation  Recommendation: Routine follow-up. Signatures   ------------------------------------------------------------------  Electronically signed by Melba Montes MD  (Interpreting cardiologist) on 04/04/2022 at 12:57  ------------------------------------------------------------------  Procedure Procedure Type:   Nuclear Stress Test:Pharmacological, Myocardial Perfusion Imaging with  Pharm, NM MYOCARDIAL SPECT REST EXERCISE OR RX  Indications: Chest pain.   Risk Factors   The patient risk factors include:obesity, Current - Frequency unknown  tobacco use and hypercholesterolemia. Stress Protocols   Resting ECG  Normal sinus rhythm. Resting HR:77 bpm  Resting BP:140/94 mmHg   Peak HR:115 bpm                              HR/BP product:59917  Peak BP:143/76 mmHg  Predicted HR: 175 bpm  % of predicted HR: 66   Exercise duration: 01:23 min  Reason for termination:Completed   ECG Findings  nsr   Symptoms  No symptoms with Lexiscan infusion. Stress Interpretation  ECG portion of stress test is negative for ischemia by diagnostic criteria. Procedure Medications   - Lexiscan I.V. bolus (over 15sec.) 0.4 mg admininstered @ 04/04/2022 10:00. Imaging Protocols   Rest                             Stress   Isotope:Sestamibi 99mTc          Isotope: Sestamibi 99mTc  Isotope dose:10.5 mCi            Isotope dose:32.5 mCi  Administration route: I.V. Administration route: I.V. Injection Date:04/04/2022 08:10  Injection Date:04/04/2022 10:00  Scan Date:04/04/2022 08:55       Scan Date:04/04/2022 10:45   Technique:        SPECT          Technique:        Gated                                                     SPECT   Procedure Description   Upon patient arrival, the patient is identified using two identifiers and  the physician order is verified. An IV is established and 8-11mCi of 99mTc  Sestamibi is intravenously injected and followed with 10mL 0.9% Normal  Saline flush. A circulation period of 45 minutes occurs prior to resting  SPECT imaging. After imaging is complete the patient is escorted to the  stress lab. The patient is connected to the ECG and blood pressure is  measured. The RN starts the stress portion of the exam and rapidly  intravenously injects Lexiscan (regadenosine) 0.4mg over a period of 10 to15  seconds and follows with 5mL 0.9% Normal Saline flush. Immediately following  the Nuclear Technologist intravenously injects 22-33mCi of 99mTc Sestamibi  and 5mL 0.9% Normal Saline flush.  After completion, recovery, and removal of  the IV, the patient rests during the second circulation period of 45  minutes. Final stress SPECT gated imaging is performed. The patient may  return home or to their room after stress imaging. The images are processed  and final charting is completed and sent to the appropriate cardiologist for  interpretation and reporting. Perfusion Interpretation   Normal tracer uptake in all myocardial segment during rest and stress. Imaging Results    Summed scores     - Summed stress score: 9     - Summed rest score: 1     - Summed difference score:    8   Rest ejection  Ejection fraction:72 %  EDV :60 ml  ESV :17 ml  Stroke volume :43 ml  Medical History   Accession#:  [id number]  Admission Data Admission date: 04/01/2022 Admission Time: 14:07 Hospital Status: Outpatient. CBC:   Recent Labs     04/02/22 0308   WBC 6.1   HGB 11.5*        BMP:    Recent Labs     04/02/22 0308      K 4.2      CO2 30   BUN 8   CREATININE 0.7   GLUCOSE 112*     Hepatic: No results for input(s): AST, ALT, ALB, BILITOT, ALKPHOS in the last 72 hours. Lipids: No results found for: CHOL, HDL, TRIG  Hemoglobin A1C: No results found for: LABA1C  TSH: No results found for: TSH  Troponin:   Lab Results   Component Value Date    TROPONINT <0.010 04/01/2022    TROPONINT <0.010 04/01/2022    TROPONINT <0.010 04/01/2022     Lactic Acid: No results for input(s): LACTA in the last 72 hours. BNP: No results for input(s): PROBNP in the last 72 hours.   UA:  Lab Results   Component Value Date    NITRU NEGATIVE 01/11/2022    NITRU NEGATIVE 09/19/2011    COLORU YELLOW 01/11/2022    WBCUA 1 01/11/2022    RBCUA 8 01/11/2022    MUCUS RARE 01/11/2022    TRICHOMONAS NONE SEEN 11/23/2020    BACTERIA RARE 01/11/2022    CLARITYU CLEAR 01/11/2022    SPECGRAV 1.010 01/11/2022    LEUKOCYTESUR NEGATIVE 01/11/2022    UROBILINOGEN NORMAL 01/11/2022    BILIRUBINUR NEGATIVE 01/11/2022    BLOODU MODERATE 01/11/2022    GLUCOSEU NEGATIVE 09/19/2011    KETUA NEGATIVE 01/11/2022     Urine Cultures: No results found for: LABURIN  Blood Cultures: No results found for: BC  No results found for: BLOODCULT2  Organism: No results found for: ORG    Time Spent Discharging patient 31 minutes    Electronically signed by Gwendolyn Llanos MD on 4/4/2022 at 4:23 PM

## 2022-04-04 NOTE — CARE COORDINATION
Chart reviewed. From home, independent. Pt has PCP and RX coverage. No DC needs identified. Please consult CM should any DC needs arise.

## 2022-04-04 NOTE — PROGRESS NOTES
Today's plan: Stress test is normal patient can be discharged home today echo report to follow      Admit Date:  2022    Subjective: Some left arm pain      Chief complaints on admission  Chief Complaint   Patient presents with    Chest Pain         History of present illness:Emily is a 40 y. o.year old who  presents with had concerns including Chest Pain. Past medical history:    has a past medical history of Anemia, Anxiety, Arthritis, Bursitis, Chronic back pain, Incisional hernia, without obstruction or gangrene, Migraines, and Psoriasis. Past surgical history:   has a past surgical history that includes Tubal ligation (); shoulder surgery (Right, 2008);  section; [place] tooth extraction (); pelvic laparoscopy; Endometrial ablation; back surgery (13); Hysterectomy; Tonsillectomy; and ventral hernia repair (2016). Social History:   reports that she has been smoking cigarettes. She started smoking about 35 years ago. She has a 23.25 pack-year smoking history. She has never used smokeless tobacco. She reports that she does not drink alcohol and does not use drugs. Family history:  family history includes Asthma in her mother; Cancer in her mother; Diabetes in her mother; Heart Disease in her brother; High Blood Pressure in her mother.     Allergies   Allergen Reactions    Latex Other (See Comments)     Blister    blisters    Morphine Hives    Adhesive Tape Other (See Comments)     Blister    Aspirin Hives     Sometimes can tolerate    Codeine Hives         Objective:   /81   Pulse 69   Temp 96.4 °F (35.8 °C) (Oral)   Resp 16   Ht 5' 8\" (1.727 m)   Wt 210 lb (95.3 kg)   SpO2 98%   BMI 31.93 kg/m²       Intake/Output Summary (Last 24 hours) at 2022 1259  Last data filed at 2022 0215  Gross per 24 hour   Intake 240 ml   Output 550 ml   Net -310 ml           Physical Exam:  Constitutional:  Well developed, Well nourished, No acute distress, Non-toxic appearance. HENT:  Normocephalic, Atraumatic, Bilateral external ears normal, Oropharynx moist, No oral exudates, Nose normal. Neck- Normal range of motion, No tenderness, Supple, No stridor. Eyes:  PERRL, EOMI, Conjunctiva normal, No discharge. Respiratory:  Normal breath sounds, No respiratory distress, No wheezing, No chest tenderness. ,no use of accessory muscles, diaphragm movement is normal  Cardiovascular: (PMI) apex non displaced,no lifts no thrills, no s3,no s4, Normal heart rate, Normal rhythm, No murmurs, No rubs, No gallops. Carotid arteries pulse and amplitude are normal no bruit, no abdominal bruit noted ( normal abdominal aorta ausculation), femoral arteries pulse and amplitude are normal no bruit, pedal pulses are normal  GI:  Bowel sounds normal, Soft, No tenderness, No masses, No pulsatile masses. : External genitalia appear normal, No masses or lesions. No discharge. No CVA tenderness. Musculoskeletal:  Intact distal pulses, No edema, No tenderness, No cyanosis, No clubbing. Good range of motion in all major joints. No tenderness to palpation or major deformities noted. Back- No tenderness. Integument:  Warm, Dry, No erythema, No rash. Lymphatic:  No lymphadenopathy noted. Neurologic:  Alert & oriented x 3, Normal motor function, Normal sensory function, No focal deficits noted.    Psychiatric:  Affect normal, Judgment normal, Mood normal.     Medications:    DULoxetine  90 mg Oral Daily    baclofen  10 mg Oral BID    atorvastatin  20 mg Oral Nightly    pantoprazole  40 mg Oral QAM AC    sodium chloride flush  5-40 mL IntraVENous 2 times per day    aspirin  81 mg Oral Daily    enoxaparin  40 mg SubCUTAneous Daily    gabapentin  600 mg Oral BID    nicotine  1 patch TransDERmal Daily      sodium chloride       ondansetron, dicyclomine, sodium chloride flush, sodium chloride, ondansetron **OR** ondansetron, acetaminophen **OR** acetaminophen, polyethylene glycol, potassium chloride **OR** potassium alternative oral replacement **OR** potassium chloride, oxyCODONE-acetaminophen **AND** oxyCODONE    Lab Data:  CBC:   Recent Labs     04/01/22  1508 04/02/22  0308   WBC 6.7 6.1   HGB 11.5* 11.5*   HCT 35.7* 36.4*   MCV 95.5 96.6    254     BMP:   Recent Labs     04/01/22  1508 04/02/22  0308    144   K 3.7 4.2    105   CO2 28 30   BUN 11 8   CREATININE 0.8 0.7     LIVER PROFILE:   Recent Labs     04/01/22  1508   AST 15   ALT 12   LIPASE 13   BILITOT 0.1   ALKPHOS 98     PT/INR:   Recent Labs     04/01/22  1508   PROTIME 11.2*   INR 0.87     APTT: No results for input(s): APTT in the last 72 hours. BNP:  No results for input(s): BNP in the last 72 hours. TROPONIN: @TROPONINI:3@      Assessment:  40 y. o.year old who is admitted for          Plan:  Chest pain and left arm pain: As mentioned above stress test is normal pain is atypical patient can be discharged home, echo report to follow  Dyslipidemia: continue statins  HTN: stable,   All labs, medications and tests reviewed, continue all other medications of all above medical condition listed as is.       Brandon Das MD, MD 4/4/2022 12:59 PM

## 2022-04-05 LAB
EKG ATRIAL RATE: 74 BPM
EKG DIAGNOSIS: NORMAL
EKG P AXIS: 51 DEGREES
EKG P-R INTERVAL: 134 MS
EKG Q-T INTERVAL: 412 MS
EKG QRS DURATION: 84 MS
EKG QTC CALCULATION (BAZETT): 457 MS
EKG R AXIS: -16 DEGREES
EKG T AXIS: 21 DEGREES
EKG VENTRICULAR RATE: 74 BPM

## 2022-04-05 PROCEDURE — 93010 ELECTROCARDIOGRAM REPORT: CPT | Performed by: INTERNAL MEDICINE

## 2022-04-07 ENCOUNTER — OFFICE VISIT (OUTPATIENT)
Dept: CARDIOLOGY CLINIC | Age: 47
End: 2022-04-07
Payer: COMMERCIAL

## 2022-04-07 VITALS
BODY MASS INDEX: 32.28 KG/M2 | WEIGHT: 213 LBS | HEART RATE: 80 BPM | HEIGHT: 68 IN | SYSTOLIC BLOOD PRESSURE: 118 MMHG | DIASTOLIC BLOOD PRESSURE: 66 MMHG

## 2022-04-07 DIAGNOSIS — R07.9 CHEST PAIN, UNSPECIFIED TYPE: Primary | ICD-10-CM

## 2022-04-07 DIAGNOSIS — E78.5 DYSLIPIDEMIA: ICD-10-CM

## 2022-04-07 DIAGNOSIS — Z72.0 NICOTINE ABUSE: ICD-10-CM

## 2022-04-07 DIAGNOSIS — E66.09 CLASS 1 OBESITY DUE TO EXCESS CALORIES WITHOUT SERIOUS COMORBIDITY WITH BODY MASS INDEX (BMI) OF 32.0 TO 32.9 IN ADULT: ICD-10-CM

## 2022-04-07 LAB
EKG ATRIAL RATE: 93 BPM
EKG DIAGNOSIS: NORMAL
EKG P AXIS: 53 DEGREES
EKG P-R INTERVAL: 130 MS
EKG Q-T INTERVAL: 362 MS
EKG QRS DURATION: 86 MS
EKG QTC CALCULATION (BAZETT): 450 MS
EKG R AXIS: -19 DEGREES
EKG T AXIS: 36 DEGREES
EKG VENTRICULAR RATE: 93 BPM

## 2022-04-07 PROCEDURE — G8428 CUR MEDS NOT DOCUMENT: HCPCS | Performed by: INTERNAL MEDICINE

## 2022-04-07 PROCEDURE — 4004F PT TOBACCO SCREEN RCVD TLK: CPT | Performed by: INTERNAL MEDICINE

## 2022-04-07 PROCEDURE — G8417 CALC BMI ABV UP PARAM F/U: HCPCS | Performed by: INTERNAL MEDICINE

## 2022-04-07 PROCEDURE — 99214 OFFICE O/P EST MOD 30 MIN: CPT | Performed by: INTERNAL MEDICINE

## 2022-04-07 RX ORDER — HYDROXYZINE HYDROCHLORIDE 10 MG/1
10 TABLET, FILM COATED ORAL 3 TIMES DAILY PRN
COMMUNITY

## 2022-04-07 RX ORDER — MULTIVIT-MIN/IRON/FOLIC ACID/K 18-600-40
CAPSULE ORAL
COMMUNITY

## 2022-04-07 NOTE — PROGRESS NOTES
 Chronic back pain     \"pain since \"\"have sciatica    Incisional hernia, without obstruction or gangrene 2016    Migraines     infrequent    Psoriasis      Past Surgical History:   Procedure Laterality Date    BACK SURGERY  13    L3,4,5, S1- have tit. cage     SECTION      per old chart [address]      per old chart had thermachoice endo ablation and D & C done2009    HYSTERECTOMY      per old chart 2010-robotic LAVH with right S & O    PELVIC LAPAROSCOPY      per old chart 2008- d&C,laproscopcy,lysis of adhesion and removal cyst left ovary/ then 2009 had diag lap with laser, lysis of adhesions, left S & O and removal cyst right ovary    SHOULDER SURGERY Right     TONSILLECTOMY      per old chart T&A done [address]     [address] [facility]  2016    Incisional Ventral hernia repair    WISDOM TOOTH EXTRACTION  1's     Family History   Problem Relation Age of Onset    Cancer Mother         pancreatic and ovarian ca    High Blood Pressure Mother     Diabetes Mother     Asthma Mother     Heart Disease Brother         born with hole in heart , heart murmur    Heart Surgery Brother     Heart Disease Maternal Grandmother      Social History     Tobacco Use    Smoking status: Current Every Day Smoker     Packs/day: 1.00     Years: 31.00     Pack years: 31.00     Types: Cigarettes     Start date:     Smokeless tobacco: Never Used   Substance Use Topics    Alcohol use: No        Review of Systems:     · Constitutional:  No Fever or Weight Loss   · Eyes: No Decreased Vision  · ENT: No Headaches, Hearing Loss or Vertigo  · Cardiovascular: No Chest Pain,  No Shortness of breath, No Palpitations. No Edema   · Respiratory: No cough or wheezing .  No Respiratory distress   · Gastrointestinal: No abdominal pain, appetite loss, blood in stools, constipation, diarrhea or heartburn  · Genitourinary: No dysuria, trouble voiding, or hematuria  · Musculoskeletal:  denies any new  joint aches , or pain   · Integumentary: No rash or pruritis  · Neurological: No TIA or stroke symptoms  · Psychiatric: No anxiety or depression  · Endocrine: No malaise, fatigue or temperature intolerance  · Hematologic/Lymphatic: No bleeding problems, blood clots or swollen lymph nodes  · Allergic/Immunologic: No nasal congestion or hives        Objective:      Physical Exam:    /66   Pulse 80   Ht 5' 8\" (1.727 m)   Wt 213 lb (96.6 kg)   BMI 32.39 kg/m²   Wt Readings from Last 3 Encounters:   04/07/22 213 lb (96.6 kg)   04/01/22 210 lb (95.3 kg)   05/01/21 213 lb (96.6 kg)     Body mass index is 32.39 kg/m². Vitals:    04/07/22 1031   BP: 118/66   Pulse: 80        General Appearance and Constitutional: Conversant, Well developed, Well nourished, No acute distress, Non-toxic appearance. HEENT:  Normocephalic, Atraumatic, Bilateral external ears normal, Oropharynx moist, No oral exudates,   Nose normal.   Neck- Normal range of motion, No tenderness, Supple  Eyes:  EOMI, Conjunctiva normal, No discharge. Respiratory:  Normal breath sounds, No respiratory distress, No wheezing, No Rales, No Ronchi. No chest tenderness. Cardiovascular: S1-S2, no added heart sounds, No Mumurs appreciated. No gallops, rubs. No Pedal Edema   GI:  Bowel sounds normal, Soft, No tenderness,  :  No costovertebral angle tenderness   Musculoskeletal:  No gross deformities.  Back- No tenderness  Integument:  Well hydrated, no rash   Lymphatic:  No lymphadenopathy noted   Neurologic:  Alert & oriented x 3, Normal motor function, normal sensory function, no focal deficits noted   Psychiatric:  Speech and behavior appropriate       Medical decision making and Data review:    DATA:    Lab Results   Component Value Date    TROPONINT <0.010 04/01/2022     BNP:    Lab Results   Component Value Date    PROBNP 34.60 04/01/2022     PT/INR:  No results found for: PTINR  No results found for: LABA1C  No results found for: CHOL, TRIG, HDL, LDLCALC, LDLDIRECT  Lab Results   Component Value Date    WBC 6.1 04/02/2022    HGB 11.5 (L) 04/02/2022    HCT 36.4 (L) 04/02/2022    MCV 96.6 04/02/2022     04/02/2022     TSH: No results found for: TSH  Lab Results   Component Value Date    AST 15 04/01/2022    ALT 12 04/01/2022    BILIDIR 0.2 01/31/2022    BILITOT 0.1 04/01/2022    ALKPHOS 98 04/01/2022         All labs, medications and tests reviewed by myself including data and history from outside source , patient and available family . 1. Chest pain, unspecified type    2. Dyslipidemia    3. Nicotine abuse    4. Class 1 obesity due to excess calories without serious comorbidity with body mass index (BMI) of 32.0 to 32.9 in adult         Impression and Plan:      1. Atypical chest pain: Resolved    Normal stress test MPI   Normal echocardiogram  Patient was counseled, if chest pain reoccurs, will further evaluate. 2. Left shoulder arm and neck pain: With numbness and tingling. Suggest cervical spine MRI. Will defer this to primary care physician  3. Hyperlipidemia: Continue with Lipitor 20 mg daily  4. Obesity BMI 32.39: Low-salt low-fat diet and exercise as tolerated  5. Nicotine dependence: Patient was counseled against smoking    Return if symptoms worsen or fail to improve. Counseled extensively and medication compliance urged. We discussed that for the  prevention of ASCVD our  goal is aggressive risk modification. Patient is encouraged to exercise even a brisk walk for 30 minutes  at least 3 to 4 times a week   Various goals were discussed and questions answered. Continue current medications. Appropriate prescriptions are addressed and refills ordered. Questions answered and patient verbalizes understanding. Call for any problems, questions, or concerns.

## 2022-04-07 NOTE — PATIENT INSTRUCTIONS
Please be informed that if you contact our office outside of normal business hours the physician on call cannot help with any scheduling or rescheduling issues, procedure instruction questions or any type of medication issue. We advise you for any urgent/emergency that you go to the nearest emergency room! PLEASE CALL OUR OFFICE DURING NORMAL BUSINESS HOURS    Monday - Friday   8 am to 5 pm    [place]: [phone]    [place]:  [phone]  **It is YOUR responsibilty to bring medication bottles and/or updated medication list to [address] [place] [address]. This will allow us to better serve you and all your healthcare needs**  [facility] Locations - No appointment necessary. Sites open Monday to Friday. Call your preferred location for test preparation, business hours and other information you need. [employer] accepts [employer]. [place] [facility] [address] Roshan Quinn, [address] [facility]  Phone: [phone] [facility] [address] [facility]  [address].   Sybil Chen [address] [facility]  Phone: [phone]

## 2022-04-07 NOTE — LETTER
[address]  [address] [address]  Phone: [phone]  Fax: [phone]    Tarun Montana MD        April 7, 2022     Patient: Johny Martinez   YOB: 1975   Date of Visit: 4/7/2022       To Whom It May Concern: It is my medical opinion that Charlotta Ahumada may return to work on 04/11/2022. If you have any questions or concerns, please don't hesitate to call.     Sincerely,        Tarun Montana MD

## 2022-05-12 ENCOUNTER — HOSPITAL ENCOUNTER (OUTPATIENT)
Dept: MRI IMAGING | Age: 47
Discharge: HOME | End: 2022-05-12
Payer: COMMERCIAL

## 2022-05-12 DIAGNOSIS — M54.2 CERVICALGIA: ICD-10-CM

## 2022-05-12 DIAGNOSIS — M54.12 BRACHIAL NEURITIS: ICD-10-CM

## 2022-05-12 DIAGNOSIS — M50.20 DISPLACEMENT OF CERVICAL INTERVERTEBRAL DISC WITHOUT MYELOPATHY: ICD-10-CM

## 2022-05-12 PROCEDURE — 72141 MRI NECK SPINE W/O DYE: CPT

## 2022-07-20 ENCOUNTER — TELEPHONE (OUTPATIENT)
Dept: ORTHOPEDIC SURGERY | Age: 47
End: 2022-07-20

## 2022-08-18 ENCOUNTER — OFFICE VISIT (OUTPATIENT)
Dept: ORTHOPEDIC SURGERY | Age: 47
End: 2022-08-18
Payer: COMMERCIAL

## 2022-08-18 VITALS
RESPIRATION RATE: 15 BRPM | HEIGHT: 68 IN | SYSTOLIC BLOOD PRESSURE: 121 MMHG | BODY MASS INDEX: 31.83 KG/M2 | DIASTOLIC BLOOD PRESSURE: 79 MMHG | WEIGHT: 210 LBS | HEART RATE: 71 BPM

## 2022-08-18 DIAGNOSIS — M54.12 RADICULOPATHY, CERVICAL REGION: Primary | ICD-10-CM

## 2022-08-18 PROCEDURE — 4004F PT TOBACCO SCREEN RCVD TLK: CPT | Performed by: ORTHOPAEDIC SURGERY

## 2022-08-18 PROCEDURE — G8417 CALC BMI ABV UP PARAM F/U: HCPCS | Performed by: ORTHOPAEDIC SURGERY

## 2022-08-18 PROCEDURE — G8427 DOCREV CUR MEDS BY ELIG CLIN: HCPCS | Performed by: ORTHOPAEDIC SURGERY

## 2022-08-18 PROCEDURE — 99203 OFFICE O/P NEW LOW 30 MIN: CPT | Performed by: ORTHOPAEDIC SURGERY

## 2022-08-18 RX ORDER — CONJUGATED ESTROGENS 0.62 MG/1
TABLET, FILM COATED ORAL
COMMUNITY
Start: 2022-07-28

## 2022-08-18 RX ORDER — IBUPROFEN 800 MG/1
TABLET ORAL
COMMUNITY

## 2022-08-18 RX ORDER — GABAPENTIN 600 MG/1
TABLET ORAL
COMMUNITY
Start: 2022-07-24

## 2022-08-18 RX ORDER — FUROSEMIDE 20 MG/1
TABLET ORAL
COMMUNITY
Start: 2022-06-17

## 2022-08-18 RX ORDER — CHOLECALCIFEROL (VITAMIN D3) 25 MCG
CAPSULE ORAL
COMMUNITY
Start: 2022-07-28

## 2022-08-18 NOTE — PATIENT INSTRUCTIONS
Continue weight-bearing as tolerated. Continue range of motion exercises as instructed. Ice and elevate as needed. Tylenol or Motrin for pain.    Referral to Dr. Laura Madrid  Follow up once you see Dr. Bharati Garcia MD  [address] [place] [address] Leilani Quinn, [address] [place] [address]  [phone] (Y)  [phone] (f)

## 2022-08-18 NOTE — PROGRESS NOTES
Patient presents to the office today for evaluation of the carpal tunnel syndrome. L>R. Pt states she does have a hx of ulna nerve sx about 12 years ago. Pt states she does have numbness in the index, middle and ring finger on the left hand. She is dropping thing at times.  Pt denies and treatment to the wrist.

## 2022-08-22 PROBLEM — M54.12 RADICULOPATHY, CERVICAL REGION: Status: ACTIVE | Noted: 2022-08-22

## 2022-08-22 ASSESSMENT — ENCOUNTER SYMPTOMS
CHEST TIGHTNESS: 0
BACK PAIN: 1
EYE PAIN: 0
WHEEZING: 0
SHORTNESS OF BREATH: 0
VOMITING: 0
COLOR CHANGE: 0
EYE REDNESS: 0

## 2022-08-22 NOTE — PROGRESS NOTES
8/18/2022   Chief Complaint   Patient presents with    Carpal Tunnel     Carpal tunnel        History of Present Illness:                             Milagro Sepulveda is a 40 y.o. female who presents today for evaluation of her bilateral left worse than right hand pain numbness and tingling. Symptoms most prominent along the index, middle, and ring fingers of the left hand. She feels weakness and clumsiness at times. She has been dropping objects and has difficulty with fine motor tasks and on the left side. She has previously been through ulnar nerve decompression at the left elbow performed by Dr. Yuriy Pantoja many years ago. She recently had an EMG nerve conduction velocity test performed and then presents today for further discussion of her issues and possible surgical intervention. EMG nerve conduction velocity test was reviewed with the patient and there was no evidence of carpal tunnel syndrome. There was mild residual ulnar nerve dysfunction from the elbow. She has dealt with significant spine issues in the past.  She has been treated by pain management for her lumbar spine with nerve stimulator in the past.    Patient presents to the office today for evaluation of the carpal tunnel syndrome. L>R. Pt states she does have a hx of ulna nerve sx about 12 years ago. Pt states she does have numbness in the index, middle and ring finger on the left hand. She is dropping thing at times. Pt denies and treatment to the wrist.        Medical History  Patient's medications, allergies, past medical, surgical, social and family histories were reviewed and updated as appropriate.     Past Medical History:   Diagnosis Date    Anemia     Anxiety     Arthritis     Bursitis     Chronic back pain     \"pain since 2006\"\"have sciatica    Incisional hernia, without obstruction or gangrene 9/23/2016    Migraines     infrequent    Psoriasis      Past Surgical History:   Procedure Laterality Date    BACK SURGERY  11/11/13 L3,4,5, S1- have tit. cage     SECTION      per old chart [address]      per old chart had thermachoice endo ablation and D & C done2009    HYSTERECTOMY (CERVIX STATUS UNKNOWN)      per old chart 2010-robotic LAVH with right S & O    PELVIC LAPAROSCOPY      per old chart 2008- d&C,laproscopcy,lysis of adhesion and removal cyst left ovary/ then 2009 had diag lap with laser, lysis of adhesions, left S & O and removal cyst right ovary    SHOULDER SURGERY Right     TONSILLECTOMY      per old chart T&A done [address]  2016    Incisional Ventral hernia repair    WISDOM TOOTH EXTRACTION  1's     Family History   Problem Relation Age of Onset    Cancer Mother         pancreatic and ovarian ca    High Blood Pressure Mother     Diabetes Mother     Asthma Mother     Heart Disease Brother         born with hole in heart , heart murmur    Heart Surgery Brother     Heart Disease Maternal Grandmother      Social History     Socioeconomic History    Marital status:      Spouse name: None    Number of children: None    Years of education: None    Highest education level: None   Tobacco Use    Smoking status: Every Day     Packs/day: 1.00     Years: 31.00     Pack years: 31.00     Types: Cigarettes     Start date:     Smokeless tobacco: Never   Vaping Use    Vaping Use: Never used   Substance and Sexual Activity    Alcohol use: No    Drug use: No     Current Outpatient Medications   Medication Sig Dispense Refill    VITAMIN D HIGH POTENCY 25 MCG (1000 UT) CAPS       diclofenac sodium (VOLTAREN) 1 % GEL apply FOUR grams TO THE affected AREA UP TO FOUR TIMES DAILY      PREMARIN 0.625 MG tablet TAKE ONE TABLET BY MOUTH DAILY      estropipate (OGEN) 3 MG tablet estropipate 3 mg tablet      furosemide (LASIX) 20 MG tablet TAKE 1 TABLET BY MOUTH DAILY      gabapentin (NEURONTIN) 600 MG tablet TAKE 3 TABLETS BY MOUTH TWICE A DAY ibuprofen (ADVIL;MOTRIN) 800 MG tablet ibuprofen 800 mg tablet      Cholecalciferol (VITAMIN D) 50 MCG (2000 UT) CAPS capsule Take by mouth      hydrOXYzine (ATARAX) 10 MG tablet Take 10 mg by mouth 3 times daily as needed for Itching      dicyclomine (BENTYL) 20 MG tablet Take 1 tablet by mouth 3 times daily as needed (Abd cramps) 120 tablet 3    oxyCODONE-acetaminophen (PERCOCET)  MG per tablet TAKE 1 TABLET BY MOUTH EVERY 6 HOURS AS NEEDED  30 DAYS  0    DULoxetine (CYMBALTA) 60 MG extended release capsule TAKE ONE CAPSULE BY MOUTH DAILY WITH 30MG  2    ibuprofen (ADVIL;MOTRIN) 800 MG tablet Take 1 tablet by mouth every 6 hours as needed for Pain 30 tablet 0    atorvastatin (LIPITOR) 20 MG tablet Take 20 mg by mouth daily      DULoxetine (CYMBALTA) 30 MG capsule Take 90 mg by mouth daily       baclofen (LIORESAL) 10 MG tablet Take 20 mg by mouth 2 times daily       estropipate (OGEN) 3 MG tablet Take 0.625 mg by mouth daily        No current facility-administered medications for this visit. Allergies   Allergen Reactions    Latex Other (See Comments)     Blister    blisters    Morphine Hives    Adhesive Tape Other (See Comments)     Blister    Aspirin Hives     Sometimes can tolerate    Codeine Hives         Review of Systems   Constitutional:  Negative for chills and fever. HENT:  Negative for congestion and sneezing. Eyes:  Negative for pain and redness. Respiratory:  Negative for chest tightness, shortness of breath and wheezing. Cardiovascular:  Negative for chest pain and palpitations. Gastrointestinal:  Negative for vomiting. Musculoskeletal:  Positive for arthralgias, back pain, myalgias and neck pain. Skin:  Negative for color change and rash. Neurological:  Positive for weakness and numbness. Psychiatric/Behavioral:  Negative for agitation. The patient is not nervous/anxious.                                               Examination:  General Exam:  Vitals: /79   Pulse 71   Resp 15   Ht 5' 8\" (1.727 m)   Wt 210 lb (95.3 kg)   BMI 31.93 kg/m²    Physical Exam  Vitals and nursing note reviewed. Constitutional:       Appearance: Normal appearance. HENT:      Head: Normocephalic and atraumatic. Eyes:      Conjunctiva/sclera: Conjunctivae normal.      Pupils: Pupils are equal, round, and reactive to light. Pulmonary:      Effort: Pulmonary effort is normal.   Musculoskeletal:      Left shoulder: No tenderness. Normal range of motion. Normal strength. Right elbow: No swelling, deformity, effusion or lacerations. Normal range of motion. No radial head, medial epicondyle, lateral epicondyle or olecranon process tenderness. Left wrist: No swelling, deformity, effusion or tenderness. Normal range of motion. Cervical back: Normal range of motion. Swelling present. No deformity, signs of trauma or spasms. Pain with movement present. Normal range of motion. Comments: Left upper extremity:  Well-healed surgical scar over the medial aspect of the elbow at the cubital tunnel. Full painless active and passive range of motion of the elbow and forearm with no limitations. Strength is 5 out of 5 during flexion and extension of the elbow and flexion extension of the wrist.  Mild decreased sensation along the index, middle, and ring fingers. There is intact sensation along the thumb and small fingers. Strength is 5 out of 5 with  and pinch of the fingertips. 2+ radial pulse and brisk cap refill present. Skin is intact with no wounds or lesions. No soft tissue swelling or joint effusion of the elbow wrist or hand. Negative carpal compression test and negative Phalen's test.  Positive Spurling's test on the left. Skin:     General: Skin is warm and dry. Neurological:      Mental Status: She is alert and oriented to person, place, and time.    Psychiatric:         Mood and Affect: Mood normal.         Behavior: Behavior normal.          Diagnostic testing:    MRI images of the cervical spine from 5/12/2022 were reviewed by myself and discussed with the patient:       Impression   Stable exam.       Arnold-Chiari malformation type 1. Moderate-sized broad-based central disc protrusion at C3-4 is slightly   eccentric to the right and impinges upon the right portion of the cervical   cord. Moderate-sized broad-based right paracentral disc protrusion at C4-5   impinging upon the right portion of the cervical cord. Annular disc bulges at C5-6 and C6-7. Office Procedures:  Orders Placed This Encounter   Procedures    Ellis Mohs, MD, Orthopedic Surgery, [place]     Referral Priority:   Routine     Referral Type:   Eval and Treat     Referral Reason:   Specialty Services Required     Referred to Provider:   Joaquín Dawson MD     Requested Specialty:   Orthopedic Surgery     Number of Visits Requested:   1       Assessment and Plan  1. cervical radiculopathy    I reviewed the EMG findings with the patient explained that there is no obvious peripheral nerve compression at her carpal tunnel. I am concerned that there may be nerve dysfunction in her cervical spine which is contributing to her symptoms. I recommended that she follow-up with a spine physician regarding her complex issues and to determine if any interventions in her cervical spine may be helpful for her radicular symptoms in her upper extremities. I have sent a referral to Dr. Tiarra Barry for this. I reviewed the EMG results and explained that I do not expect any further surgical intervention to be necessary at this stage for her upper extremities.         Electronically signed by Tin Wiley MD on 8/22/2022 at 12:45 PM

## 2022-11-28 ENCOUNTER — HOSPITAL ENCOUNTER (OUTPATIENT)
Age: 47
Discharge: HOME | End: 2022-11-28
Payer: COMMERCIAL

## 2022-11-28 LAB
ALBUMIN SERPL-MCNC: 4 GM/DL (ref 3.4–5)
ALBUMIN SERPL-MCNC: 4 GM/DL (ref 3.4–5)
ALP BLD-CCNC: 111 IU/L (ref 40–129)
ALP BLD-CCNC: 111 IU/L (ref 40–129)
ALT SERPL-CCNC: 16 U/L (ref 10–40)
ALT SERPL-CCNC: 16 U/L (ref 10–40)
ANION GAP SERPL CALCULATED.3IONS-SCNC: 13 MMOL/L (ref 4–16)
AST SERPL-CCNC: 18 IU/L (ref 15–37)
AST SERPL-CCNC: 18 IU/L (ref 15–37)
BASOPHILS ABSOLUTE: 0 K/CU MM
BASOPHILS RELATIVE PERCENT: 0.3 % (ref 0–1)
BILIRUB SERPL-MCNC: 0.6 MG/DL (ref 0–1)
BILIRUB SERPL-MCNC: 0.6 MG/DL (ref 0–1)
BILIRUBIN DIRECT: 0.2 MG/DL (ref 0–0.3)
BILIRUBIN, INDIRECT: 0.4 MG/DL (ref 0–0.7)
BUN BLDV-MCNC: 7 MG/DL (ref 6–23)
CALCIUM SERPL-MCNC: 9.1 MG/DL (ref 8.3–10.6)
CHLORIDE BLD-SCNC: 106 MMOL/L (ref 99–110)
CO2: 25 MMOL/L (ref 21–32)
CREAT SERPL-MCNC: 0.8 MG/DL (ref 0.6–1.1)
DIFFERENTIAL TYPE: ABNORMAL
EOSINOPHILS ABSOLUTE: 0.1 K/CU MM
EOSINOPHILS RELATIVE PERCENT: 1 % (ref 0–3)
ERYTHROCYTE SEDIMENTATION RATE: 44 MM/HR (ref 0–20)
FOLATE: 13.4 NG/ML (ref 3.1–17.5)
GFR SERPL CREATININE-BSD FRML MDRD: >60 ML/MIN/1.73M2
GLUCOSE BLD-MCNC: 116 MG/DL (ref 70–99)
HCT VFR BLD CALC: 39 % (ref 37–47)
HEMOGLOBIN: 13 GM/DL (ref 12.5–16)
HIGH SENSITIVE C-REACTIVE PROTEIN: 15.7 MG/L (ref 0–5)
IMMATURE NEUTROPHIL %: 0.3 % (ref 0–0.43)
LYMPHOCYTES ABSOLUTE: 2.1 K/CU MM
LYMPHOCYTES RELATIVE PERCENT: 31.2 % (ref 24–44)
MCH RBC QN AUTO: 31.6 PG (ref 27–31)
MCHC RBC AUTO-ENTMCNC: 33.3 % (ref 32–36)
MCV RBC AUTO: 94.9 FL (ref 78–100)
MONOCYTES ABSOLUTE: 0.4 K/CU MM
MONOCYTES RELATIVE PERCENT: 6.3 % (ref 0–4)
NUCLEATED RBC %: 0 %
PDW BLD-RTO: 13.1 % (ref 11.7–14.9)
PLATELET # BLD: 234 K/CU MM (ref 140–440)
PMV BLD AUTO: 11 FL (ref 7.5–11.1)
POTASSIUM SERPL-SCNC: 3.7 MMOL/L (ref 3.5–5.1)
RBC # BLD: 4.11 M/CU MM (ref 4.2–5.4)
SEGMENTED NEUTROPHILS ABSOLUTE COUNT: 4.2 K/CU MM
SEGMENTED NEUTROPHILS RELATIVE PERCENT: 60.9 % (ref 36–66)
SODIUM BLD-SCNC: 144 MMOL/L (ref 135–145)
T4 FREE: 1.11 NG/DL (ref 0.9–1.8)
TOTAL IMMATURE NEUTOROPHIL: 0.02 K/CU MM
TOTAL NUCLEATED RBC: 0 K/CU MM
TOTAL PROTEIN: 6.9 GM/DL (ref 6.4–8.2)
TOTAL PROTEIN: 6.9 GM/DL (ref 6.4–8.2)
TSH HIGH SENSITIVITY: 0.47 UIU/ML (ref 0.27–4.2)
VITAMIN B-12: 436.5 PG/ML (ref 211–911)
WBC # BLD: 6.8 K/CU MM (ref 4–10.5)

## 2022-11-28 PROCEDURE — 80076 HEPATIC FUNCTION PANEL: CPT

## 2022-11-28 PROCEDURE — 80053 COMPREHEN METABOLIC PANEL: CPT

## 2022-11-28 PROCEDURE — 84443 ASSAY THYROID STIM HORMONE: CPT

## 2022-11-28 PROCEDURE — 82607 VITAMIN B-12: CPT

## 2022-11-28 PROCEDURE — 86038 ANTINUCLEAR ANTIBODIES: CPT

## 2022-11-28 PROCEDURE — 86141 C-REACTIVE PROTEIN HS: CPT

## 2022-11-28 PROCEDURE — 85652 RBC SED RATE AUTOMATED: CPT

## 2022-11-28 PROCEDURE — 84439 ASSAY OF FREE THYROXINE: CPT

## 2022-11-28 PROCEDURE — 84155 ASSAY OF PROTEIN SERUM: CPT

## 2022-11-28 PROCEDURE — 85025 COMPLETE CBC W/AUTO DIFF WBC: CPT

## 2022-11-28 PROCEDURE — 84165 PROTEIN E-PHORESIS SERUM: CPT

## 2022-11-28 PROCEDURE — 36415 COLL VENOUS BLD VENIPUNCTURE: CPT

## 2022-11-28 PROCEDURE — 84479 ASSAY OF THYROID (T3 OR T4): CPT

## 2022-11-28 PROCEDURE — 82746 ASSAY OF FOLIC ACID SERUM: CPT

## 2022-11-30 LAB — T3 UPTAKE PERCENT: 28 % (ref 28–41)

## 2022-12-01 LAB — ANTI-NUCLEAR ANTIBODY (ANA): NORMAL

## 2022-12-02 LAB
ALBUMIN SERPL-MCNC: 3.38 G/DL (ref 3.75–5.01)
ALPHA-1-GLOBULIN: 0.44 G/DL (ref 0.19–0.46)
ALPHA-2-GLOBULIN: 1.09 G/DL (ref 0.48–1.05)
BETA GLOBULIN: 0.92 G/DL (ref 0.48–1.1)
GAMMA: 1.17 G/DL (ref 0.62–1.51)
IMMUNOFIXATION REFLEX: ABNORMAL
PROTEIN ELECTROPHORESIS, SERUM: ABNORMAL
SPE/IFE INTERPRETATION: ABNORMAL
TOTAL PROTEIN: 7 G/DL (ref 6.3–8.2)
TRILEPTAL: <1 UG/ML (ref 3–35)

## 2022-12-29 ENCOUNTER — HOSPITAL ENCOUNTER (OUTPATIENT)
Dept: GENERAL RADIOLOGY | Age: 47
Discharge: HOME | End: 2022-12-29
Payer: COMMERCIAL

## 2022-12-29 ENCOUNTER — HOSPITAL ENCOUNTER (OUTPATIENT)
Age: 47
Discharge: HOME | End: 2022-12-29
Payer: COMMERCIAL

## 2022-12-29 DIAGNOSIS — R06.02 SHORTNESS OF BREATH: ICD-10-CM

## 2022-12-29 PROCEDURE — 71046 X-RAY EXAM CHEST 2 VIEWS: CPT

## 2023-01-12 ENCOUNTER — TELEPHONE (OUTPATIENT)
Dept: PULMONOLOGY | Age: 48
End: 2023-01-12

## 2023-01-30 ENCOUNTER — INITIAL CONSULT (OUTPATIENT)
Dept: PULMONOLOGY | Age: 48
End: 2023-01-30
Payer: COMMERCIAL

## 2023-01-30 VITALS
SYSTOLIC BLOOD PRESSURE: 110 MMHG | HEIGHT: 68 IN | BODY MASS INDEX: 30.8 KG/M2 | WEIGHT: 203.2 LBS | DIASTOLIC BLOOD PRESSURE: 74 MMHG | OXYGEN SATURATION: 99 % | HEART RATE: 66 BPM

## 2023-01-30 DIAGNOSIS — E66.9 OBESITY (BMI 30-39.9): ICD-10-CM

## 2023-01-30 DIAGNOSIS — F17.210 CIGARETTE SMOKER: ICD-10-CM

## 2023-01-30 DIAGNOSIS — R06.02 SOBOE (SHORTNESS OF BREATH ON EXERTION): ICD-10-CM

## 2023-01-30 PROCEDURE — 99204 OFFICE O/P NEW MOD 45 MIN: CPT | Performed by: INTERNAL MEDICINE

## 2023-01-30 PROCEDURE — G8484 FLU IMMUNIZE NO ADMIN: HCPCS | Performed by: INTERNAL MEDICINE

## 2023-01-30 PROCEDURE — 4004F PT TOBACCO SCREEN RCVD TLK: CPT | Performed by: INTERNAL MEDICINE

## 2023-01-30 PROCEDURE — G8417 CALC BMI ABV UP PARAM F/U: HCPCS | Performed by: INTERNAL MEDICINE

## 2023-01-30 PROCEDURE — G8427 DOCREV CUR MEDS BY ELIG CLIN: HCPCS | Performed by: INTERNAL MEDICINE

## 2023-01-30 ASSESSMENT — ENCOUNTER SYMPTOMS
EYE ITCHING: 0
EYE DISCHARGE: 0
BACK PAIN: 0
ABDOMINAL PAIN: 0
ABDOMINAL DISTENTION: 0
SHORTNESS OF BREATH: 1
COUGH: 0

## 2023-01-30 NOTE — PROGRESS NOTES
Dieter Delgadorosalba  1975  Referring Provider: Giuseppe Solis MD    Subjective:     Chief Complaint   Patient presents with    Pulmonary Nodule       HPI  Edu Portillo is a 56 y.o. female has been referred for the Pulmonary nodule. She had a CXR done on 12/29/22 showed:    No acute cardiopulmonary process. She had a CTA done on 04/02/22 showed:    No evidence of pulmonary embolism or acute pulmonary abnormality. She has h/o smoking 1/2 to 1 pk/day x 33 yrs off and on and still smoking 1/2 pk./day. She has occasional cough, occasional phlegm, no hemoptysis, no loss of weight, fair appetite, SOBOE walking 1 mile and 1 flight stairs. She had no PFT's. She is not on oxygen. She had no flu vaccine but had COVID vaccine.           Current Outpatient Medications   Medication Sig Dispense Refill    VENTOLIN  (90 Base) MCG/ACT inhaler INHALE 2 PUFFS BY ORAL INHALATION EVERY 4 TO 6 HOURS AS NEEDED      VITAMIN D HIGH POTENCY 25 MCG (1000 UT) CAPS       diclofenac sodium (VOLTAREN) 1 % GEL apply FOUR grams TO THE affected AREA UP TO FOUR TIMES DAILY      PREMARIN 0.625 MG tablet TAKE ONE TABLET BY MOUTH DAILY      estropipate (OGEN) 3 MG tablet estropipate 3 mg tablet      furosemide (LASIX) 20 MG tablet TAKE 1 TABLET BY MOUTH DAILY      gabapentin (NEURONTIN) 600 MG tablet TAKE 3 TABLETS BY MOUTH TWICE A DAY      ibuprofen (ADVIL;MOTRIN) 800 MG tablet ibuprofen 800 mg tablet      Cholecalciferol (VITAMIN D) 50 MCG (2000 UT) CAPS capsule Take by mouth      hydrOXYzine (ATARAX) 10 MG tablet Take 10 mg by mouth 3 times daily as needed for Itching      dicyclomine (BENTYL) 20 MG tablet Take 1 tablet by mouth 3 times daily as needed (Abd cramps) 120 tablet 3    oxyCODONE-acetaminophen (PERCOCET)  MG per tablet TAKE 1 TABLET BY MOUTH EVERY 6 HOURS AS NEEDED  30 DAYS  0    DULoxetine (CYMBALTA) 60 MG extended release capsule TAKE ONE CAPSULE BY MOUTH DAILY WITH 30MG  2    ibuprofen (ADVIL;MOTRIN) 800 MG tablet Take 1 tablet by mouth every 6 hours as needed for Pain 30 tablet 0    atorvastatin (LIPITOR) 20 MG tablet Take 20 mg by mouth daily      DULoxetine (CYMBALTA) 30 MG capsule Take 90 mg by mouth daily       baclofen (LIORESAL) 10 MG tablet Take 20 mg by mouth 2 times daily       estropipate (OGEN) 3 MG tablet Take 0.625 mg by mouth daily        No current facility-administered medications for this visit.        Allergies   Allergen Reactions    Latex Other (See Comments)     Blister    blisters    Morphine Hives    Adhesive Tape Other (See Comments)     Blister    Aspirin Hives     Sometimes can tolerate    Codeine Hives       Past Medical History:   Diagnosis Date    Anemia     Anxiety     Arthritis     Bursitis     Chronic back pain     \"pain since \"\"have sciatica    Incisional hernia, without obstruction or gangrene 2016    Migraines     infrequent    Psoriasis        Past Surgical History:   Procedure Laterality Date    BACK SURGERY  13    L3,4,5, S1- have tit. cage     SECTION      per old chart [address]      per old chart had thermachoice endo ablation and D & C done2009    HYSTERECTOMY (CERVIX STATUS UNKNOWN)      per old chart 2010-robotic LAVH with right S & O    PELVIC LAPAROSCOPY      per old chart 2008- d&C,laproscopcy,lysis of adhesion and removal cyst left ovary/ then 2009 had diag lap with laser, lysis of adhesions, left S & O and removal cyst right ovary    SHOULDER SURGERY Right     TONSILLECTOMY      per old chart T&A done [address] 89  2016    Incisional Ventral hernia repair    WISDOM TOOTH EXTRACTION         Social History     Socioeconomic History    Marital status:      Spouse name: None    Number of children: None    Years of education: None    Highest education level: None   Tobacco Use    Smoking status: Every Day     Packs/day: 1.00     Years: 31.00     Pack years: 31.00     Types: Cigarettes     Start date: 27    Smokeless tobacco: Never   Vaping Use    Vaping Use: Never used   Substance and Sexual Activity    Alcohol use: No    Drug use: No       Review of Systems   Constitutional:  Negative for fatigue. HENT:  Negative for congestion and postnasal drip. Eyes:  Negative for discharge and itching. Respiratory:  Positive for shortness of breath. Negative for cough. Cardiovascular:  Negative for chest pain and leg swelling. Gastrointestinal:  Negative for abdominal distention and abdominal pain. Endocrine: Negative for cold intolerance and heat intolerance. Genitourinary:  Negative for enuresis and frequency. Musculoskeletal:  Negative for arthralgias and back pain. Allergic/Immunologic: Negative for environmental allergies and food allergies. Neurological:  Negative for light-headedness and headaches. Hematological:  Negative for adenopathy. Psychiatric/Behavioral:  Negative for agitation and behavioral problems. Objective:   /74   Pulse 66   Ht 5' 8\" (1.727 m)   Wt 203 lb 3.2 oz (92.2 kg)   SpO2 99%   BMI 30.90 kg/m²   Body mass index is 30.9 kg/m². No flowsheet data found. Mallampati 3    Physical Exam  Vitals reviewed. Constitutional:       Appearance: Normal appearance. HENT:      Head: Normocephalic and atraumatic. Nose: Nose normal.      Mouth/Throat:      Mouth: Mucous membranes are moist.   Eyes:      Extraocular Movements: Extraocular movements intact. Pupils: Pupils are equal, round, and reactive to light. Cardiovascular:      Rate and Rhythm: Normal rate and regular rhythm. Pulses: Normal pulses. Heart sounds: Normal heart sounds. Pulmonary:      Effort: Pulmonary effort is normal.      Breath sounds: Normal breath sounds. Abdominal:      General: Abdomen is flat. Palpations: Abdomen is soft. Musculoskeletal:         General: Normal range of motion.       Cervical back: Normal range of motion and neck supple. Skin:     General: Skin is warm and dry. Neurological:      General: No focal deficit present. Mental Status: She is alert and oriented to person, place, and time. Psychiatric:         Mood and Affect: Mood normal.         Behavior: Behavior normal.       Radiology: No evidence of pulmonary embolism or acute pulmonary abnormality. Assessment and Plan     Problem List          Other    SOBOE (shortness of breath on exertion)      Loose weight  Quit smoking  Albuterol prn  PFT  6 MWT         Relevant Orders    Full PFT Study With Bronchodilator    6 Minute Walk Test    Cigarette smoker      Advised to quit smoking         Relevant Orders    Full PFT Study With Bronchodilator    6 Minute Walk Test    Obesity (BMI 30-39. 9)      Advised to loose weight with diet and excercise                   Follow-Up:    Return in about 4 weeks (around 2/27/2023) for PFT, 6 MWT.      Progress notes sent to the referring Provider    Ольга Ríos MD MD  1/30/2023  11:36 AM

## 2023-02-16 ENCOUNTER — TELEPHONE (OUTPATIENT)
Dept: PULMONOLOGY | Age: 48
End: 2023-02-16

## 2023-02-16 NOTE — TELEPHONE ENCOUNTER
URSZULAM to return our call.  Appointment with Dr. Mimi Sinha needs rescheduled for after PFT testing which is scheduled on 03/27/23

## 2023-03-27 ENCOUNTER — HOSPITAL ENCOUNTER (OUTPATIENT)
Dept: PULMONOLOGY | Age: 48
Discharge: HOME | End: 2023-03-27
Payer: COMMERCIAL

## 2023-03-27 DIAGNOSIS — R06.02 SOBOE (SHORTNESS OF BREATH ON EXERTION): ICD-10-CM

## 2023-03-27 DIAGNOSIS — F17.210 CIGARETTE SMOKER: ICD-10-CM

## 2023-03-27 LAB
DISTANCE WALKED: 1130 FT
DLCO %PRED: 82 %
DLCO PRED: NORMAL
DLCO/VA %PRED: NORMAL
DLCO/VA PRED: NORMAL
DLCO/VA: NORMAL
DLCO: NORMAL
EXPIRATORY TIME-POST: NORMAL
EXPIRATORY TIME: NORMAL
FEF 25-75% %CHNG: NORMAL
FEF 25-75% %PRED-POST: NORMAL
FEF 25-75% %PRED-PRE: NORMAL
FEF 25-75% PRED: NORMAL
FEF 25-75%-POST: NORMAL
FEF 25-75%-PRE: NORMAL
FEV1 %PRED-POST: 112 %
FEV1 %PRED-PRE: 106 %
FEV1 PRED: NORMAL
FEV1-POST: NORMAL
FEV1-PRE: NORMAL
FEV1/FVC %PRED-POST: 97 %
FEV1/FVC %PRED-PRE: 95 %
FEV1/FVC PRED: NORMAL
FEV1/FVC-POST: NORMAL
FEV1/FVC-PRE: NORMAL
FVC %PRED-POST: 114 L
FVC %PRED-PRE: 111 %
FVC PRED: NORMAL
FVC-POST: NORMAL
FVC-PRE: NORMAL
GAW %PRED: NORMAL
GAW PRED: NORMAL
GAW: NORMAL
IC %PRED: NORMAL
IC PRED: NORMAL
IC: NORMAL
MEP: NORMAL
MIP: NORMAL
MVV %PRED-PRE: NORMAL
MVV PRED: NORMAL
MVV-PRE: NORMAL
PEF %PRED-POST: NORMAL
PEF %PRED-PRE: NORMAL
PEF PRED: NORMAL
PEF%CHNG: NORMAL
PEF-POST: NORMAL
PEF-PRE: NORMAL
RAW %PRED: NORMAL
RAW PRED: NORMAL
RAW: NORMAL
RV %PRED: NORMAL
RV PRED: NORMAL
RV: NORMAL
SPO2: 96 %
SVC %PRED: NORMAL
SVC PRED: NORMAL
SVC: NORMAL
TLC %PRED: 97 %
TLC PRED: NORMAL
TLC: NORMAL
VA %PRED: NORMAL
VA PRED: NORMAL
VA: NORMAL
VTG %PRED: NORMAL
VTG PRED: NORMAL
VTG: NORMAL

## 2023-03-27 PROCEDURE — 94729 DIFFUSING CAPACITY: CPT

## 2023-03-27 PROCEDURE — 94726 PLETHYSMOGRAPHY LUNG VOLUMES: CPT

## 2023-03-27 PROCEDURE — 94060 EVALUATION OF WHEEZING: CPT

## 2023-03-27 PROCEDURE — 94618 PULMONARY STRESS TESTING: CPT

## 2023-03-27 ASSESSMENT — PULMONARY FUNCTION TESTS
FEV1/FVC_PERCENT_PREDICTED_PRE: 95
FEV1_PERCENT_PREDICTED_POST: 112
FEV1_PERCENT_PREDICTED_PRE: 106
FEV1/FVC_PERCENT_PREDICTED_POST: 97
FVC_PERCENT_PREDICTED_POST: 114
FVC_PERCENT_PREDICTED_PRE: 111

## 2023-03-27 NOTE — PROGRESS NOTES
[facility] Pulmonary Function Lab - Six Minute Walk  Test Performed on: Room Air____X_ Oxygen at _____ lpm via N/C  Assist Device Used During Test:    None___X_ Cane____ Walker___   Shortness of Breath - Raphael's Scale  0 Nothing at all 5 Severe    0.5 Very very slight 6    1 Very slight 7 Very severe   2 Slight 8     3 Moderate 9 Very very severe   4 Somewhat severe 10 Maximal      Time SpO2 Heart Rate Respiratory Rate Modified Raphaels Scale Other      Baseline             99   %     83    PRE 15                1 minute                       96    %    91       1            2 minutes                   95   %     99      2            3 minutes                     95  %     99         2            4 minutes                  95   %     102           2           5 minutes              98   %     102           2           6 minutes                 100   %      107        2         Recovery   x 1 Min                      100   %     99      POST 18    1         Recovery   x 2 Min                    100      %     92                     Number of Laps_6_ X 170 feet _1070_+ __110_ additional feet = Total _1130_feet  Stopped or paused before 6 minutes?  No_X___ Yes _____   Pre Blood Pressure: _129_ / __94_    Post Blood Pressure:_128  _/__89_  Interpretation:

## 2023-04-25 PROBLEM — K43.2 RECURRENT VENTRAL HERNIA: Status: ACTIVE | Noted: 2023-04-25

## 2023-05-26 ENCOUNTER — TELEPHONE (OUTPATIENT)
Dept: GASTROENTEROLOGY | Age: 48
End: 2023-05-26

## 2023-05-26 PROCEDURE — 99285 EMERGENCY DEPT VISIT HI MDM: CPT

## 2023-05-26 PROCEDURE — 96375 TX/PRO/DX INJ NEW DRUG ADDON: CPT

## 2023-05-26 PROCEDURE — 83690 ASSAY OF LIPASE: CPT

## 2023-05-26 PROCEDURE — 96365 THER/PROPH/DIAG IV INF INIT: CPT

## 2023-05-26 PROCEDURE — 85025 COMPLETE CBC W/AUTO DIFF WBC: CPT

## 2023-05-26 PROCEDURE — 93005 ELECTROCARDIOGRAM TRACING: CPT | Performed by: PHYSICIAN ASSISTANT

## 2023-05-26 PROCEDURE — 80053 COMPREHEN METABOLIC PANEL: CPT

## 2023-05-26 PROCEDURE — 96366 THER/PROPH/DIAG IV INF ADDON: CPT

## 2023-05-26 PROCEDURE — 84484 ASSAY OF TROPONIN QUANT: CPT

## 2023-05-26 PROCEDURE — 83880 ASSAY OF NATRIURETIC PEPTIDE: CPT

## 2023-05-26 NOTE — TELEPHONE ENCOUNTER
Called pt. In regards to a referral for a colon screening.  Lm for pt to see anne marie on 6/14/23 @3pm

## 2023-05-27 ENCOUNTER — APPOINTMENT (OUTPATIENT)
Dept: GENERAL RADIOLOGY | Age: 48
End: 2023-05-27
Payer: COMMERCIAL

## 2023-05-27 ENCOUNTER — APPOINTMENT (OUTPATIENT)
Dept: CT IMAGING | Age: 48
End: 2023-05-27
Payer: COMMERCIAL

## 2023-05-27 ENCOUNTER — HOSPITAL ENCOUNTER (EMERGENCY)
Age: 48
Discharge: HOME | End: 2023-05-27
Payer: COMMERCIAL

## 2023-05-27 VITALS
OXYGEN SATURATION: 98 % | WEIGHT: 210 LBS | RESPIRATION RATE: 12 BRPM | SYSTOLIC BLOOD PRESSURE: 119 MMHG | HEART RATE: 94 BPM | TEMPERATURE: 97.6 F | HEIGHT: 68 IN | DIASTOLIC BLOOD PRESSURE: 66 MMHG | BODY MASS INDEX: 31.83 KG/M2

## 2023-05-27 DIAGNOSIS — R07.9 CHEST PAIN, UNSPECIFIED TYPE: Primary | ICD-10-CM

## 2023-05-27 DIAGNOSIS — M79.2 RADICULAR PAIN IN LEFT ARM: ICD-10-CM

## 2023-05-27 LAB
ALBUMIN SERPL-MCNC: 4.1 GM/DL (ref 3.4–5)
ALP BLD-CCNC: 98 IU/L (ref 40–128)
ALT SERPL-CCNC: 12 U/L (ref 10–40)
ANION GAP SERPL CALCULATED.3IONS-SCNC: 12 MMOL/L (ref 4–16)
AST SERPL-CCNC: 17 IU/L (ref 15–37)
BASOPHILS ABSOLUTE: 0 K/CU MM
BASOPHILS RELATIVE PERCENT: 0.4 % (ref 0–1)
BILIRUB SERPL-MCNC: 0.3 MG/DL (ref 0–1)
BUN SERPL-MCNC: 12 MG/DL (ref 6–23)
CALCIUM SERPL-MCNC: 9.1 MG/DL (ref 8.3–10.6)
CHLORIDE BLD-SCNC: 98 MMOL/L (ref 99–110)
CO2: 27 MMOL/L (ref 21–32)
CREAT SERPL-MCNC: 1 MG/DL (ref 0.6–1.1)
DIFFERENTIAL TYPE: ABNORMAL
EOSINOPHILS ABSOLUTE: 0.1 K/CU MM
EOSINOPHILS RELATIVE PERCENT: 1.3 % (ref 0–3)
GFR SERPL CREATININE-BSD FRML MDRD: >60 ML/MIN/1.73M2
GLUCOSE SERPL-MCNC: 96 MG/DL (ref 70–99)
HCT VFR BLD CALC: 40.9 % (ref 37–47)
HEMOGLOBIN: 13.1 GM/DL (ref 12.5–16)
IMMATURE NEUTROPHIL %: 0.4 % (ref 0–0.43)
LIPASE: 12 IU/L (ref 13–60)
LYMPHOCYTES ABSOLUTE: 3.9 K/CU MM
LYMPHOCYTES RELATIVE PERCENT: 40.5 % (ref 24–44)
MCH RBC QN AUTO: 31 PG (ref 27–31)
MCHC RBC AUTO-ENTMCNC: 32 % (ref 32–36)
MCV RBC AUTO: 96.9 FL (ref 78–100)
MONOCYTES ABSOLUTE: 0.7 K/CU MM
MONOCYTES RELATIVE PERCENT: 7.1 % (ref 0–4)
NUCLEATED RBC %: 0 %
PDW BLD-RTO: 12.6 % (ref 11.7–14.9)
PLATELET # BLD: 321 K/CU MM (ref 140–440)
PMV BLD AUTO: 9.7 FL (ref 7.5–11.1)
POTASSIUM SERPL-SCNC: 3 MMOL/L (ref 3.5–5.1)
PRO-BNP: <5 PG/ML
RBC # BLD: 4.22 M/CU MM (ref 4.2–5.4)
SEGMENTED NEUTROPHILS ABSOLUTE COUNT: 4.9 K/CU MM
SEGMENTED NEUTROPHILS RELATIVE PERCENT: 50.3 % (ref 36–66)
SODIUM BLD-SCNC: 137 MMOL/L (ref 135–145)
TOTAL IMMATURE NEUTOROPHIL: 0.04 K/CU MM
TOTAL NUCLEATED RBC: 0 K/CU MM
TOTAL PROTEIN: 7.4 GM/DL (ref 6.4–8.2)
TROPONIN T: <0.01 NG/ML
WBC # BLD: 9.7 K/CU MM (ref 4–10.5)

## 2023-05-27 PROCEDURE — 96366 THER/PROPH/DIAG IV INF ADDON: CPT

## 2023-05-27 PROCEDURE — 2580000003 HC RX 258: Performed by: PHYSICIAN ASSISTANT

## 2023-05-27 PROCEDURE — 72125 CT NECK SPINE W/O DYE: CPT

## 2023-05-27 PROCEDURE — 96375 TX/PRO/DX INJ NEW DRUG ADDON: CPT

## 2023-05-27 PROCEDURE — 96365 THER/PROPH/DIAG IV INF INIT: CPT

## 2023-05-27 PROCEDURE — 71045 X-RAY EXAM CHEST 1 VIEW: CPT

## 2023-05-27 PROCEDURE — 6370000000 HC RX 637 (ALT 250 FOR IP): Performed by: PHYSICIAN ASSISTANT

## 2023-05-27 PROCEDURE — 6360000002 HC RX W HCPCS: Performed by: PHYSICIAN ASSISTANT

## 2023-05-27 RX ORDER — ASPIRIN 81 MG/1
324 TABLET, CHEWABLE ORAL ONCE
Status: DISCONTINUED | OUTPATIENT
Start: 2023-05-27 | End: 2023-05-27 | Stop reason: HOSPADM

## 2023-05-27 RX ORDER — POTASSIUM CHLORIDE 20 MEQ/1
20 TABLET, EXTENDED RELEASE ORAL 2 TIMES DAILY
Qty: 10 TABLET | Refills: 0 | Status: SHIPPED | OUTPATIENT
Start: 2023-05-27 | End: 2023-06-01

## 2023-05-27 RX ORDER — POTASSIUM CHLORIDE 20 MEQ/1
20 TABLET, EXTENDED RELEASE ORAL 2 TIMES DAILY
Qty: 10 TABLET | Refills: 5 | Status: SHIPPED | OUTPATIENT
Start: 2023-05-27 | End: 2023-05-27 | Stop reason: SDUPTHER

## 2023-05-27 RX ORDER — SODIUM CHLORIDE 9 MG/ML
INJECTION, SOLUTION INTRAVENOUS CONTINUOUS
Status: DISCONTINUED | OUTPATIENT
Start: 2023-05-27 | End: 2023-05-27 | Stop reason: HOSPADM

## 2023-05-27 RX ORDER — POTASSIUM CHLORIDE 20 MEQ/1
40 TABLET, EXTENDED RELEASE ORAL ONCE
Status: COMPLETED | OUTPATIENT
Start: 2023-05-27 | End: 2023-05-27

## 2023-05-27 RX ORDER — TRAMADOL HYDROCHLORIDE 50 MG/1
50 TABLET ORAL ONCE
Status: COMPLETED | OUTPATIENT
Start: 2023-05-27 | End: 2023-05-27

## 2023-05-27 RX ORDER — ONDANSETRON 2 MG/ML
4 INJECTION INTRAMUSCULAR; INTRAVENOUS EVERY 30 MIN PRN
Status: DISCONTINUED | OUTPATIENT
Start: 2023-05-27 | End: 2023-05-27 | Stop reason: HOSPADM

## 2023-05-27 RX ORDER — GABAPENTIN 100 MG/1
100 CAPSULE ORAL ONCE
Status: COMPLETED | OUTPATIENT
Start: 2023-05-27 | End: 2023-05-27

## 2023-05-27 RX ORDER — FENTANYL CITRATE 50 UG/ML
50 INJECTION, SOLUTION INTRAMUSCULAR; INTRAVENOUS ONCE
Status: COMPLETED | OUTPATIENT
Start: 2023-05-27 | End: 2023-05-27

## 2023-05-27 RX ORDER — POTASSIUM CHLORIDE 7.45 MG/ML
10 INJECTION INTRAVENOUS
Status: COMPLETED | OUTPATIENT
Start: 2023-05-27 | End: 2023-05-27

## 2023-05-27 RX ADMIN — SODIUM CHLORIDE: 9 INJECTION, SOLUTION INTRAVENOUS at 02:03

## 2023-05-27 RX ADMIN — FENTANYL CITRATE 50 MCG: 50 INJECTION, SOLUTION INTRAMUSCULAR; INTRAVENOUS at 01:42

## 2023-05-27 RX ADMIN — POTASSIUM CHLORIDE 10 MEQ: 7.46 INJECTION, SOLUTION INTRAVENOUS at 03:04

## 2023-05-27 RX ADMIN — TRAMADOL HYDROCHLORIDE 50 MG: 50 TABLET ORAL at 03:26

## 2023-05-27 RX ADMIN — POTASSIUM CHLORIDE 10 MEQ: 7.46 INJECTION, SOLUTION INTRAVENOUS at 01:52

## 2023-05-27 RX ADMIN — POTASSIUM CHLORIDE 40 MEQ: 1500 TABLET, EXTENDED RELEASE ORAL at 01:47

## 2023-05-27 RX ADMIN — GABAPENTIN 100 MG: 100 CAPSULE ORAL at 03:06

## 2023-05-27 NOTE — ED PROVIDER NOTES
Per my interpretation demonstrates normal sinus rhythm at a rate of 93 bpm.  Normal axis. Normal intervals. No acute ST segment changes.      [address] DO Ghassan  05/27/23 7402
inappropriate. If there are any questions or concerns please feel free to contact the dictating provider for clarification. Please note Images are personally interpreted by this Provider (PA [place]. )However final disposition is made with deference to Radiologist interpretation of said images.        Merle Jovel, [address] [place] [address]  05/27/23 5218

## 2023-05-28 LAB
EKG ATRIAL RATE: 93 BPM
EKG DIAGNOSIS: NORMAL
EKG P AXIS: 48 DEGREES
EKG P-R INTERVAL: 132 MS
EKG Q-T INTERVAL: 362 MS
EKG QRS DURATION: 82 MS
EKG QTC CALCULATION (BAZETT): 450 MS
EKG R AXIS: -29 DEGREES
EKG T AXIS: 38 DEGREES
EKG VENTRICULAR RATE: 93 BPM

## 2023-05-28 PROCEDURE — 93010 ELECTROCARDIOGRAM REPORT: CPT | Performed by: INTERNAL MEDICINE

## 2023-06-13 PROBLEM — K43.6 INCARCERATED VENTRAL HERNIA: Status: ACTIVE | Noted: 2023-06-13

## 2023-07-07 PROBLEM — Z87.19 S/P LAPAROSCOPIC HERNIA REPAIR: Status: ACTIVE | Noted: 2023-07-07

## 2023-07-07 PROBLEM — Z98.890 S/P LAPAROSCOPIC HERNIA REPAIR: Status: ACTIVE | Noted: 2023-07-07

## 2023-08-01 ENCOUNTER — HOSPITAL ENCOUNTER (OUTPATIENT)
Dept: GENERAL RADIOLOGY | Age: 48
Discharge: HOME | End: 2023-08-01
Payer: COMMERCIAL

## 2023-08-01 DIAGNOSIS — M25.512 LEFT SHOULDER PAIN, UNSPECIFIED CHRONICITY: ICD-10-CM

## 2023-08-01 PROCEDURE — 6360000004 HC RX CONTRAST MEDICATION: Performed by: ORTHOPAEDIC SURGERY

## 2023-08-01 PROCEDURE — 23350 INJECTION FOR SHOULDER X-RAY: CPT

## 2023-08-01 RX ADMIN — IOPAMIDOL 10 ML: 755 INJECTION, SOLUTION INTRAVENOUS at 14:42

## 2023-10-18 ENCOUNTER — APPOINTMENT (OUTPATIENT)
Dept: GENERAL RADIOLOGY | Age: 48
End: 2023-10-18
Payer: COMMERCIAL

## 2023-10-18 ENCOUNTER — APPOINTMENT (OUTPATIENT)
Dept: CT IMAGING | Age: 48
End: 2023-10-18
Attending: EMERGENCY MEDICINE
Payer: COMMERCIAL

## 2023-10-18 ENCOUNTER — HOSPITAL ENCOUNTER (EMERGENCY)
Age: 48
Discharge: LEFT BEFORE DISCHARGE | End: 2023-10-18
Attending: EMERGENCY MEDICINE
Payer: COMMERCIAL

## 2023-10-18 VITALS
TEMPERATURE: 97.4 F | SYSTOLIC BLOOD PRESSURE: 158 MMHG | HEART RATE: 83 BPM | RESPIRATION RATE: 16 BRPM | OXYGEN SATURATION: 98 % | DIASTOLIC BLOOD PRESSURE: 87 MMHG

## 2023-10-18 DIAGNOSIS — R41.82 ALTERED MENTAL STATUS, UNSPECIFIED ALTERED MENTAL STATUS TYPE: Primary | ICD-10-CM

## 2023-10-18 DIAGNOSIS — F19.90 POLYSUBSTANCE USE DISORDER: ICD-10-CM

## 2023-10-18 LAB
ACETAMINOPHEN LEVEL: <5 UG/ML (ref 15–30)
ALBUMIN SERPL-MCNC: 3.8 GM/DL (ref 3.4–5)
ALCOHOL SCREEN SERUM: <0.01 %WT/VOL
ALP BLD-CCNC: 116 IU/L (ref 40–129)
ALT SERPL-CCNC: 17 U/L (ref 10–40)
AMPHETAMINES: NEGATIVE
ANION GAP SERPL CALCULATED.3IONS-SCNC: 8 MMOL/L (ref 4–16)
AST SERPL-CCNC: 20 IU/L (ref 15–37)
BACTERIA: NEGATIVE /HPF
BARBITURATE SCREEN URINE: NEGATIVE
BASE EXCESS MIXED: 2.4 (ref 0–2.3)
BASOPHILS ABSOLUTE: 0 K/CU MM
BASOPHILS RELATIVE PERCENT: 0.6 % (ref 0–1)
BENZODIAZEPINE SCREEN, URINE: ABNORMAL
BILIRUB SERPL-MCNC: 0.2 MG/DL (ref 0–1)
BILIRUBIN URINE: NEGATIVE MG/DL
BLOOD, URINE: ABNORMAL
BUN SERPL-MCNC: 16 MG/DL (ref 6–23)
CALCIUM SERPL-MCNC: 9.1 MG/DL (ref 8.3–10.6)
CANNABINOID SCREEN URINE: ABNORMAL
CHLORIDE BLD-SCNC: 101 MMOL/L (ref 99–110)
CLARITY, UA: CLEAR
CO2: 26 MMOL/L (ref 21–32)
COCAINE METABOLITE: ABNORMAL
COLOR, UA: YELLOW
COMMENT: ABNORMAL
CREAT SERPL-MCNC: 0.8 MG/DL (ref 0.6–1.1)
DIFFERENTIAL TYPE: ABNORMAL
DOSE AMOUNT: ABNORMAL
DOSE AMOUNT: ABNORMAL
DOSE TIME: ABNORMAL
DOSE TIME: ABNORMAL
EKG ATRIAL RATE: 84 BPM
EKG DIAGNOSIS: NORMAL
EKG P AXIS: 66 DEGREES
EKG P-R INTERVAL: 160 MS
EKG Q-T INTERVAL: 390 MS
EKG QRS DURATION: 112 MS
EKG QTC CALCULATION (BAZETT): 460 MS
EKG R AXIS: -26 DEGREES
EKG T AXIS: 50 DEGREES
EKG VENTRICULAR RATE: 84 BPM
EOSINOPHILS ABSOLUTE: 0.1 K/CU MM
EOSINOPHILS RELATIVE PERCENT: 2.2 % (ref 0–3)
FENTANYL URINE: NEGATIVE
GFR SERPL CREATININE-BSD FRML MDRD: >60 ML/MIN/1.73M2
GLUCOSE BLD-MCNC: 71 MG/DL (ref 70–99)
GLUCOSE SERPL-MCNC: 67 MG/DL (ref 70–99)
GLUCOSE, URINE: NEGATIVE MG/DL
HCO3 VENOUS: 29.9 MMOL/L (ref 19–25)
HCT VFR BLD CALC: 36.8 % (ref 37–47)
HEMOGLOBIN: 11.8 GM/DL (ref 12.5–16)
IMMATURE NEUTROPHIL %: 0.2 % (ref 0–0.43)
KETONES, URINE: ABNORMAL MG/DL
LACTIC ACID, SEPSIS: 1.5 MMOL/L (ref 0.4–2)
LEUKOCYTE ESTERASE, URINE: NEGATIVE
LYMPHOCYTES ABSOLUTE: 2 K/CU MM
LYMPHOCYTES RELATIVE PERCENT: 37.5 % (ref 24–44)
MCH RBC QN AUTO: 31.6 PG (ref 27–31)
MCHC RBC AUTO-ENTMCNC: 32.1 % (ref 32–36)
MCV RBC AUTO: 98.7 FL (ref 78–100)
MONOCYTES ABSOLUTE: 0.5 K/CU MM
MONOCYTES RELATIVE PERCENT: 8.8 % (ref 0–4)
MUCUS: ABNORMAL HPF
NITRITE URINE, QUANTITATIVE: NEGATIVE
NUCLEATED RBC %: 0 %
O2 SAT, VEN: 61.4 % (ref 50–70)
OPIATES, URINE: NEGATIVE
OXYCODONE: ABNORMAL
PCO2, VEN: 58 MMHG (ref 38–52)
PDW BLD-RTO: 13.3 % (ref 11.7–14.9)
PH VENOUS: 7.32 (ref 7.32–7.42)
PH, URINE: 6 (ref 5–8)
PLATELET # BLD: 293 K/CU MM (ref 140–440)
PMV BLD AUTO: 8.8 FL (ref 7.5–11.1)
PO2, VEN: 32 MMHG (ref 28–48)
POTASSIUM SERPL-SCNC: 4 MMOL/L (ref 3.5–5.1)
PRO-BNP: <36 PG/ML
PROCALCITONIN SERPL-MCNC: 0.02 NG/ML
PROTEIN UA: NEGATIVE MG/DL
RBC # BLD: 3.73 M/CU MM (ref 4.2–5.4)
RBC URINE: 3 /HPF (ref 0–6)
SALICYLATE LEVEL: <0.3 MG/DL (ref 15–30)
SEGMENTED NEUTROPHILS ABSOLUTE COUNT: 2.7 K/CU MM
SEGMENTED NEUTROPHILS RELATIVE PERCENT: 50.7 % (ref 36–66)
SODIUM BLD-SCNC: 135 MMOL/L (ref 135–145)
SPECIFIC GRAVITY UA: 1.02 (ref 1–1.03)
SQUAMOUS EPITHELIAL: 2 /HPF
TOTAL IMMATURE NEUTOROPHIL: 0.01 K/CU MM
TOTAL NUCLEATED RBC: 0 K/CU MM
TOTAL PROTEIN: 7.1 GM/DL (ref 6.4–8.2)
TRICHOMONAS: ABNORMAL /HPF
UROBILINOGEN, URINE: 0.2 MG/DL (ref 0.2–1)
WBC # BLD: 5.4 K/CU MM (ref 4–10.5)
WBC UA: <1 /HPF (ref 0–5)

## 2023-10-18 PROCEDURE — 80053 COMPREHEN METABOLIC PANEL: CPT

## 2023-10-18 PROCEDURE — 84145 PROCALCITONIN (PCT): CPT

## 2023-10-18 PROCEDURE — 93010 ELECTROCARDIOGRAM REPORT: CPT | Performed by: INTERNAL MEDICINE

## 2023-10-18 PROCEDURE — 99285 EMERGENCY DEPT VISIT HI MDM: CPT

## 2023-10-18 PROCEDURE — 70498 CT ANGIOGRAPHY NECK: CPT

## 2023-10-18 PROCEDURE — 6360000004 HC RX CONTRAST MEDICATION: Performed by: EMERGENCY MEDICINE

## 2023-10-18 PROCEDURE — 93005 ELECTROCARDIOGRAM TRACING: CPT | Performed by: EMERGENCY MEDICINE

## 2023-10-18 PROCEDURE — 85025 COMPLETE CBC W/AUTO DIFF WBC: CPT

## 2023-10-18 PROCEDURE — 71250 CT THORAX DX C-: CPT

## 2023-10-18 PROCEDURE — 2500000003 HC RX 250 WO HCPCS: Performed by: EMERGENCY MEDICINE

## 2023-10-18 PROCEDURE — 81001 URINALYSIS AUTO W/SCOPE: CPT

## 2023-10-18 PROCEDURE — 6360000002 HC RX W HCPCS

## 2023-10-18 PROCEDURE — 70450 CT HEAD/BRAIN W/O DYE: CPT

## 2023-10-18 PROCEDURE — 82805 BLOOD GASES W/O2 SATURATION: CPT

## 2023-10-18 PROCEDURE — 80307 DRUG TEST PRSMV CHEM ANLYZR: CPT

## 2023-10-18 PROCEDURE — 82962 GLUCOSE BLOOD TEST: CPT

## 2023-10-18 PROCEDURE — G0480 DRUG TEST DEF 1-7 CLASSES: HCPCS

## 2023-10-18 PROCEDURE — 83605 ASSAY OF LACTIC ACID: CPT

## 2023-10-18 PROCEDURE — 96374 THER/PROPH/DIAG INJ IV PUSH: CPT

## 2023-10-18 PROCEDURE — 71045 X-RAY EXAM CHEST 1 VIEW: CPT

## 2023-10-18 PROCEDURE — 83880 ASSAY OF NATRIURETIC PEPTIDE: CPT

## 2023-10-18 RX ORDER — OXCARBAZEPINE 300 MG/1
600 TABLET, FILM COATED ORAL EVERY MORNING
COMMUNITY
End: 2024-07-09 | Stop reason: SDUPTHER

## 2023-10-18 RX ORDER — OMEGA-3S/DHA/EPA/FISH OIL/D3 300MG-1000
400 CAPSULE ORAL DAILY
COMMUNITY

## 2023-10-18 RX ORDER — IOPAMIDOL 755 MG/ML
80 INJECTION, SOLUTION INTRAVASCULAR
Status: COMPLETED | OUTPATIENT
Start: 2023-10-18 | End: 2023-10-18

## 2023-10-18 RX ORDER — GABAPENTIN 800 MG/1
2000 TABLET ORAL NIGHTLY
COMMUNITY
End: 2024-07-09

## 2023-10-18 RX ORDER — NALOXONE HYDROCHLORIDE 1 MG/ML
4 INJECTION INTRAMUSCULAR; INTRAVENOUS; SUBCUTANEOUS ONCE
Status: COMPLETED | OUTPATIENT
Start: 2023-10-18 | End: 2023-10-18

## 2023-10-18 RX ORDER — OXCARBAZEPINE 300 MG/1
1200 TABLET, FILM COATED ORAL NIGHTLY
COMMUNITY
End: 2024-07-09

## 2023-10-18 RX ORDER — DEXTROSE MONOHYDRATE 25 G/50ML
25 INJECTION, SOLUTION INTRAVENOUS ONCE
Status: COMPLETED | OUTPATIENT
Start: 2023-10-18 | End: 2023-10-18

## 2023-10-18 RX ORDER — ZONISAMIDE 100 MG/1
100 CAPSULE ORAL 2 TIMES DAILY
COMMUNITY
Start: 2023-09-26 | End: 2024-07-09

## 2023-10-18 RX ORDER — NALOXONE HYDROCHLORIDE 1 MG/ML
INJECTION INTRAMUSCULAR; INTRAVENOUS; SUBCUTANEOUS
Status: COMPLETED
Start: 2023-10-18 | End: 2023-10-18

## 2023-10-18 RX ADMIN — IOPAMIDOL 80 ML: 755 INJECTION, SOLUTION INTRAVENOUS at 11:43

## 2023-10-18 RX ADMIN — DEXTROSE MONOHYDRATE 25 G: 25 INJECTION, SOLUTION INTRAVENOUS at 11:03

## 2023-10-18 RX ADMIN — NALOXONE HYDROCHLORIDE 4 MG: 1 INJECTION PARENTERAL at 10:50

## 2023-10-18 RX ADMIN — NALOXONE HYDROCHLORIDE 4 MG: 1 INJECTION INTRAMUSCULAR; INTRAVENOUS; SUBCUTANEOUS at 10:50

## 2023-10-18 NOTE — ED NOTES
Pt now talking, purposeful movements pulling covers up, coughing and clearing throat.       Osmel Prado RN  10/18/23 3528

## 2023-10-18 NOTE — ED NOTES
Pt lethargic but arousing telling this nurse she needed to urinate. Made aware of pure wick in place d/t alertness and this would suction urine.       Frannie Kirkland RN  10/18/23 2828

## 2023-10-18 NOTE — CARE COORDINATION
MCG criteria for Delirium reviewed at this time, criteria supports Inpatient Admission.  MADDISON,RN/CM

## 2023-10-18 NOTE — ED NOTES
Medication History  [facility]    Patient Name: Stanislav Hernandez 1975     Medication history has been completed by: Dameon Bangura CPhT    Source(s) of information: patient supplied medications from home, insurance claims and retail pharmacy     Primary Care Physician: Maki Lebron     Pharmacy: [address] as of 10/18/2023 - Fully Reviewed 10/18/2023   Allergen Reaction Noted    Latex Other (See Comments) 10/30/2013    Morphine Hives 07/05/2015    Adhesive tape Other (See Comments) 09/23/2016    Aspirin Hives 10/30/2013    Codeine Hives 01/31/2012        Prior to Admission medications    Medication Sig Start Date End Date Taking? Authorizing Provider   gabapentin (NEURONTIN) 800 MG tablet Take 2.5 tablets by mouth nightly.  Takes 2 1/2 tablets at [facility]   Yes Parth Simms MD   vitamin D3 (CHOLECALCIFEROL) 10 MCG (400 UNIT) TABS tablet Take 1 tablet by mouth daily   Yes Parth Simms MD   OXcarbazepine (TRILEPTAL) 300 MG tablet Take 2 tablets by mouth every morning   Yes Parth Simms MD   OXcarbazepine (TRILEPTAL) 300 MG tablet Take 4 tablets by mouth nightly   Yes Parth Simms MD   zonisamide (ZONEGRAN) 100 MG capsule Take 1 capsule by mouth 2 times daily 9/26/23   Parth Simms MD   tiZANidine (ZANAFLEX) 4 MG tablet Take 2 tablets by mouth 2 times daily 9/13/23   Parth Simms MD   cyclobenzaprine (FLEXERIL) 10 MG tablet TAKE 1 OR 2 TABLETS BY MOUTH THREE TIMES A DAY AS NEEDED FOR NECK AND BACK PAIN 4/10/23   Parth Simms MD   VENTOLIN  (90 Base) MCG/ACT inhaler INHALE 2 PUFFS BY ORAL INHALATION EVERY 4 TO 6 HOURS AS NEEDED 1/14/23   Parth Simms MD   diclofenac sodium (VOLTAREN) 1 % GEL apply FOUR grams TO THE affected AREA UP TO FOUR TIMES DAILY 7/14/22   Parth Simms MD   PREMARIN 0.625 MG tablet Take 1 tablet by mouth daily 7/28/22   Parth Simms MD   gabapentin (NEURONTIN) 800

## 2023-10-18 NOTE — ED PROVIDER NOTES
are negative. BNP is not suggestive of volume overload. CT head is nonacute. CT chest is nonacute. Procalcitonin is not suggestive of sepsis. I did trial additional 4 mg of IV Narcan here in the emergency department with no significant improvement. Patient's drug screen is positive for benzos and on my secondary recheck she is able to tell me she took a Valium of another one of her family members. Patient is also positive for cocaine and THC and is on several sedative hypnotic medications and I suspect polysubstance use is at play causing patient's mental status changes. Patient is with improving mental status. Patient continues to protect airway with stable vital signs on nasal cannula alone. Patient does require further time to metabolize her substances in her system. Case discussed with hospitalist team and patient admitted to medicine. History from : Patient, Family  , and EMS    Limitations to history : Altered Mental Status    Patient was given the following medications:  Medications   naloxone (NARCAN) injection 4 mg (4 mg IntraVENous Given 10/18/23 1050)   dextrose 50 % IV solution (25 g IntraVENous Given 10/18/23 1103)   iopamidol (ISOVUE-370) 76 % injection 80 mL (80 mLs IntraVENous Given 10/18/23 1143)       Independent Imaging Interpretation by me: CT head without ICH per my read    Chronic conditions affecting care: polysubstance abuse, chronic back pain    Discussion with Other Profesionals : Admitting Team (hospitalist)    Social Determinants : None    Records Reviewed : Other [facility] report noting monthly Percocet 10 prescriptions. Additionally, no prescription for benzodiazepines noted and patient is positive for this and reports Valium intake at home. Disposition Considerations (tests considered but not done, Shared Decision Making, Pt Expectation of Test or Tx.):   Patient admitted.   I discussed specific signs and symptoms on when to return to the emergency department as well

## 2023-10-18 NOTE — ED NOTES
Pt at nurses station screaming that she wants to leave and that she is being held hostage. This rn assisted pt back to room assess the situation. Pt yelling that she wants to go home. Security called and at bedside. Pt stated she wanted her IV out and as this rn was getting supplies and gloves to take it out, she ripped it out herself. Pt handed gauzes and a band aid to control the bleeding and to cover it up. Explained to pt she is admitted and why. Pt refuses to stay and given AMA paper. Hospitalist paged to talk to pt and pts primary nurse Adal Gonzalez aware.       Dagoberto Braden, LAURIE  10/18/23 4318

## 2024-03-06 ENCOUNTER — PROCEDURE VISIT (OUTPATIENT)
Dept: NEUROLOGY | Age: 49
End: 2024-03-06
Payer: COMMERCIAL

## 2024-03-06 DIAGNOSIS — M54.12 CERVICAL RADICULOPATHY: Primary | ICD-10-CM

## 2024-03-06 PROCEDURE — 95886 MUSC TEST DONE W/N TEST COMP: CPT | Performed by: STUDENT IN AN ORGANIZED HEALTH CARE EDUCATION/TRAINING PROGRAM

## 2024-03-06 PROCEDURE — 95912 NRV CNDJ TEST 11-12 STUDIES: CPT | Performed by: STUDENT IN AN ORGANIZED HEALTH CARE EDUCATION/TRAINING PROGRAM

## 2024-03-06 NOTE — PROGRESS NOTES
EMG Upper Limbs:   EMG/NCS UPPER EXTREMITIES:    Reason for referral/Clinical data:    Emily is being seen today for bilateral upper extremity EMG to assess numbness and weakness in her hands bilaterally.  She does endorse neck pain.  Denies radiation of pain from the neck down into the arms.  She reports that she had ulnar release in 2016 and in 2023.    Refer to the Electrodiagnostic Data Sheet for normative values, specific techniques utilized for conductions, the numeric values obtained on nerve conductions, and specific muscles sampled for needle examination.   Informed consent was given by the patient after discussion of the following - Expected potential benefits of procedure include the following: identifying diagnoses, excluding diagnoses, and helping the treating practitioners to prescribe treatment, testing, and follow-up. Possible adverse events of electrodiagnostic testing include local discomfort, mild bleeding or bruising (common) and rare/uncommon events such as infection, nausea, fainting, or other idiosyncratic adverse events.    Motor studies:  -- Right median motor response demonstrates amplitude which is normal, distal latency which is normal, and conduction velocity which is normal.  -- Left median motor response demonstrates amplitude which is normal, distal latency which is normal, and conduction velocity which is normal.  -- Right ulnar motor response demonstrates amplitude which is normal, distal latency which is normal, and conduction velocity which is normal in both forearm and elbow segments.   Ren-Belén anastamosis findings are not identified.  -- Left ulnar motor response demonstrates amplitude which is normal, distal latency which is normal, and conduction velocity which is normal in both forearm and elbow segments.   Ren-Belén anastamosis findings are not identified.    --Axillary motor response tested at the deltoid is symmetric for amplitude and distal latency

## 2024-03-18 ENCOUNTER — APPOINTMENT (OUTPATIENT)
Dept: CT IMAGING | Age: 49
End: 2024-03-18
Payer: COMMERCIAL

## 2024-03-18 ENCOUNTER — HOSPITAL ENCOUNTER (EMERGENCY)
Age: 49
Discharge: HOME | End: 2024-03-18
Attending: EMERGENCY MEDICINE
Payer: COMMERCIAL

## 2024-03-18 ENCOUNTER — APPOINTMENT (OUTPATIENT)
Dept: GENERAL RADIOLOGY | Age: 49
End: 2024-03-18
Payer: COMMERCIAL

## 2024-03-18 VITALS
DIASTOLIC BLOOD PRESSURE: 88 MMHG | HEIGHT: 68 IN | TEMPERATURE: 97.8 F | SYSTOLIC BLOOD PRESSURE: 136 MMHG | BODY MASS INDEX: 30.31 KG/M2 | OXYGEN SATURATION: 99 % | WEIGHT: 200 LBS | RESPIRATION RATE: 16 BRPM | HEART RATE: 72 BPM

## 2024-03-18 DIAGNOSIS — F19.10 DRUG ABUSE (HCC): Primary | ICD-10-CM

## 2024-03-18 LAB
ACETAMINOPHEN LEVEL: <5 UG/ML (ref 15–30)
ALBUMIN SERPL-MCNC: 4 GM/DL (ref 3.4–5)
ALCOHOL SCREEN SERUM: <0.01 %WT/VOL
ALP BLD-CCNC: 101 IU/L (ref 40–129)
ALT SERPL-CCNC: 11 U/L (ref 10–40)
AMPHETAMINES: NEGATIVE
ANION GAP SERPL CALCULATED.3IONS-SCNC: 9 MMOL/L (ref 7–16)
AST SERPL-CCNC: 14 IU/L (ref 15–37)
BACTERIA: NEGATIVE /HPF
BARBITURATE SCREEN URINE: NEGATIVE
BASE EXCESS: 0 (ref 0–3)
BASOPHILS ABSOLUTE: 0 K/CU MM
BASOPHILS RELATIVE PERCENT: 0.4 % (ref 0–1)
BENZODIAZEPINE SCREEN, URINE: NEGATIVE
BILIRUB SERPL-MCNC: 0.1 MG/DL (ref 0–1)
BILIRUBIN URINE: NEGATIVE MG/DL
BLOOD, URINE: ABNORMAL
BUN SERPL-MCNC: 11 MG/DL (ref 6–23)
CALCIUM SERPL-MCNC: 8.8 MG/DL (ref 8.3–10.6)
CANNABINOID SCREEN URINE: NEGATIVE
CHLORIDE BLD-SCNC: 107 MMOL/L (ref 99–110)
CLARITY: CLEAR
CO2: 23 MMOL/L (ref 21–32)
COCAINE METABOLITE: ABNORMAL
COLOR: YELLOW
COMMENT: ABNORMAL
CREAT SERPL-MCNC: 0.7 MG/DL (ref 0.6–1.1)
DIFFERENTIAL TYPE: ABNORMAL
DOSE AMOUNT: ABNORMAL
DOSE AMOUNT: ABNORMAL
DOSE TIME: ABNORMAL
DOSE TIME: ABNORMAL
EKG ATRIAL RATE: 75 BPM
EKG DIAGNOSIS: NORMAL
EKG P AXIS: 49 DEGREES
EKG P-R INTERVAL: 136 MS
EKG Q-T INTERVAL: 418 MS
EKG QRS DURATION: 92 MS
EKG QTC CALCULATION (BAZETT): 466 MS
EKG R AXIS: -32 DEGREES
EKG T AXIS: 33 DEGREES
EKG VENTRICULAR RATE: 75 BPM
EOSINOPHILS ABSOLUTE: 0.1 K/CU MM
EOSINOPHILS RELATIVE PERCENT: 1.9 % (ref 0–3)
FENTANYL URINE: NEGATIVE
GFR SERPL CREATININE-BSD FRML MDRD: >60 ML/MIN/1.73M2
GLUCOSE BLD-MCNC: 89 MG/DL (ref 70–99)
GLUCOSE SERPL-MCNC: 83 MG/DL (ref 70–99)
GLUCOSE, URINE: NEGATIVE MG/DL
GONADOTROPIN, CHORIONIC (HCG) QUANT: 2.6 UIU/ML
HCO3 VENOUS: 24 MMOL/L (ref 22–29)
HCT VFR BLD CALC: 37 % (ref 37–47)
HEMOGLOBIN: 12.5 GM/DL (ref 12.5–16)
IMMATURE NEUTROPHIL %: 0.2 % (ref 0–0.43)
KETONES, URINE: NEGATIVE MG/DL
LEUKOCYTE ESTERASE, URINE: NEGATIVE
LYMPHOCYTES ABSOLUTE: 2.2 K/CU MM
LYMPHOCYTES RELATIVE PERCENT: 41.2 % (ref 24–44)
MCH RBC QN AUTO: 31.7 PG (ref 27–31)
MCHC RBC AUTO-ENTMCNC: 33.8 % (ref 32–36)
MCV RBC AUTO: 93.9 FL (ref 78–100)
MONOCYTES ABSOLUTE: 0.5 K/CU MM
MONOCYTES RELATIVE PERCENT: 9.6 % (ref 0–4)
MUCUS: ABNORMAL HPF
NITRITE URINE, QUANTITATIVE: NEGATIVE
NON SQUAM EPI CELLS: <1 /HPF
NUCLEATED RBC %: 0 %
O2 SAT, VEN: 92.7 % (ref 50–70)
OPIATES, URINE: NEGATIVE
OXYCODONE: NEGATIVE
PCO2, VEN: 37 MMHG (ref 41–51)
PDW BLD-RTO: 11.8 % (ref 11.7–14.9)
PH VENOUS: 7.42 (ref 7.32–7.43)
PH, URINE: 7 (ref 5–8)
PLATELET # BLD: 241 K/CU MM (ref 140–440)
PMV BLD AUTO: 9.7 FL (ref 7.5–11.1)
PO2, VEN: 179 MMHG (ref 28–48)
POTASSIUM SERPL-SCNC: 3.3 MMOL/L (ref 3.5–5.1)
PRO-BNP: 42.63 PG/ML
PROTEIN UA: NEGATIVE MG/DL
RBC # BLD: 3.94 M/CU MM (ref 4.2–5.4)
RBC URINE: 8 /HPF (ref 0–6)
SALICYLATE LEVEL: <0.3 MG/DL (ref 15–30)
SARS-COV-2 RDRP RESP QL NAA+PROBE: NOT DETECTED
SEGMENTED NEUTROPHILS ABSOLUTE COUNT: 2.5 K/CU MM
SEGMENTED NEUTROPHILS RELATIVE PERCENT: 46.7 % (ref 36–66)
SODIUM BLD-SCNC: 139 MMOL/L (ref 135–145)
SOURCE: NORMAL
SPECIFIC GRAVITY UA: 1.02 (ref 1–1.03)
SQUAMOUS EPITHELIAL: 2 /HPF
TOTAL IMMATURE NEUTOROPHIL: 0.01 K/CU MM
TOTAL NUCLEATED RBC: 0 K/CU MM
TOTAL PROTEIN: 6.7 GM/DL (ref 6.4–8.2)
TRICHOMONAS: ABNORMAL /HPF
TROPONIN, HIGH SENSITIVITY: 9 NG/L (ref 0–14)
TSH SERPL DL<=0.005 MIU/L-ACNC: 0.7 UIU/ML (ref 0.27–4.2)
UROBILINOGEN, URINE: 1 MG/DL (ref 0.2–1)
WBC # BLD: 5.3 K/CU MM (ref 4–10.5)
WBC UA: 1 /HPF (ref 0–5)
YEAST: ABNORMAL /HPF

## 2024-03-18 PROCEDURE — 85025 COMPLETE CBC W/AUTO DIFF WBC: CPT

## 2024-03-18 PROCEDURE — 99285 EMERGENCY DEPT VISIT HI MDM: CPT

## 2024-03-18 PROCEDURE — G0480 DRUG TEST DEF 1-7 CLASSES: HCPCS

## 2024-03-18 PROCEDURE — 84702 CHORIONIC GONADOTROPIN TEST: CPT

## 2024-03-18 PROCEDURE — 84484 ASSAY OF TROPONIN QUANT: CPT

## 2024-03-18 PROCEDURE — 80307 DRUG TEST PRSMV CHEM ANLYZR: CPT

## 2024-03-18 PROCEDURE — 83880 ASSAY OF NATRIURETIC PEPTIDE: CPT

## 2024-03-18 PROCEDURE — 87635 SARS-COV-2 COVID-19 AMP PRB: CPT

## 2024-03-18 PROCEDURE — 70450 CT HEAD/BRAIN W/O DYE: CPT

## 2024-03-18 PROCEDURE — 93010 ELECTROCARDIOGRAM REPORT: CPT | Performed by: INTERNAL MEDICINE

## 2024-03-18 PROCEDURE — 82805 BLOOD GASES W/O2 SATURATION: CPT

## 2024-03-18 PROCEDURE — 81001 URINALYSIS AUTO W/SCOPE: CPT

## 2024-03-18 PROCEDURE — 82962 GLUCOSE BLOOD TEST: CPT

## 2024-03-18 PROCEDURE — 84443 ASSAY THYROID STIM HORMONE: CPT

## 2024-03-18 PROCEDURE — 80053 COMPREHEN METABOLIC PANEL: CPT

## 2024-03-18 PROCEDURE — 71045 X-RAY EXAM CHEST 1 VIEW: CPT

## 2024-03-18 PROCEDURE — 93005 ELECTROCARDIOGRAM TRACING: CPT | Performed by: EMERGENCY MEDICINE

## 2024-03-18 NOTE — ED NOTES
[facility] CRISIS ASSESSMENT    Chief Complaint:    reports uncontrollable crying and screaming        Provisional Diagnosis: drug abuse       Risk, Psychosocial and Contextual Factors: (homeless, lack of social support etc.):  medical issues with her hands and feet      Current MH Treatment: None reported         Present Suicidal Behavior:  Patient denies attempting to harm herself     Verbal:  Patient reports no self-harm     Attempt:  Denies       Access to Weapons:   Denies       C-SSRS Current Suicide Risk: Low, Moderate or High:    Low risk       Past Suicidal Behavior:   Denies past SI/HI    Verbal:  Denies     Attempts:   Denies       Self-Injurious/Self-Mutilation: (Specify)   Denies       Traumatic Event Within Past 2 Weeks: (Specify)  Patient reports no triggering event.      Current Abuse:  (Specify)  Patient denies abuse       Legal: (Specify)   Denies       Violence: (Specify)   Denies       Protective Factors:  Family       Housing:   Lives in home with her        Clinical Summary:  Emily is a 49 y/o MBF who presented to ED by  who reported uncontrollable crying. Patient was cooperative and oriented x4. Patient reports that she took her medication plus cocaine last night. Patient reports that she does not have SI/HI. Patient reports that her hands and feet hurt all the time and that she self-medicates. Patient reports that her appetite and sleeping habits are good.  and Patient reports feeling safe at home. Patient also shared that she feels supported by her  and children.     Patient denies SI/HI  Patient denies AH/VH  Patient denies self-harm       Level of Care Disposition:      Consulted with medical provider. Patient is medically stabilized.  Consulted with patients RN about abnormalities or medical concerns. No abnormalities or medical concerns noted.  Consulted with Dayanna Naranjo. Patient does not need further evaluation or tx. Patient can be discharged once medically

## 2024-03-18 NOTE — DISCHARGE INSTRUCTIONS
Please do not use drugs or alcohol  Please consult with your primary care physician or mental health specialist  Return to ER as needed
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
## 2024-03-18 NOTE — ED NOTES
Pt up for D/C and given belongings. The pt started to get dressed and the sitter offered to take her to the bathroom because the pt complained about getting dressed with the door open. Pt refused to go to the bathroom and instead slammed the door to her room. Pt door was reopened by an RN and pt then yelled,\"Who is opening my fucking door?\" And pointed to the sitter and said, \"Oh this bitch thinks it's funny.\" While the sitter was giving report to the night shift sitter. The pt then slammed the door again, hitting the WOW that was sitting by the door. The charge nurse walked to the room and cracked it to make sure everything was ok and the pt was yelling about her privacy being violated. The charge nurse allowed her to finish getting dressed with the door cracked.

## 2024-03-18 NOTE — ED NOTES
Dixon He Jr, pt's , states, \"Don't let her walk home when they release her, please call me and I will come pick her up.\"

## 2024-03-18 NOTE — ED NOTES
Asked pt if she does any kind of drugs, pt states \"just a temperature.\" When asked pt again if she does any street drugs, pt closes her eyes & doesn't answer any questions at this time.

## 2024-03-18 NOTE — ED PROVIDER NOTES
This patient care was signed out to me by Dr. Rubin at the end of his shift.  Patient came in after taking cocaine not feeling well.  She was crying and screaming.  She did not admit to me or to Dr. Rubin for suicidal homicidal thoughts or plans.  She was pink slipped by the police.  However patient now alert oriented x 3 and does not appear to be in distress.  She is coherent and her thoughts and is able to express herself understandably.  Labs positive for cocaine patient's other labs are unremarkable.  Patient wants to go home.  Therefore patient is discharged home as she is at low risk for self injury or being harmful to the community alert     Bill Alan MD  03/18/24 1910

## 2024-03-18 NOTE — ED NOTES
Pt called this RN to the room and states,\"I'm ready to go home.\" This RN asked her if she knows why she is here. Pt states,\"I was acting abnormal.\" When asked what caused her to do that she stated,\"Cocaine.\" Pt denies HI and SI and wants to leave. Dr Alan notified.

## 2024-03-18 NOTE — ED TRIAGE NOTES
called EMS for uncontrolled screaming & crying. Pt not crying @ this time, but pt is not answering questions when this RN is asking questions.

## 2024-03-18 NOTE — ED PROVIDER NOTES
[facility]      TRIAGE CHIEF COMPLAINT:   Mental Health Problem (Ems called by  for uncontrolled screaming & crying. Pt not answering questions at this time)      Koi:  Emily He is a 48 y.o. female that presents by EMS for complaint of altered mental status.  Apparently patient was having uncontrolled screaming and crying,  called for help.  Apparently she has a history of cocaine use substance abuse from a mental health disorder she has been here before for similar episodes.  Patient brought in and upon arrival patient is refusing to talk to me she is moaning at times she is moving all extremities occasionally she will say her name etc.  No obvious signs of trauma initially no family at bedside.  Patient's with all extremities but would not give HPI.  Workup initiated    REVIEW OF SYSTEMS:    Review of Systems   Unable to perform ROS: Mental status change   Psychiatric/Behavioral:  Positive for behavioral problems, confusion and dysphoric mood. The patient is nervous/anxious and is hyperactive.        Past Medical History:   Diagnosis Date    Anemia     Anxiety     Arthritis     Bursitis     Chronic back pain     \"pain since \"\"have sciatica    Incisional hernia, without obstruction or gangrene 2016    Migraines     infrequent    Psoriasis      Past Surgical History:   Procedure Laterality Date    BACK SURGERY  13    L3,4,5, S1- have tit. cage     SECTION      per old chart     ENDOMETRIAL ABLATION      per old chart had thermachoice endo ablation and D & C done2009    HYSTERECTOMY (CERVIX STATUS UNKNOWN)      per old chart 2010-robotic LAVH with right S & O    PELVIC LAPAROSCOPY      per old chart 2008- d&C,laproscopcy,lysis of adhesion and removal cyst left ovary/ then 2009 had diag lap with laser, lysis of adhesions, left S & O and removal cyst right ovary    SHOULDER SURGERY Right     TONSILLECTOMY      per old chart T&A
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
## 2024-04-11 ENCOUNTER — TELEPHONE (OUTPATIENT)
Dept: NEUROLOGY | Age: 49
End: 2024-04-11

## 2024-04-11 NOTE — TELEPHONE ENCOUNTER
Received call from pt requesting EMG report be faxed to [facility] fax#  [phone] claim# [id number]. Faxed per request. Also provided pt with medical records contact info for her  to receive her test results.

## 2024-04-26 ENCOUNTER — TELEPHONE (OUTPATIENT)
Dept: NEUROLOGY | Age: 49
End: 2024-04-26

## 2024-04-26 NOTE — TELEPHONE ENCOUNTER
Received call from Pt requesting medical records. Records were printed and Pt was told to provide ID at time of pickup.

## 2024-07-01 ENCOUNTER — TRANSCRIBE ORDERS (OUTPATIENT)
Dept: ADMINISTRATIVE | Age: 49
End: 2024-07-01

## 2024-07-01 DIAGNOSIS — Z12.31 ENCOUNTER FOR SCREENING MAMMOGRAM FOR MALIGNANT NEOPLASM OF BREAST: Primary | ICD-10-CM

## 2024-07-09 ENCOUNTER — OFFICE VISIT (OUTPATIENT)
Dept: NEUROLOGY | Age: 49
End: 2024-07-09
Payer: COMMERCIAL

## 2024-07-09 VITALS
SYSTOLIC BLOOD PRESSURE: 114 MMHG | OXYGEN SATURATION: 95 % | HEART RATE: 72 BPM | WEIGHT: 177.8 LBS | DIASTOLIC BLOOD PRESSURE: 76 MMHG | BODY MASS INDEX: 27.03 KG/M2

## 2024-07-09 DIAGNOSIS — M79.2 NEUROPATHIC PAIN: ICD-10-CM

## 2024-07-09 DIAGNOSIS — R25.2 SPASM: ICD-10-CM

## 2024-07-09 DIAGNOSIS — M54.12 CERVICAL RADICULOPATHY: Primary | ICD-10-CM

## 2024-07-09 DIAGNOSIS — G43.009 MIGRAINE WITHOUT AURA, NOT INTRACTABLE, WITHOUT STATUS MIGRAINOSUS: ICD-10-CM

## 2024-07-09 PROCEDURE — 99205 OFFICE O/P NEW HI 60 MIN: CPT | Performed by: PSYCHIATRY & NEUROLOGY

## 2024-07-09 RX ORDER — GABAPENTIN 800 MG/1
TABLET ORAL
Qty: 120 TABLET | Refills: 5 | Status: SHIPPED | OUTPATIENT
Start: 2024-07-09 | End: 2025-01-05

## 2024-07-09 RX ORDER — ZONISAMIDE 100 MG/1
200 CAPSULE ORAL DAILY
Qty: 60 CAPSULE | Refills: 5 | Status: SHIPPED | OUTPATIENT
Start: 2024-07-09 | End: 2025-01-05

## 2024-07-09 RX ORDER — BACLOFEN 10 MG/1
20 TABLET ORAL 2 TIMES DAILY
Qty: 120 TABLET | Refills: 5 | Status: SHIPPED | OUTPATIENT
Start: 2024-07-09 | End: 2024-07-09 | Stop reason: DRUGHIGH

## 2024-07-09 RX ORDER — OXCARBAZEPINE 300 MG/1
600 TABLET, FILM COATED ORAL 2 TIMES DAILY
Qty: 120 TABLET | Refills: 5 | Status: SHIPPED | OUTPATIENT
Start: 2024-07-09 | End: 2025-01-05

## 2024-07-09 RX ORDER — BACLOFEN 20 MG/1
40 TABLET ORAL 2 TIMES DAILY
Qty: 120 TABLET | Refills: 5 | Status: SHIPPED | OUTPATIENT
Start: 2024-07-09 | End: 2025-01-05

## 2024-07-09 NOTE — PROGRESS NOTES
7/9/24    Emily He  1975    Chief Complaint   Patient presents with    New Patient     M25.512 (ICD-10-CM) - Pain in left shoulder  G89.29 (ICD-10-CM) - Other chronic pain           History of Present Illness  Emily is a 48 y.o. female presenting today for evaluation of:      Emily states she was previously following with Dr. Mesa before he moved his practice from the area.  She experiences nerve pain related to cervical radiculopathy and peripheral neuropathy.  She tells me that she has nerve pain coming down from the left side of her neck into her shoulder and underarm and forearm.  She also has a history of bilateral ulnar neuropathy and has had ulnar nerve surgery twice on each elbow.  She was told she has a mild degree of carpal tunnel syndrome she has some numbness on her palm on the right hand.  She does take oxcarbazepine 600 mg twice daily for neuropathic pain.  She was prescribed a dose of 1200 mg at bedtime but states she felt like that was may be too high.  She has never had issues with low sodium.  She is on zonisamide 100 mg twice daily for headache prophylaxis.  She tells me that her migraines are under good control.  In addition to the oxcarbazepine she is also on gabapentin for neuropathic pain for which she takes 800 mg in the morning and 100 mg in the afternoon and 600 mg in the evening.  She is on baclofen 40 mg twice daily for muscle spasms.  She is also on duloxetine which was given to her for anxiety and nerve pain.  She states she has been out of the gabapentin, duloxetine, and oxcarbazepine for about 3 months.  Likely she has not had any worsening of her headaches.  Her nerve pain has been worse off the gabapentin and baclofen but she has still had some oxcarbazepine.  She had EMG performed in March 2024 which did demonstrate bilateral C7-C8 cervical radiculopathy.  There was not any mononeuropathy described on that EMG.      Subjective    Review of

## 2024-07-10 ENCOUNTER — TELEPHONE (OUTPATIENT)
Dept: NEUROLOGY | Age: 49
End: 2024-07-10

## 2024-08-14 ENCOUNTER — HOSPITAL ENCOUNTER (OUTPATIENT)
Dept: WOMENS IMAGING | Age: 49
Discharge: HOME | End: 2024-08-14
Payer: COMMERCIAL

## 2024-08-14 ENCOUNTER — TELEPHONE (OUTPATIENT)
Dept: NEUROLOGY | Age: 49
End: 2024-08-14

## 2024-08-14 VITALS — BODY MASS INDEX: 28.04 KG/M2 | WEIGHT: 185 LBS | HEIGHT: 68 IN

## 2024-08-14 DIAGNOSIS — Z12.31 ENCOUNTER FOR SCREENING MAMMOGRAM FOR BREAST CANCER: ICD-10-CM

## 2024-08-14 DIAGNOSIS — Z12.31 ENCOUNTER FOR SCREENING MAMMOGRAM FOR MALIGNANT NEOPLASM OF BREAST: ICD-10-CM

## 2024-08-14 DIAGNOSIS — M54.12 CERVICAL RADICULOPATHY: Primary | ICD-10-CM

## 2024-08-14 PROCEDURE — 77063 BREAST TOMOSYNTHESIS BI: CPT

## 2024-08-14 NOTE — TELEPHONE ENCOUNTER
Patient called said she can not have MRI due to spine stimulator and is needing new order for CT which she was advised is medically safe for her to have

## 2024-08-30 ENCOUNTER — HOSPITAL ENCOUNTER (OUTPATIENT)
Dept: CT IMAGING | Age: 49
Discharge: HOME | End: 2024-08-30
Attending: PSYCHIATRY & NEUROLOGY
Payer: COMMERCIAL

## 2024-08-30 DIAGNOSIS — M54.12 CERVICAL RADICULOPATHY: ICD-10-CM

## 2024-08-30 PROCEDURE — 72125 CT NECK SPINE W/O DYE: CPT

## 2024-10-03 ENCOUNTER — OFFICE VISIT (OUTPATIENT)
Dept: NEUROLOGY | Age: 49
End: 2024-10-03
Payer: COMMERCIAL

## 2024-10-03 VITALS
OXYGEN SATURATION: 96 % | BODY MASS INDEX: 24.69 KG/M2 | HEART RATE: 81 BPM | DIASTOLIC BLOOD PRESSURE: 78 MMHG | WEIGHT: 162.4 LBS | SYSTOLIC BLOOD PRESSURE: 110 MMHG

## 2024-10-03 DIAGNOSIS — M54.2 NECK PAIN: Primary | ICD-10-CM

## 2024-10-03 PROCEDURE — G8427 DOCREV CUR MEDS BY ELIG CLIN: HCPCS | Performed by: PSYCHIATRY & NEUROLOGY

## 2024-10-03 PROCEDURE — G8484 FLU IMMUNIZE NO ADMIN: HCPCS | Performed by: PSYCHIATRY & NEUROLOGY

## 2024-10-03 PROCEDURE — 99214 OFFICE O/P EST MOD 30 MIN: CPT | Performed by: PSYCHIATRY & NEUROLOGY

## 2024-10-03 PROCEDURE — G8420 CALC BMI NORM PARAMETERS: HCPCS | Performed by: PSYCHIATRY & NEUROLOGY

## 2024-10-03 PROCEDURE — 4004F PT TOBACCO SCREEN RCVD TLK: CPT | Performed by: PSYCHIATRY & NEUROLOGY

## 2024-10-03 RX ORDER — HYDROXYZINE PAMOATE 50 MG/1
50 CAPSULE ORAL 4 TIMES DAILY PRN
COMMUNITY
Start: 2024-09-12

## 2024-10-03 RX ORDER — DULOXETIN HYDROCHLORIDE 30 MG/1
30 CAPSULE, DELAYED RELEASE ORAL 2 TIMES DAILY
COMMUNITY

## 2024-10-03 NOTE — PROGRESS NOTES
throughout with the exception of some hands intrinsic weakness bilaterally   Coordination: normal finger-to-nose, forearm rotation intact, and rapid alternating movement normal  Gait and Stance   Gait/Posture: station normal, casual gait normal, and ambulates independently   DTRs: 1/4 brachioradialis and patellar bilaterally, trace triceps bilaterally, 0/4 biceps bilaterally      /78 (Site: Right Upper Arm, Position: Sitting)   Pulse 81   Wt 73.7 kg (162 lb 6.4 oz)   SpO2 96%   BMI 24.69 kg/m²     Assessment and Plan     Diagnosis Orders   1. Neck pain  Ambulatory referral to Physical Therapy          Emily and ENEIDA discussed multiple aspects of her care and agreed upon the following plan:  -- Continue gabapentin 800 mg in the morning, 800 mg in the afternoon, and 600 mg in the evening for neuropathic pain.  -- Continue oxcarbazepine 600 mg twice daily for neuropathic pain.  -- Continue zonisamide 200 mg at bedtime for migraine prophylaxis  -- Continue baclofen 40 mg twice daily for muscle spasms.  -- She was restarted on duloxetine 30 mg twice daily by her psychiatrist for mood disorder.  -- She will continue to follow-up with Dr. Vaughn, orthopedic spine surgery, for any surgical recommendations regarding her degenerative disc disease.  -- She has not done any physical therapy for neck pain and is interested in doing so.  I think that is a good idea and have placed a referral at this time.      I spent >30 minutes total time regarding this patient's encounter.  This included obtaining history, performing a neurological medical exam, developing an assessment / plan, and documenting via EMR.      Return in about 4 months (around 2/3/2025) for Follow-up PA/NP.    Stew Wilson DO

## 2024-10-09 NOTE — PROGRESS NOTES
IR Procedure at [facility]:  Left message for patient to arrive 1130 at [facility] on 10/11/2024 for her procedure at 1300, also went over below instructions and told patient to take her medications as scheduled and that she will need a  that day.    NPO at Midnight      Follow your directions as prescribed by the doctor for your procedure and medications.  3.   Consult your provider as to when to stop blood thinner  4.   Do not take any pain medication within 6 hours of your procedure  5.   Do not drink any alcoholic beverages or use any street drugs 24 hours before procedure.  6.   Please wear simple, loose fitting clothing to the hospital.  Do not bring valuables (money,             credit cards, checkbooks, etc.)     7.   If you  have a Living Will and Durable Power of  for Healthcare, please bring in a copy.  8.   Please bring picture ID,  insurance card, paperwork from the doctors office            (H & P, Consent,  & card for implantable devices).  9.   Report to the information desk on the ground floor.  10. Take a shower the night before or morning of your procedure, do not apply any lotion, oil or powder.

## 2024-10-11 ENCOUNTER — HOSPITAL ENCOUNTER (OUTPATIENT)
Dept: GENERAL RADIOLOGY | Age: 49
Discharge: HOME | End: 2024-10-11
Payer: COMMERCIAL

## 2024-10-11 ENCOUNTER — HOSPITAL ENCOUNTER (OUTPATIENT)
Dept: CT IMAGING | Age: 49
Discharge: HOME | End: 2024-10-11
Payer: COMMERCIAL

## 2024-10-11 VITALS
HEIGHT: 68 IN | SYSTOLIC BLOOD PRESSURE: 162 MMHG | BODY MASS INDEX: 25.01 KG/M2 | HEART RATE: 59 BPM | TEMPERATURE: 97.6 F | DIASTOLIC BLOOD PRESSURE: 84 MMHG | RESPIRATION RATE: 16 BRPM | OXYGEN SATURATION: 100 % | WEIGHT: 165 LBS

## 2024-10-11 DIAGNOSIS — M54.10 RADICULOPATHY, UNSPECIFIED SPINAL REGION: ICD-10-CM

## 2024-10-11 DIAGNOSIS — M48.02 STENOSIS, CERVICAL SPINE: ICD-10-CM

## 2024-10-11 PROCEDURE — 72126 CT NECK SPINE W/DYE: CPT

## 2024-10-11 PROCEDURE — 62284 INJECTION FOR MYELOGRAM: CPT

## 2024-10-11 PROCEDURE — 6360000004 HC RX CONTRAST MEDICATION: Performed by: ORTHOPAEDIC SURGERY

## 2024-10-11 RX ORDER — IOPAMIDOL 612 MG/ML
15 INJECTION, SOLUTION INTRATHECAL
Status: COMPLETED | OUTPATIENT
Start: 2024-10-11 | End: 2024-10-11

## 2024-10-11 RX ADMIN — IOPAMIDOL 15 ML: 612 INJECTION, SOLUTION INTRATHECAL at 14:34

## 2024-10-11 ASSESSMENT — PAIN - FUNCTIONAL ASSESSMENT: PAIN_FUNCTIONAL_ASSESSMENT: 0-10

## 2024-10-11 NOTE — PROGRESS NOTES
1450 PATIENT RETURNED FROM RADIOLOGY ASSESSMENT WNL VSS CALL LIGHT IN REACH  1455 RADIOLOGY CALLED SPOKE WITH DR VERGARA,ORDERS RECEIVED  1500 PO FLUIDS GIVEN,PATIENT UPDATED ON POST OP ORDERS  1600 VSS ASSESSMENT WNL,BANDAID TO LOWER BACK D/I,PATIENT UP TO THE BATHROOM  GAIT STEADY,CALLING FAMILY FOR A RIDE

## 2024-10-16 ENCOUNTER — HOSPITAL ENCOUNTER (OUTPATIENT)
Dept: PHYSICAL THERAPY | Age: 49
Setting detail: THERAPIES SERIES
Discharge: HOME | End: 2024-10-16

## 2024-10-16 NOTE — PROGRESS NOTES
Physical Therapy  Cancellation/No-show Note  Patient Name:  Emily He  :  1975   Date:  10/16/2024  Cancelled visits to date: 1  No-shows to date: 0    For today's appointment patient:  [x]  Cancelled  []  Rescheduled appointment  []  No-show     Reason given by patient:  []  Patient ill  []  Conflicting appointment  []  No transportation    []  Conflict with work  []  No reason given  [x]  Other:     Comments:  rescheduled    Electronically signed by:  Andrea Walton PT,

## 2024-10-28 ENCOUNTER — HOSPITAL ENCOUNTER (OUTPATIENT)
Dept: PHYSICAL THERAPY | Age: 49
Setting detail: THERAPIES SERIES
Discharge: HOME | End: 2024-10-28
Payer: COMMERCIAL

## 2024-10-28 PROCEDURE — 97161 PT EVAL LOW COMPLEX 20 MIN: CPT

## 2024-10-28 PROCEDURE — 97110 THERAPEUTIC EXERCISES: CPT

## 2024-10-28 ASSESSMENT — PAIN DESCRIPTION - ORIENTATION: ORIENTATION: LEFT

## 2024-10-28 ASSESSMENT — PAIN DESCRIPTION - PAIN TYPE: TYPE: CHRONIC PAIN

## 2024-10-28 ASSESSMENT — PAIN DESCRIPTION - LOCATION: LOCATION: NECK;ARM;HAND

## 2024-10-28 NOTE — PLAN OF CARE
Outpatient Physical Therapy           [place]           [x] Phone: [phone]   Fax: [phone]  [place]           [] Phone: [phone]   Fax: [phone]     To: Stew Wilson DO     From: Andrea Walton, PT, DPT     Patient: Emily He       : 1975  Diagnosis: Neck pain [M54.2]    Treatment Diagnosis: cervical DDD and impringement L radiating symptoms. stiffness, hypomobility , posture  Date: 10/28/2024    Physical Therapy Certification/Re-Certification Form  Dear Stew Malone,   The following patient has been evaluated for physical therapy services and for therapy to continue, insurance requires physician review of the treatment plan initially and every 90 days. Please review the attached evaluation and/or summary of the patient's plan of care, and verify that you agree therapy should continue by signing the attached document and sending it back to our office.    Assessment:    Assessment: Pt appears with DDD in the cervical area and osteophytes, she has EMG noting compression of C7-8 nerve root L. she reports that Dr Vaughn stated no surgery was indicated. she is having neck pain and L UE to the hand pain and tingling / weakness. 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 She does take care of all her own ADL and household activity as well as caring for her grand kids. She is currently in the process of attaining disability as she has also have lumbar surgery.    Plan of Care/Treatment to date:  [x] Therapeutic Exercise  [x] Modalities: prn  [x] Therapeutic Activity     [] Ultrasound  [] Electrical Stimulation  [] Gait Training      [x] Cervical Traction [] Lumbar Traction  [x] Neuromuscular Re-education    [] Cold/hotpack [] Iontophoresis   [x] Instruction in HEP      [] Vasopneumatic    [] Dry Needling  [x] Manual Therapy               [] Aquatic Therapy       Other:          Frequency/Duration: pt has request 1 visit per week  #

## 2024-10-28 NOTE — FLOWSHEET NOTE
Outpatient Physical Therapy  [place]           [x] Phone: [phone]   Fax: [phone]  [place]           [] Phone: [phone]   Fax: [phone]        Physical Therapy Daily Treatment Note  Date:  10/28/2024    Patient Name:  Emily He    :  1975  MRN: [id number]  Restrictions/Precautions: No data recorded      Diagnosis:   Neck pain [M54.2]    Date of Injury/Surgery:   Treatment Diagnosis:  cervical DDD and impringement L radiating symptoms. 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 ind with HEP for postue and flexibility  2. reports 50% less L hand radiating symtoms  3. cervical ROM Rot R and L  40 deg  4.  50% less TTP in the L lower cervical area and UT     Long Term Goals  Time Frame for Long Term Goals: 6-8 weeks  1. report 75% better overall  in the neck and her L radiating symtoms with ADL and housework  2. demo good cervical posture  3. L  improved to 50 #  4. cervical ROM Ext 45 flex 55 with no increaed neck pan or L radiating symptoms  5. NDI omproved to 11 pts or less      Summary of Evaluation:  Assessment: Pt appears with DDD in the cervical area and osteophytes, she has EMG noting compression of C7-8 nerve root L. she reports that Dr Vaughn stated no surgery was indicated. she is having neck pain and L UE to the hand pain and tingling / weakness. 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 She does take care of all her own ADL and household activity as

## 2024-10-28 NOTE — PROGRESS NOTES
in the L lower cervical area and UT                                             Long Term Goals Completed by 6-8 weeks Goal Status   1. report 75% better overall  in the neck and her L radiating symtoms with ADL and housework     2. demo good cervical posture     3. L  improved to 50 #     4. cervical ROM Ext 45 flex 55 with no increaed neck pan or L radiating symptoms     5. NDI omproved to 11 pts or less                                        TREATMENT PLAN   PT Equipment Recommendations  Equipment Needed: No   Requires PT Follow-Up: Yes  Treatment Initiated : started on HEP  Specific Instructions for Next Treatment: progres as tolerated, posture, manual , strength, and ROM    Pt. actively involved in establishing Plan of Care and Goals: Yes  Patient/ Caregiver education and instruction: Goals, PT Role, Plan of Care, Evaluative findings, Insurance, Home Exercise Program, Posture             Treatment may include any combination of the following: Current Treatment Recommendations: Strengthening, ROM, Manual, Neuromuscular re-education, Home exercise program, Therapeutic activities     Frequency / Duration:  Patient to be seen 1-2 per week , pt request 1 per week for 6-8 weeks weeks      Eval Complexity: Overall Evaluation : Low  Decision Making: Low Complexity  History: Personal Factors and/or Comorbidities Impacting POC: Low  Examination of body system(s) including body structures and functions, activity limitations, and/or participation restrictions: Low  Clinical Presentation: Low    PT Treatment Completed:  Started HEP and posture edu    Therapy Time  Individual Time In:       1115  Individual Time Out:   1200  Minutes:   1 eval ( 35 )  1 TE ( 10  )        Therapist Signature: Andrea Walton, PT    Date: 10/28/2024     I certify that the above Therapy Services are being furnished while the patient is under my care. I agree with the treatment plan and certify that this therapy is necessary.

## 2024-11-04 ENCOUNTER — HOSPITAL ENCOUNTER (OUTPATIENT)
Dept: PHYSICAL THERAPY | Age: 49
Discharge: HOME | End: 2024-11-04

## 2024-11-04 NOTE — PROGRESS NOTES
Physical Therapy  Cancellation/No-show Note  Patient Name:  Emily He  :  1975   Date:  2024  Cancelled visits to date: 2  No-shows to date: 0    For today's appointment patient:  [x]  Cancelled  []  Rescheduled appointment  []  No-show     Reason given by patient:  []  Patient ill  []  Conflicting appointment  []  No transportation    []  Conflict with work  [x]  No reason given  []  Other:     Comments:     Electronically signed by:  Milly Chan PTA      2024,10:06 AM

## 2024-11-18 ENCOUNTER — HOSPITAL ENCOUNTER (OUTPATIENT)
Dept: PHYSICAL THERAPY | Age: 49
Setting detail: THERAPIES SERIES
Discharge: HOME | End: 2024-11-18
Payer: COMMERCIAL

## 2024-11-18 PROCEDURE — 97140 MANUAL THERAPY 1/> REGIONS: CPT

## 2024-11-18 PROCEDURE — 97110 THERAPEUTIC EXERCISES: CPT

## 2024-11-18 NOTE — FLOWSHEET NOTE
Outpatient Physical Therapy  [place]           [x] Phone: [phone]   Fax: [phone]  [place]           [] Phone: [phone]   Fax: [phone]        Physical Therapy Daily Treatment Note  Date:  2024    Patient Name:  Emily He    :  1975  MRN: [id number]  Restrictions/Precautions: No data recorded      Diagnosis:   Neck pain [M54.2]    Date of Injury/Surgery:   Treatment Diagnosis:  cervical DDD and impringement L radiating symptoms. 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 ind with HEP for postue and flexibility  2. reports 50% less L hand radiating symtoms  3. cervical ROM Rot R and L  40 deg  4.  50% less TTP in the L lower cervical area and UT  Long Term Goals  Time Frame for Long Term Goals: 6-8 weeks  1. report 75% better overall  in the neck and her L radiating symtoms with ADL and housework  2. demo good cervical posture  3. L  improved to 50 #  4. cervical ROM Ext 45 flex 55 with no increaed neck pan or L radiating symptoms  5. NDI omproved to 11 pts or less        Summary of Evaluation:  Assessment: Pt appears with DDD in the cervical area and osteophytes, she has EMG noting compression of C7-8 nerve root L. she reports that Dr Vaughn stated no surgery was indicated. she is having neck pain and L UE to the hand pain and tingling / weakness. 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 She does take care of all her own ADL and household activity as  122

## 2024-11-25 ENCOUNTER — HOSPITAL ENCOUNTER (OUTPATIENT)
Dept: PHYSICAL THERAPY | Age: 49
Setting detail: THERAPIES SERIES
Discharge: HOME | End: 2024-11-25
Payer: COMMERCIAL

## 2024-11-25 PROCEDURE — 97110 THERAPEUTIC EXERCISES: CPT

## 2024-11-25 PROCEDURE — 97140 MANUAL THERAPY 1/> REGIONS: CPT

## 2024-11-25 NOTE — PROGRESS NOTES
Outpatient Physical Therapy           [place]           [x] Phone: [phone]   Fax: [phone]  [place]           [] Phone: [phone]   Fax: [phone]      To: Stew Wilson, DO     From: Andrea Walton, PT, DPT     Patient: Emily He                    : 1975  Diagnosis:  Neck pain [M54.2]        Treatment Diagnosis:     cervical DDD and impringement L radiating symptoms. stiffness, hypomobility , posture   Date: 2024  [x]  Progress Note                []  Discharge Note    Evaluation Date:   10/28/24  Total Visits to date:  3  Cancels/No-shows to date:  3    Subjective:   pt states she is not feeling to bad today but notes she is using her pain cream and just took a hot shower. She notes that yesterday after cooking for a while the pain was 6/10, with more numbness and tingling in the L hand and forearm. She notes 25% better  and   therapy has helped some but she still get the symptoms. She will see pain mgt in mid Dec.       Plan of Care/Treatment to date:  [x] Therapeutic Exercise    [] Modalities:  [x] Therapeutic Activity     [] Ultrasound  [] Electrical Stimulation  [] Gait Training      [] Cervical Traction   [] Lumbar Traction  [x] Neuromuscular Re-education  [] Cold/hotpack [] Iontophoresis  [x] Instruction in HEP      Other:  [x] Manual Therapy       []  Vasopneumatic  [] Aquatic Therapy       []   Dry Needle Therapy                      Objective/Significant Findings At Last Visit/Comments:   ind with HEP,  25% less tingling in the R hand,  cervical Rot flex 41 ext  40 Rot  R 50 L 42 ,  25% less tender to palp in the L lower cervical area,  able to demo good posture int he clinic,  R 70# L 55# , NDI : 17 pts, able to demo good cervical posture in the clinic       Assessment:   Pt tolerated all well in the clinic today.  She  does appear to have made some progress toward goals and is tolerating  more activity better but till with increased neck and L UE

## 2024-11-25 NOTE — FLOWSHEET NOTE
Outpatient Physical Therapy  [place]           [x] Phone: [phone]   Fax: [phone]  [place]           [] Phone: [phone]   Fax: [phone]        Physical Therapy Daily Treatment Note  Date:  2024    Patient Name:  Emily He    :  1975  MRN: [id number]  Restrictions/Precautions: No data recorded      Diagnosis:   Neck pain [M54.2]    Date of Injury/Surgery:   Treatment Diagnosis:  cervical DDD and impringement L radiating symptoms. 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 ind with HEP for postue and flexibility  met  2. reports 50% less L hand radiating symptoms  not met  3. cervical ROM Rot R and L  40 deg  met  4.  50% less TTP in the L lower cervical area and UT  not met  Long Term Goals  Time Frame for Long Term Goals: 6-8 weeks  1. report 75% better overall  in the neck and her L radiating symtoms with ADL and housework  not met  2. demo good cervical posture  met  3. L  improved to 50 #  met  4. cervical ROM Ext 45 flex 55 with no increaed neck pan or L radiating symptoms  not met  5. NDI improved to 11 pts or less   not met        Summary of Evaluation:  Assessment: Pt appears with DDD in the cervical area and osteophytes, she has EMG noting compression of C7-8 nerve root L. she reports that Dr Vaughn stated no surgery was indicated. she is having neck pain and L UE to the hand pain and tingling / weakness. 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

## 2024-12-02 ENCOUNTER — HOSPITAL ENCOUNTER (OUTPATIENT)
Dept: PHYSICAL THERAPY | Age: 49
Setting detail: THERAPIES SERIES
Discharge: HOME | End: 2024-12-02
Payer: COMMERCIAL

## 2024-12-02 PROCEDURE — 97110 THERAPEUTIC EXERCISES: CPT

## 2024-12-02 PROCEDURE — 97140 MANUAL THERAPY 1/> REGIONS: CPT

## 2024-12-02 NOTE — FLOWSHEET NOTE
Outpatient Physical Therapy  [place]           [x] Phone: [phone]   Fax: [phone]  [place]           [] Phone: [phone]   Fax: [phone]        Physical Therapy Daily Treatment Note  Date:  2024    Patient Name:  Emily He    :  1975  MRN: [id number]  Restrictions/Precautions: No data recorded      Diagnosis:   Neck pain [M54.2]    Date of Injury/Surgery:   Treatment Diagnosis:  cervical DDD and impringement L radiating symptoms. stiffness, hypomobility , posture  Insurance/Certification information: johann ( 30 PCY) - pre-cert after visit #8?  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 ind with HEP for postue and flexibility  met  2. reports 50% less L hand radiating symptoms  not met  3. cervical ROM Rot R and L  40 deg  met  4.  50% less TTP in the L lower cervical area and UT  not met  Long Term Goals  Time Frame for Long Term Goals: 6-8 weeks  1. report 75% better overall  in the neck and her L radiating symtoms with ADL and housework  not met  2. demo good cervical posture  met  3. L  improved to 50 #  met  4. cervical ROM Ext 45 flex 55 with no increaed neck pan or L radiating symptoms  not met  5. NDI improved to 11 pts or less   not met        Summary of Evaluation:  Assessment: Pt appears with DDD in the cervical area and osteophytes, she has EMG noting compression of C7-8 nerve root L. she reports that Dr Vaughn stated no surgery was indicated. she is having neck pain and L UE to the hand pain and tingling / weakness. 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

## 2024-12-03 ENCOUNTER — TELEPHONE (OUTPATIENT)
Dept: NEUROLOGY | Age: 49
End: 2024-12-03

## 2024-12-05 ENCOUNTER — HOSPITAL ENCOUNTER (OUTPATIENT)
Dept: PHYSICAL THERAPY | Age: 49
Discharge: HOME | End: 2024-12-05

## 2024-12-05 NOTE — PROGRESS NOTES
Physical Therapy  Cancellation/No-show Note  Patient Name:  Emily He  :  1975   Date:  2024  Cancelled visits to date: 3  No-shows to date:1    For today's appointment patient:  [x]  Cancelled  []  Rescheduled appointment  []  No-show     Reason given by patient:  []  Patient ill  []  Conflicting appointment  []  No transportation    []  Conflict with work  [x]  No reason given  []  Other:     Comments:     Electronically signed by:  Ani Frost PTA        2024,8:43 AM

## 2024-12-11 ENCOUNTER — HOSPITAL ENCOUNTER (OUTPATIENT)
Dept: PHYSICAL THERAPY | Age: 49
Setting detail: THERAPIES SERIES
Discharge: HOME | End: 2024-12-11
Payer: COMMERCIAL

## 2024-12-11 PROCEDURE — 97140 MANUAL THERAPY 1/> REGIONS: CPT

## 2024-12-11 PROCEDURE — 97110 THERAPEUTIC EXERCISES: CPT

## 2024-12-11 NOTE — FLOWSHEET NOTE
MODALITIES                         Other Therapeutic Activities/Education:     Home Exercise Program:      Access Code: [email or web address]  URL: [email or web address]  Date: 10/28/2024  Prepared by: Andrea Walton    Exercises  - Seated Scapular Retraction  - 2 x daily - 7 x weekly - 1 sets - 10 reps  - Seated Cervical Retraction  - 2 x daily - 7 x weekly - 1 sets - 10 reps  - Doorway Pec Stretch at 60 Elevation  - 2 x daily - 7 x weekly - 1 sets - 3 reps - 20 sec hold  - Seated Levator Scapulae Stretch  - 2 x daily - 7 x weekly - 1 sets - 3 reps -  20 sec hold    Manual Treatments:  Cervical distraction in neutral and with slight R side bend. SOR, STM/TPR to scalenes, UT's and Levators. Manual  lavelle UT and L levator stretching      Modalities:  none      Communication with other providers:  cert sent to Stew Wilson, DO 10/28/24, PN 11/25/24      Assessment:  [place] appeared to respond well to manual with less muscle tension, pain and N/T following.   Will continue to benefit from skilled PT services addressing nerve decompression and distal UE strengthening.   End session pain: 3/10      Plan for Next Session:  progress as tolerated, posture, manual , strength, and ROM        Time In / Time Out:      1122/1203 - arrived late         Timed Code/Total Treatment Minutes:     41/41, 2 man 1 TE       Next Progress Note due:  12/26/24      Plan of Care Interventions:  [x] Therapeutic Exercise  [x] Modalities: prn  [x] Therapeutic Activity     [] Ultrasound  [] Estim  [] Gait Training      [x] Cervical Traction [] Lumbar Traction  [x] Neuromuscular Re-education    [] Cold/hotpack [] Iontophoresis   [x] Instruction in HEP      [] Vasopneumatic   [] Dry Needling    [x] Manual Therapy               [] Aquatic Therapy              Electronically signed by:  Ragini Dubon PTA     12/11/2024, 11:22 AM

## 2024-12-16 ENCOUNTER — HOSPITAL ENCOUNTER (OUTPATIENT)
Dept: PHYSICAL THERAPY | Age: 49
Setting detail: THERAPIES SERIES
Discharge: HOME | End: 2024-12-16
Payer: COMMERCIAL

## 2024-12-16 PROCEDURE — 97140 MANUAL THERAPY 1/> REGIONS: CPT

## 2024-12-16 PROCEDURE — 97110 THERAPEUTIC EXERCISES: CPT

## 2024-12-16 NOTE — FLOWSHEET NOTE
Outpatient Physical Therapy  [place]           [x] Phone: [phone]   Fax: [phone]  [place]           [] Phone: [phone]   Fax: [phone]        Physical Therapy Daily Treatment Note  Date:  2024    Patient Name:  Emily He    :  1975  MRN: [id number]  Restrictions/Precautions: No data recorded      Diagnosis:   Neck pain [M54.2]    Date of Injury/Surgery:   Treatment Diagnosis:  cervical DDD and impringement L radiating symptoms. stiffness, hypomobility , posture  Insurance/Certification information: johann ( 30 PCY) - pre-cert after visit #8?  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 ind with HEP for postue and flexibility  met  2. reports 50% less L hand radiating symptoms  not met  3. cervical ROM Rot R and L  40 deg  met  4.  50% less TTP in the L lower cervical area and UT  not met  Long Term Goals  Time Frame for Long Term Goals: 6-8 weeks  1. report 75% better overall  in the neck and her L radiating symtoms with ADL and housework  not met  2. demo good cervical posture  met  3. L  improved to 50 #  met  4. cervical ROM Ext 45 flex 55 with no increaed neck pan or L radiating symptoms  not met  5. NDI improved to 11 pts or less   not met        Summary of Evaluation:  Assessment: Pt appears with DDD in the cervical area and osteophytes, she has EMG noting compression of C7-8 nerve root L. she reports that Dr Vaughn stated no surgery was indicated. she is having neck pain and L UE to the hand pain and tingling / weakness. She notes she is not able to tolerate her

## 2024-12-18 ENCOUNTER — HOSPITAL ENCOUNTER (OUTPATIENT)
Dept: PHYSICAL THERAPY | Age: 49
Setting detail: THERAPIES SERIES
Discharge: HOME | End: 2024-12-18
Payer: COMMERCIAL

## 2024-12-18 PROCEDURE — 97140 MANUAL THERAPY 1/> REGIONS: CPT

## 2024-12-18 PROCEDURE — 97110 THERAPEUTIC EXERCISES: CPT

## 2024-12-18 NOTE — PROGRESS NOTES
Outpatient Physical Therapy           [place]           [x] Phone: [phone]   Fax: [phone]  [place]           [] Phone: [phone]   Fax: [phone]      To: Stew Wilson, DO     From: Andrea Walton, PT, DPT     Patient: Emily He                    : 1975  Diagnosis:  Neck pain [M54.2]        Treatment Diagnosis:     cervical DDD and impringement L radiating symptoms. stiffness, hypomobility , posture   Date: 2024  [x]  Progress Note                []  Discharge Note    Evaluation Date:   10/28/24  Total Visits to date:   7 Cancels/No-shows to date:  4    Subjective:  She reports 25% better overall and with less constant tingling and numbness in the L hand. She would like to schedule visit for 1* per week starting in . And she will see the Dr again 25. She notes the neck and shoulder get more sore with driving , computer use. She states her pain is usually in the 5/10 range in the L cervical area and sporadically down the L UE to the hand       Plan of Care/Treatment to date:  [x] Therapeutic Exercise    [] Modalities:  [x] Therapeutic Activity     [] Ultrasound  [] Electrical Stimulation  [] Gait Training      [] Cervical Traction   [] Lumbar Traction  [x] Neuromuscular Re-education  [] Cold/hotpack [] Iontophoresis  [x] Instruction in HEP      Other:  [x] Manual Therapy       []  Vasopneumatic  [] Aquatic Therapy       []   Dry Needle Therapy                      Objective/Significant Findings At Last Visit/Comments:  cervical ROM flex 61 ext 42 Rot R 52 L 48 , NDI 21 pts,  25% less tingling in the L UE and hand with housework and ADLs, 25 % less TTP in the L UT an levator area,  L  : 60 #       Assessment:   Pt appears with continued L UT tightness and L UE radiating symptoms. She has made mild progress toward goals and benefit from continued therapy as we attempt to precert additional therapy visit coverage Will continue to benefit from skilled PT

## 2024-12-24 ENCOUNTER — HOSPITAL ENCOUNTER (OUTPATIENT)
Dept: PHYSICAL THERAPY | Age: 49
Discharge: HOME | End: 2024-12-24

## 2024-12-24 NOTE — PROGRESS NOTES
Physical Therapy  Cancellation/No-show Note  Patient Name:  Emily He  :  1975   Date:  2024  Cancelled visits to date: 4  No-shows to date:1    For today's appointment patient:  [x]  Cancelled  []  Rescheduled appointment  []  No-show     Reason given by patient:  []  Patient ill  []  Conflicting appointment  []  No transportation    []  Conflict with work  []  No reason given  [x]  Other:     Comments: Woke up late, won't make it    Electronically signed by:  Ani Frost PTA        2024,10:33 AM

## 2025-01-07 ENCOUNTER — HOSPITAL ENCOUNTER (OUTPATIENT)
Dept: PHYSICAL THERAPY | Age: 50
Setting detail: THERAPIES SERIES
Discharge: HOME | End: 2025-01-07
Payer: COMMERCIAL

## 2025-01-07 PROCEDURE — 97112 NEUROMUSCULAR REEDUCATION: CPT

## 2025-01-07 PROCEDURE — 97110 THERAPEUTIC EXERCISES: CPT

## 2025-01-07 NOTE — FLOWSHEET NOTE
Outpatient Physical Therapy  [place]           [x] Phone: [phone]   Fax: [phone]  [place]           [] Phone: [phone]   Fax: [phone]        Physical Therapy Daily Treatment Note  Date:  2025    Patient Name:  Emily He    :  1975  MRN: [id number]  Restrictions/Precautions: No data recorded      Diagnosis:   Neck pain [M54.2]    Date of Injury/Surgery:   Treatment Diagnosis:  cervical DDD and impringement L radiating symptoms. stiffness, hypomobility , posture  Insurance/Certification information: johann ( 30 PCY) - pre-cert after visit #8?  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 ind with HEP for postue and flexibility  met  2. reports 50% less L hand radiating symptoms  not met  3. cervical ROM Rot R and L  40 deg  met  4.  50% less TTP in the L lower cervical area and UT  not met  Long Term Goals  Time Frame for Long Term Goals: 6-8 weeks  1. report 75% better overall  in the neck and her L radiating symtoms with ADL and housework  not met  2. demo good cervical posture  met  3. L  improved to 50 #  met  4. cervical ROM Ext 45 flex 55 with no increaed neck pan or L radiating symptoms  not met  5. NDI improved to 11 pts or less   not met        Summary of Evaluation:  Assessment: Pt appears with DDD in the cervical area and osteophytes, she has EMG noting compression of C7-8 nerve root L. she

## 2025-01-16 ENCOUNTER — HOSPITAL ENCOUNTER (OUTPATIENT)
Dept: PHYSICAL THERAPY | Age: 50
Setting detail: THERAPIES SERIES
Discharge: HOME | End: 2025-01-16
Payer: COMMERCIAL

## 2025-01-16 PROCEDURE — 97110 THERAPEUTIC EXERCISES: CPT

## 2025-01-16 PROCEDURE — 97140 MANUAL THERAPY 1/> REGIONS: CPT

## 2025-01-16 NOTE — FLOWSHEET NOTE
with HEP,  25% less tingling in the R hand,  cervical Rot flex 41 ext  40 Rot  R 50 L 42 ,  25% less tender to palp in the L lower cervical area,  able to demo good posture int he clinic,  R 70# L 55# , NDI : 17 pts, able to demo good cervical posture in the clinc)   ( 11/18/24 radiating symptoms : mild to the L hand mainly in the L UT and cervical area , cerviacal ROM Rot R 56 L 48  )        \"Key-on-tai\"  Exercises: (No more than 4 columns)  neck pain , radicular symptoms L C7-8  Exercise/Equipment 6# 12/16/24 7# 12/18/24  8/15#  1/7/25  (Count renews in Jan.  Pre-cert after 8 visits) 9/15# 1/16/25            WARM UP          ube 4 min 4 min Increases pain           TABLE       L arm neural glides Ulnar and median nerve *3 min Ulnar and median nerve *3 min Ulnar/medial nerve glides x 3' Ulnar and median nerve *3 min   Chin tuck   X 15  Posture roll sitting                            STANDING       Chin tuck *15  *15 X 15   *15   Scap pinch    *20   UT stretch       Pec stretch *30 sec *30 sec     Rows GTB 2*10 Blue 2*10  Blue 2x10    Taffy pull  Gtb*10  GTB x 10 Gtb 2*10          Digi flex - yellow Single finger 20* ea  yellow Single finger 20* ea  yellow Single finger 20* ea  yellow Single finger 20* ea  yellow   Digi flex - yellow  Full hand  20*  yellow Full hand  20*  yellow Full hand  20*  yellow Full hand  20*  yellow   Wall T spine rotation *5 each *5 each X5 ea Not tolerated   Shoulder flex and scp with towel at neck on wall  2# 1*10 each 2# 1x10 ea  Not tolerated                                      MODALITIES                         Other Therapeutic Activities/Education:     Home Exercise Program:      Access Code: [email or web address]  URL: [email or web address]  Date: 10/28/2024  Prepared by: Andrea Walton    Exercises  - Seated Scapular Retraction  - 2 x daily - 7 x weekly - 1 sets - 10 reps  - Seated Cervical Retraction  - 2 x daily - 7 x weekly - 1 sets - 10 reps  - Doorway Pec

## 2025-01-16 NOTE — PROGRESS NOTES
Outpatient Physical Therapy           [place]           [] Phone: [phone]   Fax: [phone]  [place]           [] Phone: [phone]   Fax: [phone]      To: Stew Wilson, DO     From: Andrea Walton, PT, DPT     Patient: Emily He                    : 1975  Diagnosis:  Neck pain [M54.2]        Treatment Diagnosis:     cervical DDD and impringement L radiating symptoms. stiffness, hypomobility , posture   Date: 2025  [x]  Progress Note                []  Discharge Note    Evaluation Date:   10/28/25  Total Visits to date:   9 Cancels/No-shows to date:  5    Subjective:  pt states she is very sore and had difficulty with her pain driving here and whenever it is cold. She notes that she will see Dr Wilson again later this month and she is waiting for approval for the nerve ablation with Dr Krishnamurthy. She notes the pain is 7/10 in the neck and upper shoulder area and she is having numbness and tingling in the L arm. She reports only about 20% improved since starting therapy. She states she does not feel up to doing to much activity due to the pain.       Plan of Care/Treatment to date:  [x] Therapeutic Exercise    [] Modalities:  [x] Therapeutic Activity     [] Ultrasound  [] Electrical Stimulation  [] Gait Training      [] Cervical Traction   [] Lumbar Traction  [x] Neuromuscular Re-education  [] Cold/hotpack [] Iontophoresis  [x] Instruction in HEP      Other:  [x] Manual Therapy       []  Vasopneumatic  [] Aquatic Therapy       []   Dry Needle Therapy                      Objective/Significant Findings At Last Visit/Comments:  ind with HEP, 20%  less L hand radiating symptoms,  cervical ROM Rot R 63  L 46 flex 38 ext 66  20% less TTP in L UT and lower cervical area,  able to demo good cervical posture,   L 42 # ,  NDI : 26 pts       Assessment:   Little progress/continued pain, radicular symptoms and myofascial tightness. She does not appear to be tolerating therapy or making

## 2025-01-21 ENCOUNTER — HOSPITAL ENCOUNTER (OUTPATIENT)
Dept: PHYSICAL THERAPY | Age: 50
Setting detail: THERAPIES SERIES
Discharge: HOME | End: 2025-01-21
Payer: COMMERCIAL

## 2025-01-21 PROCEDURE — 97110 THERAPEUTIC EXERCISES: CPT

## 2025-01-21 PROCEDURE — 97140 MANUAL THERAPY 1/> REGIONS: CPT

## 2025-01-21 NOTE — FLOWSHEET NOTE
Outpatient Physical Therapy  [place]           [x] Phone: [phone]   Fax: [phone]  [place]           [] Phone: [phone]   Fax: [phone]        Physical Therapy Daily Treatment Note  Date:  2025    Patient Name:  Emily He    :  1975  MRN: [id number]  Restrictions/Precautions: No data recorded      Diagnosis:   Neck pain [M54.2]    Date of Injury/Surgery:   Treatment Diagnosis:  cervical DDD and impringement L radiating symptoms. stiffness, hypomobility , posture  Insurance/Certification information: johann ( 30 PCY) - pre-cert after visit #8?  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 ind with HEP for postue and flexibility  met  2. reports 50% less L hand radiating symptoms  not met  3. cervical ROM Rot R and L  40 deg  met  4.  50% less TTP in the L lower cervical area and UT  not met  Long Term Goals  Time Frame for Long Term Goals: 6-8 weeks  1. report 75% better overall  in the neck and her L radiating symtoms with ADL and housework  not met  2. demo good cervical posture  met  3. L  improved to 50 #  met  4. cervical ROM Ext 45 flex 55 with no increaed neck pan or L radiating symptoms  not met  5. NDI improved to 11 pts or less   not met        Summary of Evaluation:  Assessment: Pt appears with DDD in the cervical area and osteophytes, she has EMG noting

## 2025-01-23 ENCOUNTER — HOSPITAL ENCOUNTER (OUTPATIENT)
Dept: PHYSICAL THERAPY | Age: 50
Setting detail: THERAPIES SERIES
Discharge: HOME | End: 2025-01-23
Payer: COMMERCIAL

## 2025-01-23 PROCEDURE — 97140 MANUAL THERAPY 1/> REGIONS: CPT

## 2025-01-23 PROCEDURE — 97110 THERAPEUTIC EXERCISES: CPT

## 2025-01-23 NOTE — FLOWSHEET NOTE
manual)  (12/2/2024 able to achieve full ulnar and median nerve glide positions with min symptoms. Decreased numbness in fingertips with cervical distraction with the head in slight R lateral flexion.)  (11/25/24 ind with HEP,  25% less tingling in the R hand,  cervical Rot flex 41 ext  40 Rot  R 50 L 42 ,  25% less tender to palp in the L lower cervical area,  able to demo good posture int he clinic,  R 70# L 55# , NDI : 17 pts, able to demo good cervical posture in the clinc)   ( 11/18/24 radiating symptoms : mild to the L hand mainly in the L UT and cervical area , cerviacal ROM Rot R 56 L 48  )        \"Key-on-tai\"  Exercises: (No more than 4 columns)  neck pain , radicular symptoms L C7-8  Exercise/Equipment 8/15#  1/7/25  (Count renews in Jan.  Pre-cert after 8 visits) 9/15# 1/16/25 1/21/2025 #10 11# 1/23/25            WARM UP          ube Increases pain             TABLE       L arm neural glides Ulnar/medial nerve glides x 3' Ulnar and median nerve *3 min Ulnar and median nerve x 3 min Ulnar and median nerve x 3 min   Chin tuck X 15  Posture roll sitting  10 x 2 3\" with man assist 2*10 supine   Shoulder depression    10x w/  man cues  *10                    STANDING       Chin tuck X 15   *15     Scap pinch  *20 With row and     UT stretch       Pec stretch       Rows Blue 2x10  BTB 10x2 Gtb 2*10   Taffy pull GTB x 10 Gtb 2*10 GTB 10x2 Gtb 2*10          Digi flex - yellow Single finger 20* ea  yellow Single finger 20* ea  yellow Single finger 10 x 2 ea  yellow Single finger 10 x 2 ea  yellow   Digi flex - yellow  Full hand  20*  yellow Full hand  20*  yellow Full hand  10 x 2  yellow Full hand  10 x 2  yellow   Wall T spine rotation X5 ea Not tolerated 5 x2 ea side *5 each way   Shoulder flex and scp with towel at neck on wall 2# 1x10 ea  Not tolerated  pain                                      MODALITIES                         Other Therapeutic Activities/Education: Discussed

## 2025-01-28 ENCOUNTER — HOSPITAL ENCOUNTER (OUTPATIENT)
Dept: PHYSICAL THERAPY | Age: 50
Setting detail: THERAPIES SERIES
Discharge: HOME | End: 2025-01-28
Payer: COMMERCIAL

## 2025-01-28 PROCEDURE — 97140 MANUAL THERAPY 1/> REGIONS: CPT

## 2025-01-28 PROCEDURE — 97110 THERAPEUTIC EXERCISES: CPT

## 2025-01-28 NOTE — FLOWSHEET NOTE
Outpatient Physical Therapy  [place]           [x] Phone: [phone]   Fax: [phone]  [place]           [] Phone: [phone]   Fax: [phone]        Physical Therapy Daily Treatment Note  Date:  1/28/2025    Patient Name:  Emily He    DB:  1975  MRN: [id number]  Restrictions/Precautions: No data recorded      Diagnosis:   Neck pain [M54.2]    Date of Injury/Surgery:   Treatment Diagnosis:  cervical DDD and impringement L radiating symptoms. stiffness, hypomobility , posture  Insurance/Certification information: johann ( 30 PCY) - pre-cert after visit #8?  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 ind with HEP for postue and flexibility  met  2. reports 50% less L hand radiating symptoms  not met  3. cervical ROM Rot R and L  40 deg  met  4.  50% less TTP in the L lower cervical area and UT  not met  Long Term Goals  Time Frame for Long Term Goals: 6-8 weeks  1. report 75% better overall  in the neck and her L radiating symtoms with ADL and housework  not met  2. demo good cervical posture  met  3. L  improved to 50 #  met  4. cervical ROM Ext 45 flex 55 with no increaed neck pan or L radiating symptoms  not met  5. NDI improved to 11 pts or less   not met        Summary of Evaluation:  Assessment: Pt appears with DDD in the cervical area and osteophytes, she has EMG noting

## 2025-01-31 ENCOUNTER — OFFICE VISIT (OUTPATIENT)
Age: 50
End: 2025-01-31
Payer: COMMERCIAL

## 2025-01-31 VITALS
OXYGEN SATURATION: 99 % | DIASTOLIC BLOOD PRESSURE: 68 MMHG | SYSTOLIC BLOOD PRESSURE: 108 MMHG | HEART RATE: 70 BPM | WEIGHT: 171.6 LBS | BODY MASS INDEX: 26.09 KG/M2

## 2025-01-31 DIAGNOSIS — G43.009 MIGRAINE WITHOUT AURA, NOT INTRACTABLE, WITHOUT STATUS MIGRAINOSUS: ICD-10-CM

## 2025-01-31 DIAGNOSIS — M79.2 NEUROPATHIC PAIN: ICD-10-CM

## 2025-01-31 PROCEDURE — 99213 OFFICE O/P EST LOW 20 MIN: CPT | Performed by: NURSE PRACTITIONER

## 2025-01-31 PROCEDURE — G8417 CALC BMI ABV UP PARAM F/U: HCPCS | Performed by: NURSE PRACTITIONER

## 2025-01-31 PROCEDURE — 4004F PT TOBACCO SCREEN RCVD TLK: CPT | Performed by: NURSE PRACTITIONER

## 2025-01-31 PROCEDURE — G8427 DOCREV CUR MEDS BY ELIG CLIN: HCPCS | Performed by: NURSE PRACTITIONER

## 2025-01-31 PROCEDURE — 99212 OFFICE O/P EST SF 10 MIN: CPT | Performed by: NURSE PRACTITIONER

## 2025-01-31 RX ORDER — ZONISAMIDE 100 MG/1
200 CAPSULE ORAL DAILY
Qty: 60 CAPSULE | Refills: 5 | Status: SHIPPED | OUTPATIENT
Start: 2025-01-31 | End: 2025-01-31 | Stop reason: SDUPTHER

## 2025-01-31 RX ORDER — OXCARBAZEPINE 300 MG/1
600 TABLET, FILM COATED ORAL 2 TIMES DAILY
Qty: 120 TABLET | Refills: 5 | Status: SHIPPED | OUTPATIENT
Start: 2025-01-31 | End: 2025-07-30

## 2025-01-31 RX ORDER — DULOXETIN HYDROCHLORIDE 60 MG/1
60 CAPSULE, DELAYED RELEASE ORAL NIGHTLY
Qty: 90 CAPSULE | Refills: 3 | Status: SHIPPED | OUTPATIENT
Start: 2025-01-31

## 2025-01-31 RX ORDER — GABAPENTIN 600 MG/1
600 TABLET ORAL 3 TIMES DAILY
Qty: 60 TABLET | Refills: 5 | Status: SHIPPED | OUTPATIENT
Start: 2025-01-31 | End: 2026-01-30

## 2025-01-31 RX ORDER — DULOXETIN HYDROCHLORIDE 30 MG/1
30 CAPSULE, DELAYED RELEASE ORAL DAILY
Qty: 90 CAPSULE | Refills: 3 | Status: SHIPPED | OUTPATIENT
Start: 2025-01-31

## 2025-01-31 RX ORDER — DULOXETIN HYDROCHLORIDE 60 MG/1
60 CAPSULE, DELAYED RELEASE ORAL NIGHTLY
Qty: 30 CAPSULE | Refills: 5 | Status: SHIPPED | OUTPATIENT
Start: 2025-01-31 | End: 2025-01-31

## 2025-01-31 RX ORDER — ZONISAMIDE 100 MG/1
200 CAPSULE ORAL DAILY
Qty: 180 CAPSULE | Refills: 3 | Status: SHIPPED | OUTPATIENT
Start: 2025-01-31 | End: 2026-01-31

## 2025-01-31 RX ORDER — DULOXETIN HYDROCHLORIDE 30 MG/1
30 CAPSULE, DELAYED RELEASE ORAL DAILY
Qty: 30 CAPSULE | Refills: 5 | Status: SHIPPED | OUTPATIENT
Start: 2025-01-31 | End: 2025-01-31

## 2025-01-31 NOTE — PROGRESS NOTES
1/31/25    Emily He  1975    Chief Complaint   Patient presents with    Follow-up     Pt here for follow up of migraines and neck pain. Numbness and tingling in left arm.        History of Present Illness  Emily is a 49 y.o. female presenting today for follow-up of: Cervical radiculopathy.    She is a former patient of Dr. Orantes.  Transitioned to Dr. Wilson on 7/9/2024 for cervical radiculopathy and peripheral neuropathy.  She has nerve pain coming down from the left side of her neck into her shoulder and under arm and forearm.  Yesterday she had an ablation for her cervical radiculopathy with Dr. Krishnamurthy.  So far it has been helpful.  She is on gabapentin 800 mg in the morning, 800 mg in the afternoon, 600 mg in the evening for neuropathic pain but wants to decrease her gabapentin to 600 mg twice daily.  She is on oxcarbazepine 600 mg twice daily for neuropathic pain she is on zonisamide 200 mg at bedtime for migraine prophylaxis.  She was on baclofen 40 mg twice daily for muscle spasms but weaned herself off of this.  She is on duloxetine 30 mg twice daily for her mood disorder but would like to increase her dose back to an additional 60 mg in the evening to help with her mood.  She follows with Dr. Vaughn-no intervention is needed at this time regarding her degenerative disc disease.  Last visit, she was referred to physical therapy for her neck pain.  She did participate in physical therapy.    Her EMG of the upper extremities showed Chronic C7-C8 cervical radiculopathy identified bilaterally as evidenced by enlargement of motor units in multiple proximal and distal C7/C8 innervated muscles of the bilateral upper extremities.  Time course is chronic as evidenced by motor unit reinnervation.  There is not convincing evidence of ongoing axon loss.  MRI of the cervical spine was ordered to further evaluate findings of EMG and was not completed because she has a final stimulator.  She did complete

## 2025-03-19 ENCOUNTER — TELEPHONE (OUTPATIENT)
Age: 50
End: 2025-03-19

## 2025-07-08 ENCOUNTER — RESULTS FOLLOW-UP (OUTPATIENT)
Age: 50
End: 2025-07-08